# Patient Record
Sex: FEMALE | Race: WHITE | NOT HISPANIC OR LATINO | Employment: FULL TIME | URBAN - METROPOLITAN AREA
[De-identification: names, ages, dates, MRNs, and addresses within clinical notes are randomized per-mention and may not be internally consistent; named-entity substitution may affect disease eponyms.]

---

## 2017-01-03 ENCOUNTER — TELEPHONE (OUTPATIENT)
Dept: BARIATRICS | Facility: CLINIC | Age: 47
End: 2017-01-03

## 2017-01-03 NOTE — TELEPHONE ENCOUNTER
Received a call from Gina with [employer]. She states she tried calling last Friday and was unable to reach anyone and sent a fax stating this patient is denied as she has no benefits for bariatric surgery.  They have received the letters from multiple doctors and she states that the plan she chose through her employer doesn't have bariatric benefits. The medical director and she at [employer] have reviewed her plan and regardless of medical necessity which her doctors are proposing her plan has an exclusion for bariatric surgery.  The fax she sent includes instructions for an appeal which would have to be done in writing because this case has been brought to the medical director already.  She states [employer] doesn't have bariatric benefits depending of the plan chosen. She states that if she goes to her employer and they're stating that it does then she will have to find out from them how to turn the decision around.  I've had Gina send me the fax to my office which was done and I've given this info to [facility] so she can notify patient. Gina said the telephone number given, [phone] is her direct number and she can be called if we have any further questions.

## 2017-01-04 DIAGNOSIS — M54.31 BILATERAL SCIATICA: ICD-10-CM

## 2017-01-04 DIAGNOSIS — M54.32 BILATERAL SCIATICA: ICD-10-CM

## 2017-01-04 DIAGNOSIS — M54.17 LUMBOSACRAL RADICULOPATHY: ICD-10-CM

## 2017-01-04 RX ORDER — GABAPENTIN 300 MG/1
900 CAPSULE ORAL 3 TIMES DAILY
Qty: 270 CAPSULE | Refills: 2 | Status: SHIPPED | OUTPATIENT
Start: 2017-01-04 | End: 2017-02-03

## 2017-01-26 ENCOUNTER — OFFICE VISIT (OUTPATIENT)
Dept: BARIATRICS | Facility: CLINIC | Age: 47
End: 2017-01-26
Payer: COMMERCIAL

## 2017-01-26 VITALS
DIASTOLIC BLOOD PRESSURE: 78 MMHG | WEIGHT: 293 LBS | HEIGHT: 69 IN | BODY MASS INDEX: 43.4 KG/M2 | SYSTOLIC BLOOD PRESSURE: 140 MMHG | HEART RATE: 78 BPM

## 2017-01-26 DIAGNOSIS — M54.10 RADICULAR PAIN OF LEFT LOWER EXTREMITY: ICD-10-CM

## 2017-01-26 DIAGNOSIS — R73.03 PREDIABETES: Primary | ICD-10-CM

## 2017-01-26 DIAGNOSIS — M48.061 LUMBAR SPINAL STENOSIS: ICD-10-CM

## 2017-01-26 PROCEDURE — 3078F DIAST BP <80 MM HG: CPT | Mod: S$GLB,,, | Performed by: INTERNAL MEDICINE

## 2017-01-26 PROCEDURE — 99999 PR PBB SHADOW E&M-EST. PATIENT-LVL III: CPT | Mod: PBBFAC,,, | Performed by: INTERNAL MEDICINE

## 2017-01-26 PROCEDURE — 1159F MED LIST DOCD IN RCRD: CPT | Mod: S$GLB,,, | Performed by: INTERNAL MEDICINE

## 2017-01-26 PROCEDURE — 99213 OFFICE O/P EST LOW 20 MIN: CPT | Mod: S$GLB,,, | Performed by: INTERNAL MEDICINE

## 2017-01-26 PROCEDURE — 3077F SYST BP >= 140 MM HG: CPT | Mod: S$GLB,,, | Performed by: INTERNAL MEDICINE

## 2017-01-26 RX ORDER — METFORMIN HYDROCHLORIDE 1000 MG/1
1000 TABLET, FILM COATED, EXTENDED RELEASE ORAL
Qty: 90 TABLET | Refills: 1 | Status: SHIPPED | OUTPATIENT
Start: 2017-01-26 | End: 2017-02-07

## 2017-01-26 NOTE — PATIENT INSTRUCTIONS
3 meals a day made up of :  Unlimited green vegetables, tomatoes, mushrooms, spaghetti squash, cauliflower, meat, poultry, seafood, eggs and hard cheeses.   Avoid fried foods  Dressings, seasonings, condiments, etc should have less than 2 g sugars.    beans or nuts can have 1 x a day.   1-2 servings of citrus fruits, berries, pineapple or melon a day (1/2 cup)  Milk and plain yogurt    [email or web address]- moderate carb intake    Return if symptoms worsen or fail to improve.

## 2017-01-26 NOTE — MR AVS SNAPSHOT
[facility] - Bariatric Surgery  [address]  [facility] [address]  Phone: [phone]  Fax: [phone]                  Kalee Dupree   2017 2:00 PM   Office Visit    Description:  Female : 1970   Provider:  Debbie Trevino MD   Department:  [facility] - Bariatric Surgery           Reason for Visit     Follow-up           Diagnoses this Visit        Comments    Prediabetes    -  Primary            To Do List           Future Appointments        Provider Department Dept Phone    2/15/2017 4:00 PM Collin Tuttle MD [facility] [place] [facility] [phone]      Goals (5 Years of Data)     None      Follow-Up and Disposition     Return if symptoms worsen or fail to improve.       These Medications        Disp Refills Start End    metformin (GLUMETZA) 1000 MG (MOD) 24 hr tablet 90 tablet 1 2017    Take 1 tablet (1,000 mg total) by mouth daily with breakfast. - Oral    Pharmacy: [facility]/pharmacy #[id number] - [place] - [address]  #: [phone]         [facility]     [facility] -  Assistance  Registered nurses in the [facility] provide clinical advisement, health education, appointment booking, and other advisory services.  Call for this free service at [phone].             Medications           Message regarding Medications     Verify the changes and/or additions to your medication regime listed below are the same as discussed with your clinician today.  If any of these changes or additions are incorrect, please notify your healthcare provider.        START taking these NEW medications        Refills    metformin (GLUMETZA) 1000 MG (MOD) 24 hr tablet 1    Sig: Take 1 tablet (1,000 mg total) by mouth daily with breakfast.    Class: Normal    Route: Oral           Verify that the below list of medications is an accurate representation of the medications you are currently taking.  If none reported, the list may be blank. If  "incorrect, please contact your healthcare provider. Carry this list with you in case of emergency.           Current Medications     amitriptyline (ELAVIL) 10 MG tablet Take 1 tablet (10 mg total) by mouth every evening.    citalopram (CELEXA) 20 MG tablet Take 1 tablet (20 mg total) by mouth once daily.    fluticasone (FLONASE) 50 mcg/actuation nasal spray 1 spray by Each Nare route once daily.    gabapentin (NEURONTIN) 300 MG capsule Take 3 capsules (900 mg total) by mouth 3 (three) times daily.    hydrochlorothiazide (HYDRODIURIL) 25 MG tablet Take 1 tablet (25 mg total) by mouth once daily.    lisinopril (PRINIVIL,ZESTRIL) 40 MG tablet TAKE 1 TABLET BY MOUTH EVERY DAY    lisinopril (PRINIVIL,ZESTRIL) 40 MG tablet Take 1 tablet (40 mg total) by mouth once daily.    metformin (GLUMETZA) 1000 MG (MOD) 24 hr tablet Take 1 tablet (1,000 mg total) by mouth daily with breakfast.    metoprolol succinate (TOPROL-XL) 50 MG 24 hr tablet Take 1 tablet (50 mg total) by mouth once daily.    phentermine (ADIPEX-P) 37.5 mg tablet Take 1 tablet (37.5 mg total) by mouth before breakfast.    PROAIR HFA 90 mcg/actuation inhaler INHALE 1-2 PUFFS INTO THE LUNGS EVERY 6 (SIX) HOURS AS NEEDED FOR WHEEZING OR SHORTNESS OF BREATH.    tolterodine (DETROL LA) 4 MG 24 hr capsule TAKE 1 CAPSULE (4 MG TOTAL) BY MOUTH ONCE DAILY.    topiramate (TOPAMAX) 100 MG tablet Take 1 tablet (100 mg total) by mouth 2 (two) times daily.           Clinical Reference Information           Vital Signs - Last Recorded  Most recent update: 1/26/2017  2:17 PM by Taylor Elena MA    BP Pulse Ht Wt BMI    (!) 140/78 78 5' 9" (1.753 m) (!) 200.4 kg (441 lb 12.8 oz) 65.24 kg/m2      Blood Pressure          Most Recent Value    BP  (!)  140/78      Allergies as of 1/26/2017     Amlodipine    Azithromycin    Benazepril    Hydrocodone    Lotrel  [Amlodipine-benazepril]    Vicodin  [Hydrocodone-acetaminophen]      Immunizations Administered on Date of Encounter - " 1/26/2017     None      Instructions    3 meals a day made up of :  Unlimited green vegetables, tomatoes, mushrooms, spaghetti squash, cauliflower, meat, poultry, seafood, eggs and hard cheeses.   Avoid fried foods  Dressings, seasonings, condiments, etc should have less than 2 g sugars.    beans or nuts can have 1 x a day.   1-2 servings of citrus fruits, berries, pineapple or melon a day (1/2 cup)  Milk and plain yogurt    [email or web address]- moderate carb intake    Return if symptoms worsen or fail to improve.

## 2017-01-27 PROBLEM — M79.605 LUMBAR PAIN WITH RADIATION DOWN LEFT LEG: Status: ACTIVE | Noted: 2017-01-27

## 2017-01-27 PROBLEM — M54.50 LUMBAR PAIN WITH RADIATION DOWN LEFT LEG: Status: ACTIVE | Noted: 2017-01-27

## 2017-01-27 PROBLEM — M54.16 LUMBAR BACK PAIN WITH RADICULOPATHY AFFECTING RIGHT LOWER EXTREMITY: Status: ACTIVE | Noted: 2017-01-27

## 2017-01-27 NOTE — PROGRESS NOTES
Subjective:       Patient ID: Kalee Dupree is a 46 y.o. female.    Chief Complaint: Follow-up    HPI Comments: Pt is here today for follow-up of prediabetes, radicular pain associated with Lumbar spinal stenosis.  SHe has been seen by myself and neurology, and we agree that her problems were related to her weight. She did lose about 100 lbs in the past, but gained it back when dealing with some family stressors. She has lost about 10 lbs since I Last saw her. Does crave Carbs. Last A1c 6.4. Family h/o diabetes, including her mom who is in stage 4 CKD.  Has with her today documentation from [employer] that they will not cover gastric bypass.     Review of Systems   Constitutional: Negative for activity change, appetite change and unexpected weight change.   HENT: Negative for sore throat and voice change.    Eyes: Negative for pain and visual disturbance.   Respiratory: Negative for shortness of breath and wheezing.    Cardiovascular: Negative for palpitations and leg swelling.   Genitourinary: Positive for urgency. Negative for difficulty urinating and dysuria.   Musculoskeletal: Negative for arthralgias, back pain and myalgias.   Skin: Negative for pallor and rash.   Neurological: Positive for weakness and numbness. Negative for syncope, light-headedness and headaches.   Psychiatric/Behavioral: Positive for dysphoric mood. Negative for sleep disturbance. The patient is not nervous/anxious.        Objective:      Physical Exam    Assessment:       1. Prediabetes    2. Radicular pain of left lower extremity    3. Lumbar spinal stenosis        Plan:         1. Prediabetes  Start metforminn . Continue phentermine rxed by PCP.   - metformin (GLUMETZA) 1000 MG (MOD) 24 hr tablet; Take 1 tablet (1,000 mg total) by mouth daily with breakfast.  Dispense: 90 tablet; Refill: 1    2. Radicular pain of left lower extremity  Limiting her activity. Pain is better controled with gabapentin, topiramate and use of a cane.     3.  Lumbar spinal stenosis  See above. She will attempt for external review of insurance denial. If they still will not cover it, she will apply for a radha. Unfortunely, she has been facing a great deal of morbidity with this, and there is little any interventions will do at her current weight.     3 meals a day made up of :  Unlimited green vegetables, tomatoes, mushrooms, spaghetti squash, cauliflower, meat, poultry, seafood, eggs and hard cheeses.   Avoid fried foods  Dressings, seasonings, condiments, etc should have less than 2 g sugars.    beans or nuts can have 1 x a day.   1-2 servings of citrus fruits, berries, pineapple or melon a day (1/2 cup)  Milk and plain yogurt    [email or web address]- moderate carb intake    Return if symptoms worsen or fail to improve

## 2017-02-06 ENCOUNTER — TELEPHONE (OUTPATIENT)
Dept: BARIATRICS | Facility: CLINIC | Age: 47
End: 2017-02-06

## 2017-02-06 DIAGNOSIS — R73.03 PREDIABETES: Primary | ICD-10-CM

## 2017-02-06 NOTE — TELEPHONE ENCOUNTER
[facility] called stating the patient's insurance is not covering her [id number] 24HR. Pharmacy is requesting we changed it to Metformin 500 Extended release. The insurance will cover that one.

## 2017-02-07 ENCOUNTER — TELEPHONE (OUTPATIENT)
Dept: BARIATRICS | Facility: CLINIC | Age: 47
End: 2017-02-07

## 2017-02-07 RX ORDER — METFORMIN HYDROCHLORIDE EXTENDED-RELEASE TABLETS 500 MG/1
1000 TABLET, FILM COATED, EXTENDED RELEASE ORAL
Qty: 180 TABLET | Refills: 1 | Status: SHIPPED | OUTPATIENT
Start: 2017-02-07 | End: 2017-08-29 | Stop reason: SDUPTHER

## 2017-02-07 NOTE — TELEPHONE ENCOUNTER
----- Message from Adonay Lindsay sent at 2/7/2017 10:41 AM CST -----  Contact: [facility]  Pharmacy said the prescriptions needs to be changed to regular Metformin ER. Please call regarding this at [phone]

## 2017-02-15 ENCOUNTER — OFFICE VISIT (OUTPATIENT)
Dept: NEUROLOGY | Facility: CLINIC | Age: 47
End: 2017-02-15
Payer: COMMERCIAL

## 2017-02-15 VITALS
BODY MASS INDEX: 43.4 KG/M2 | SYSTOLIC BLOOD PRESSURE: 138 MMHG | WEIGHT: 293 LBS | HEIGHT: 69 IN | HEART RATE: 76 BPM | DIASTOLIC BLOOD PRESSURE: 82 MMHG

## 2017-02-15 DIAGNOSIS — R32 URINARY INCONTINENCE IN FEMALE: Primary | ICD-10-CM

## 2017-02-15 PROCEDURE — 3075F SYST BP GE 130 - 139MM HG: CPT | Mod: S$GLB,,, | Performed by: NEUROLOGICAL SURGERY

## 2017-02-15 PROCEDURE — 99214 OFFICE O/P EST MOD 30 MIN: CPT | Mod: S$GLB,,, | Performed by: NEUROLOGICAL SURGERY

## 2017-02-15 PROCEDURE — 3079F DIAST BP 80-89 MM HG: CPT | Mod: S$GLB,,, | Performed by: NEUROLOGICAL SURGERY

## 2017-02-15 PROCEDURE — 99999 PR PBB SHADOW E&M-EST. PATIENT-LVL III: CPT | Mod: PBBFAC,,, | Performed by: NEUROLOGICAL SURGERY

## 2017-02-15 RX ORDER — METFORMIN HYDROCHLORIDE 500 MG/1
TABLET, EXTENDED RELEASE ORAL
Refills: 1 | COMMUNITY
Start: 2017-02-06 | End: 2017-02-15 | Stop reason: SDUPTHER

## 2017-02-15 NOTE — PROGRESS NOTES
Chief Complaint   Patient presents with    3 Month Follow Up Lumbosacral Radiculopathy        Kalee Dupree is a 46 y.o. female with a history of multiple medical diagnoses as listed below that presents for evaluation of back pain. She has been having pain in the lower back with radiation into the buttocks, posterior thigh, and lateral leg for years but has only felt that it has been getting worse. She says that the pain is worse on the left side but has also been going on the right side as well. She has has MRI that showed that she has had degenerative changes in the lumbar spine and compression of the exiting nerve roots as well. She says that the pain has been make working difficult and she has been finding it harder to find ways to make it through her day. She has been evaluated in the past by spine surgery who recommended that she have decompression and fusion to treat her symtpoms however they required that she lose weight prior to being able to undertake the procedure. She has not been able to get a gastric sleeve due to coverage from her insurance. She has been having urinary incontinence when she changes position especially when she awakes in the morning. She has had weakness in the legs as well that has been making walking and standing increasingly difficult as well. She has not had any numbness in the medial thigh nor in the genitals.    Interval History  02/15/2017  She has been having persistent urinary incontinence since she was last seen. She says that despite her best efforts to hold her urin until she makes it to the bathroom she has been able to do so. She says that besides the embarrassment she has had several occasion where she was unable to hold her bladder on their carpet at home so that she has needed to replace it. She has been unable to walk very well and continues to have weakness in the legs causing her to fall on several occasions especially when she tries to get up quickly at night.  She has had persistent pain in the lower back. Opiate pain medications and NSAIDs have been unable to relieve her pains. She is now using a acne almost all the time due to her frequent falls. She has not had any incontinence of the bowels since she was last seen in clinic.    PAST MEDICAL HISTORY:  Past Medical History   Diagnosis Date    Focal segmental glomerulosclerosis     Hyperlipidemia     Hypertension     Morbid obesity     Proteinuria     Retinal artery occlusion        PAST SURGICAL HISTORY:  Past Surgical History   Procedure Laterality Date     section      Cholecystectomy       section         SOCIAL HISTORY:  Social History     Social History    Marital status:      Spouse name: N/A    Number of children: N/A    Years of education: N/A     Occupational History     [employer]     Social History Main Topics    Smoking status: Never Smoker    Smokeless tobacco: Not on file    Alcohol use No    Drug use: No    Sexual activity: Yes     Partners: Male     Other Topics Concern    Not on file     Social History Narrative    Several family members on mother side with aneurysms heart and aorta       FAMILY HISTORY:  Family History   Problem Relation Age of Onset    Cancer Mother 56     colon    Diabetes Mother     Hyperlipidemia Mother     Hypertension Mother     Obesity Mother     Kidney disease Mother     Cancer Father      lung       ALLERGIES AND MEDICATIONS: updated and reviewed.  Review of patient's allergies indicates:   Allergen Reactions    Amlodipine      Other reaction(s): Unknown    Azithromycin      Stomach pain, and cramping    Benazepril      Other reaction(s): Unknown    Hydrocodone      Other reaction(s): Unknown    Lotrel  [amlodipine-benazepril] Itching    Vicodin  [hydrocodone-acetaminophen] Hives and Itching     Current Outpatient Prescriptions   Medication Sig Dispense Refill    amitriptyline (ELAVIL) 10 MG tablet Take 1  tablet (10 mg total) by mouth every evening. 30 tablet 11    citalopram (CELEXA) 20 MG tablet Take 1 tablet (20 mg total) by mouth once daily. 90 tablet 1    hydrochlorothiazide (HYDRODIURIL) 25 MG tablet Take 1 tablet (25 mg total) by mouth once daily. 90 tablet 1    lisinopril (PRINIVIL,ZESTRIL) 40 MG tablet TAKE 1 TABLET BY MOUTH EVERY DAY 30 tablet 0    lisinopril (PRINIVIL,ZESTRIL) 40 MG tablet Take 1 tablet (40 mg total) by mouth once daily. 90 tablet 1    metformin (FORTAMET) 500 mg 24 hr tablet Take 2 tablets (1,000 mg total) by mouth daily with breakfast. 180 tablet 1    metoprolol succinate (TOPROL-XL) 50 MG 24 hr tablet Take 1 tablet (50 mg total) by mouth once daily. 90 tablet 3    phentermine (ADIPEX-P) 37.5 mg tablet Take 1 tablet (37.5 mg total) by mouth before breakfast. 30 tablet 2    PROAIR HFA 90 mcg/actuation inhaler INHALE 1-2 PUFFS INTO THE LUNGS EVERY 6 (SIX) HOURS AS NEEDED FOR WHEEZING OR SHORTNESS OF BREATH. 8.5 g 0    tolterodine (DETROL LA) 4 MG 24 hr capsule TAKE 1 CAPSULE (4 MG TOTAL) BY MOUTH ONCE DAILY. 30 capsule 2    topiramate (TOPAMAX) 100 MG tablet Take 1 tablet (100 mg total) by mouth 2 (two) times daily. 180 tablet 1    fluticasone (FLONASE) 50 mcg/actuation nasal spray 1 spray by Each Nare route once daily. 16 g 1    gabapentin (NEURONTIN) 300 MG capsule Take 3 capsules (900 mg total) by mouth 3 (three) times daily. 270 capsule 2     No current facility-administered medications for this visit.        Review of Systems   Constitutional: Negative for activity change, appetite change, fever and unexpected weight change.   HENT: Negative for trouble swallowing and voice change.    Eyes: Negative for photophobia and visual disturbance.   Respiratory: Negative for apnea and shortness of breath.    Cardiovascular: Negative for chest pain and leg swelling.   Gastrointestinal: Negative for constipation and nausea.   Genitourinary: Negative for difficulty urinating.    Musculoskeletal: Positive for gait problem. Negative for back pain and neck pain.   Skin: Negative for color change and pallor.   Neurological: Positive for weakness and numbness. Negative for dizziness, seizures and syncope.   Hematological: Negative for adenopathy.   Psychiatric/Behavioral: Positive for dysphoric mood. Negative for agitation, confusion and decreased concentration. The patient is nervous/anxious.        Neurologic Exam     Mental Status   Oriented to person, place, and time.   Registration: recalls 3 of 3 objects.   Attention: normal. Concentration: normal.   Speech: speech is normal   Level of consciousness: alert  Knowledge: good.     Cranial Nerves     CN II   Visual fields full to confrontation.   Right visual field deficit: none  Left visual field deficit: none     CN III, IV, VI   Pupils are equal, round, and reactive to light.  Extraocular motions are normal.   Right pupil: Size: 3 mm. Shape: regular. Accommodation: intact.   Left pupil: Size: 3 mm. Shape: regular. Accommodation: intact.   CN III: no CN III palsy  CN VI: no CN VI palsy  Nystagmus: none   Diplopia: none  Ophthalmoparesis: none  Upgaze: normal  Downgaze: normal  Conjugate gaze: present    CN V   Facial sensation intact.   Right facial sensation deficit: none  Left facial sensation deficit: none    CN VII   Facial expression full, symmetric.   Right facial weakness: none  Left facial weakness: none    CN VIII   CN VIII normal.     CN IX, X   CN IX normal.   CN X normal.   Palate: symmetric    CN XI   CN XI normal.   Right sternocleidomastoid strength: normal  Left sternocleidomastoid strength: normal  Right trapezius strength: normal  Left trapezius strength: normal    CN XII   CN XII normal.   Tongue deviation: none    Motor Exam   Muscle bulk: normal  Overall muscle tone: normal  Right arm tone: normal  Left arm tone: normal  Right leg tone: normal  Left leg tone: normal    Strength   Strength 5/5 throughout.     Sensory  Exam   Right arm light touch: normal  Left arm light touch: normal  Right leg light touch: decreased from knee  Left leg light touch: decreased from knee  Right arm vibration: normal  Left arm vibration: normal  Right leg vibration: decreased from knee  Left leg vibration: decreased from knee  Right arm proprioception: normal  Left arm proprioception: normal  Right leg proprioception: decreased from knee  Left leg proprioception: decreased from knee  Right arm pinprick: normal  Left arm pinprick: normal  Right leg pinprick: decreased from knee  Left leg pinprick: decreased from knee  Sensory deficit distribution on right: sciatic  Sensory deficit distribution on left: sciatic    Gait, Coordination, and Reflexes     Gait  Gait: normal    Coordination   Romberg: negative  Finger to nose coordination: normal  Heel to shin coordination: normal  Tandem walking coordination: normal    Tremor   Resting tremor: absent    Reflexes   Right brachioradialis: 2+  Left brachioradialis: 2+  Right biceps: 2+  Left biceps: 2+  Right triceps: 2+  Left triceps: 2+  Right patellar: 1+  Left patellar: 1+  Right achilles: 1+  Left achilles: 1+  Right plantar: normal  Left plantar: normal      Physical Exam   Constitutional: She is oriented to person, place, and time.   Eyes: EOM are normal. Pupils are equal, round, and reactive to light.   Neurological: She is oriented to person, place, and time. She has normal strength. She has a normal Finger-Nose-Finger Test, a normal Heel to Shin Test, a normal Romberg Test and a normal Tandem Gait Test. Gait normal.   Reflex Scores:       Tricep reflexes are 2+ on the right side and 2+ on the left side.       Bicep reflexes are 2+ on the right side and 2+ on the left side.       Brachioradialis reflexes are 2+ on the right side and 2+ on the left side.       Patellar reflexes are 1+ on the right side and 1+ on the left side.       Achilles reflexes are 1+ on the right side and 1+ on the left  "side.  Psychiatric: Her speech is normal.       Vitals:    02/15/17 1611   BP: 138/82   BP Location: Left arm   Patient Position: Sitting   BP Method: Manual   Pulse: 76   Weight: (!) 200 kg (440 lb 14.7 oz)   Height: 5' 9" (1.753 m)     MRI lumbar spine personally reviewed: degenerative changes in L4-S1 discs with compression of the neuroforaminal spaces and compression of epidural space    Assessment & Plan:  Urinary incontinence in female  -     Ambulatory Referral to Urology         Patient's symptoms will likely continue get worse with time unless she has surgery to correct the lumbar spine problems  Gabapentin to help with neuropathic pains  Elavil to help with bladder, sleep, and pain issues  Referral to urology due to persistent incontinence    Follow-up: Return in about 3 months (around 5/15/2017).  More than 50% of this 25 minute encounter was spent in counseling and coordinating care.      "

## 2017-03-20 ENCOUNTER — PATIENT MESSAGE (OUTPATIENT)
Dept: NEUROLOGY | Facility: CLINIC | Age: 47
End: 2017-03-20

## 2017-04-18 ENCOUNTER — OFFICE VISIT (OUTPATIENT)
Dept: UROLOGY | Facility: CLINIC | Age: 47
End: 2017-04-18
Payer: COMMERCIAL

## 2017-04-18 VITALS
DIASTOLIC BLOOD PRESSURE: 92 MMHG | HEIGHT: 69 IN | BODY MASS INDEX: 43.4 KG/M2 | SYSTOLIC BLOOD PRESSURE: 143 MMHG | WEIGHT: 293 LBS | HEART RATE: 92 BPM

## 2017-04-18 DIAGNOSIS — N39.46 MIXED STRESS AND URGE URINARY INCONTINENCE: Primary | ICD-10-CM

## 2017-04-18 DIAGNOSIS — I10 ESSENTIAL HYPERTENSION: ICD-10-CM

## 2017-04-18 DIAGNOSIS — R73.03 PREDIABETES: ICD-10-CM

## 2017-04-18 DIAGNOSIS — E66.01 MORBID OBESITY, UNSPECIFIED OBESITY TYPE: ICD-10-CM

## 2017-04-18 LAB
BILIRUB SERPL-MCNC: NORMAL MG/DL
BLOOD URINE, POC: 250
COLOR, POC UA: NORMAL
GLUCOSE UR QL STRIP: NORMAL
KETONES UR QL STRIP: NORMAL
LEUKOCYTE ESTERASE URINE, POC: NORMAL
NITRITE, POC UA: NORMAL
PH, POC UA: 5
PROTEIN, POC: 2
SPECIFIC GRAVITY, POC UA: 1.01
UROBILINOGEN, POC UA: NORMAL

## 2017-04-18 PROCEDURE — 51701 INSERT BLADDER CATHETER: CPT | Mod: S$GLB,,, | Performed by: UROLOGY

## 2017-04-18 PROCEDURE — 87086 URINE CULTURE/COLONY COUNT: CPT

## 2017-04-18 PROCEDURE — 99244 OFF/OP CNSLTJ NEW/EST MOD 40: CPT | Mod: 25,S$GLB,, | Performed by: UROLOGY

## 2017-04-18 PROCEDURE — 99999 PR PBB SHADOW E&M-EST. PATIENT-LVL III: CPT | Mod: PBBFAC,,, | Performed by: UROLOGY

## 2017-04-18 PROCEDURE — 81001 URINALYSIS AUTO W/SCOPE: CPT | Mod: S$GLB,,, | Performed by: UROLOGY

## 2017-04-18 RX ORDER — GABAPENTIN 300 MG/1
CAPSULE ORAL
Refills: 5 | COMMUNITY
Start: 2017-03-18 | End: 2018-03-16 | Stop reason: SDUPTHER

## 2017-04-18 NOTE — PROGRESS NOTES
"Subjective:       Patient ID: Kalee Dupree is a 46 y.o. female.    Chief Complaint: Urinary Incontinence (recommended by  (Carlos Dupree) who saw Dr. Greene and Dr. Tuttle, neurologist . Mixed incontinence since summer 2016.  Wears 3 to 4 pads per day. )    HPI Comments: Kalee Dupree is a 46 y.o. Female referred from Collin Tuttle MD for mixed incontinence.   She has been on Detrol LA since 10/16. It isn't doing anything.   She has arthritis in the back and 2 herniated disk.    Feels like it is like a "waterfall". She is wearing a pad and impress. That isn't working well.  Has urinary urgency and urge incontinence.   Most incontinence is positional and worse with coughing, sneezing and laughing.   Has tried to get gastric bypass and it has been denied. Also denied disc surgery for "exclusion".   No gross hematuria.   No recurrent UTI.   Having enuresis.     Some constipation. Not doing anything for it.     Complaining of some lower back pain on right, positional.      Has sleep apnea. Nocturia 1-2 times. Wears cpap.   Lower extremity swelling at times.           Past Surgical History:   Procedure Laterality Date     SECTION       SECTION      CHOLECYSTECTOMY         Past Medical History:   Diagnosis Date    Focal segmental glomerulosclerosis     Hyperlipidemia     Hypertension     Morbid obesity     Proteinuria     Retinal artery occlusion        Social History     Social History    Marital status:      Spouse name: N/A    Number of children: N/A    Years of education: N/A     Occupational History     [employer]     Social History Main Topics    Smoking status: Never Smoker    Smokeless tobacco: Not on file    Alcohol use No    Drug use: No    Sexual activity: Yes     Partners: Male     Other Topics Concern    Not on file     Social History Narrative    Several family members on mother side with aneurysms heart and aorta       Family " History   Problem Relation Age of Onset    Cancer Mother 56     colon    Diabetes Mother     Hyperlipidemia Mother     Hypertension Mother     Obesity Mother     Kidney disease Mother     Cancer Father      lung       Current Outpatient Prescriptions   Medication Sig Dispense Refill    amitriptyline (ELAVIL) 10 MG tablet Take 1 tablet (10 mg total) by mouth every evening. 30 tablet 11    citalopram (CELEXA) 20 MG tablet Take 1 tablet (20 mg total) by mouth once daily. 90 tablet 1    fluticasone (FLONASE) 50 mcg/actuation nasal spray 1 spray by Each Nare route once daily. 16 g 1    gabapentin (NEURONTIN) 300 MG capsule TAKE 3 CAPSULES (900 MG TOTAL) BY MOUTH 3 (THREE) TIMES DAILY.  5    hydrochlorothiazide (HYDRODIURIL) 25 MG tablet Take 1 tablet (25 mg total) by mouth once daily. 90 tablet 1    lisinopril (PRINIVIL,ZESTRIL) 40 MG tablet TAKE 1 TABLET BY MOUTH EVERY DAY 30 tablet 0    lisinopril (PRINIVIL,ZESTRIL) 40 MG tablet Take 1 tablet (40 mg total) by mouth once daily. 90 tablet 1    metformin (FORTAMET) 500 mg 24 hr tablet Take 2 tablets (1,000 mg total) by mouth daily with breakfast. 180 tablet 1    metoprolol succinate (TOPROL-XL) 50 MG 24 hr tablet Take 1 tablet (50 mg total) by mouth once daily. 90 tablet 3    phentermine (ADIPEX-P) 37.5 mg tablet Take 1 tablet (37.5 mg total) by mouth before breakfast. 30 tablet 2    PROAIR HFA 90 mcg/actuation inhaler INHALE 1-2 PUFFS INTO THE LUNGS EVERY 6 (SIX) HOURS AS NEEDED FOR WHEEZING OR SHORTNESS OF BREATH. 8.5 g 0    tolterodine (DETROL LA) 4 MG 24 hr capsule TAKE 1 CAPSULE (4 MG TOTAL) BY MOUTH ONCE DAILY. 30 capsule 2     No current facility-administered medications for this visit.        Review of patient's allergies indicates:   Allergen Reactions    Amlodipine      Other reaction(s): Unknown    Azithromycin      Stomach pain, and cramping    Benazepril      Other reaction(s): Unknown    Hydrocodone      Other reaction(s): Unknown     Lotrel  [amlodipine-benazepril] Itching    Vicodin  [hydrocodone-acetaminophen] Hives and Itching       Review of Systems   Constitutional: Negative for chills, fever and unexpected weight change.   HENT: Negative for nosebleeds.    Eyes: Negative for visual disturbance.   Respiratory: Negative for chest tightness.    Cardiovascular: Negative for chest pain.   Gastrointestinal: Positive for constipation and nausea. Negative for diarrhea.        Nausea at times.   Genitourinary: Positive for enuresis and nocturia. Negative for dysuria.   Musculoskeletal: Positive for back pain and gait problem. Negative for myalgias.        Using a cane.   Skin: Negative for rash.   Neurological: Negative for seizures.   Hematological: Does not bruise/bleed easily.   Psychiatric/Behavioral: Negative for behavioral problems.       Objective:      Physical Exam   Vitals reviewed.  Constitutional: She is oriented to person, place, and time. She appears well-developed and well-nourished. No distress.   HENT:   Head: Normocephalic and atraumatic.   Eyes: No scleral icterus.   Cardiovascular: Normal rate and regular rhythm.    Pulmonary/Chest: Effort normal. No respiratory distress.   Abdominal: She exhibits no mass.   Morbidly obese. Redundant panus. Lap scars.    Genitourinary: There is no rash on the right labia. There is no rash on the left labia.   Genitourinary Comments: The external genitalia were normal. No rash. Atrophic vaginitis was not present. The urethral showed no evidence of a urethral diverticulum.  And in and out cath was performed under sterile conditions immediately after voiding. The PVR was 30 ml.    Perineum normal. External hemorrhoids were not present. Levator was not tender to palpation.   The uterus was present. A cystocele was not present. A rectocele was was not present.    Musculoskeletal: She exhibits no tenderness.   Lymphadenopathy:     She has no cervical adenopathy.   Neurological: She is alert and  oriented to person, place, and time.   Skin: Skin is warm and dry. No rash noted.     Psychiatric: She has a normal mood and affect.     urine dip nitrite positive, 2 +Protein  Assessment:       1. Mixed stress and urge urinary incontinence    2. Morbid obesity, unspecified obesity type    3. Prediabetes    4. Essential hypertension      5. UTI  Plan:     Schedule for SUDS to evaluate mixed incontinence which is likely multi-factorial. Morbid obesity likely contributing to BIRD. Will refer to PT.   Obesity discussed. Insurance will not pay for gastric bypass.   She needs back surgery, insurance will not pay for this either to date.   Urine culture to r/o infection.     I would like to thank Collin Tuttle MD for referral of this patient.

## 2017-04-18 NOTE — LETTER
April 18, 2017      Collin Tuttle MD  [address]  [address]  [facility] [address]           [facility] - [facility]rology 4th Floor  [address]  [place] [address]  Phone: [phone]          Patient: Kalee Dupree   MR Number: [id number]   YOB: 1970   Date of Visit: 4/18/2017       Dear Dr. Collin Tuttle:    Thank you for referring Kalee Dupree to me for evaluation. Attached you will find relevant portions of my assessment and plan of care.    If you have questions, please do not hesitate to call me. I look forward to following Kalee Dupree along with you.    Sincerely,    Ping Greene MD    Enclosure  CC:  No Recipients    If you would like to receive this communication electronically, please contact [email or web address] or [phone] to request more information on [employer] Link access.    For providers and/or their staff who would like to refer a patient to [facility], please contact us through our one-stop-shop provider referral line, [facility], at [phone].    If you feel you have received this communication in error or would no longer like to receive these types of communications, please e-mail [email or web address]

## 2017-04-20 ENCOUNTER — PATIENT MESSAGE (OUTPATIENT)
Dept: UROLOGY | Facility: CLINIC | Age: 47
End: 2017-04-20

## 2017-04-20 LAB
BACTERIA UR CULT: NORMAL
BACTERIA UR CULT: NORMAL

## 2017-05-01 ENCOUNTER — OFFICE VISIT (OUTPATIENT)
Dept: FAMILY MEDICINE | Facility: CLINIC | Age: 47
End: 2017-05-01
Payer: COMMERCIAL

## 2017-05-01 ENCOUNTER — LAB VISIT (OUTPATIENT)
Dept: LAB | Facility: HOSPITAL | Age: 47
End: 2017-05-01
Attending: NURSE PRACTITIONER
Payer: COMMERCIAL

## 2017-05-01 VITALS
HEART RATE: 106 BPM | OXYGEN SATURATION: 96 % | TEMPERATURE: 99 F | SYSTOLIC BLOOD PRESSURE: 138 MMHG | RESPIRATION RATE: 18 BRPM | DIASTOLIC BLOOD PRESSURE: 84 MMHG | HEIGHT: 69 IN | BODY MASS INDEX: 43.4 KG/M2 | WEIGHT: 293 LBS

## 2017-05-01 DIAGNOSIS — R19.7 DIARRHEA, UNSPECIFIED TYPE: ICD-10-CM

## 2017-05-01 DIAGNOSIS — J03.90 TONSILLITIS WITH EXUDATE: Primary | ICD-10-CM

## 2017-05-01 DIAGNOSIS — E66.01 MORBID OBESITY WITH BMI OF 60.0-69.9, ADULT: ICD-10-CM

## 2017-05-01 DIAGNOSIS — J02.9 SORE THROAT: ICD-10-CM

## 2017-05-01 DIAGNOSIS — J02.9 PHARYNGOTONSILLITIS: ICD-10-CM

## 2017-05-01 DIAGNOSIS — R73.03 PREDIABETES: ICD-10-CM

## 2017-05-01 DIAGNOSIS — J03.90 PHARYNGOTONSILLITIS: ICD-10-CM

## 2017-05-01 LAB
ALBUMIN SERPL BCP-MCNC: 3.4 G/DL
ALP SERPL-CCNC: 77 U/L
ALT SERPL W/O P-5'-P-CCNC: 30 U/L
ANION GAP SERPL CALC-SCNC: 10 MMOL/L
AST SERPL-CCNC: 23 U/L
BASOPHILS # BLD AUTO: 0.03 K/UL
BASOPHILS NFR BLD: 0.3 %
BILIRUB SERPL-MCNC: 0.5 MG/DL
BUN SERPL-MCNC: 19 MG/DL
CALCIUM SERPL-MCNC: 10.2 MG/DL
CHLORIDE SERPL-SCNC: 103 MMOL/L
CO2 SERPL-SCNC: 28 MMOL/L
CREAT SERPL-MCNC: 1.3 MG/DL
CTP QC/QA: YES
DIFFERENTIAL METHOD: ABNORMAL
EOSINOPHIL # BLD AUTO: 0.2 K/UL
EOSINOPHIL NFR BLD: 1.7 %
ERYTHROCYTE [DISTWIDTH] IN BLOOD BY AUTOMATED COUNT: 14.8 %
EST. GFR  (AFRICAN AMERICAN): 56.9 ML/MIN/1.73 M^2
EST. GFR  (NON AFRICAN AMERICAN): 49.3 ML/MIN/1.73 M^2
GLUCOSE SERPL-MCNC: 255 MG/DL
HCT VFR BLD AUTO: 39 %
HGB BLD-MCNC: 12.6 G/DL
LYMPHOCYTES # BLD AUTO: 2.1 K/UL
LYMPHOCYTES NFR BLD: 20.2 %
MCH RBC QN AUTO: 29.7 PG
MCHC RBC AUTO-ENTMCNC: 32.3 %
MCV RBC AUTO: 92 FL
MONOCYTES # BLD AUTO: 1 K/UL
MONOCYTES NFR BLD: 9.7 %
NEUTROPHILS # BLD AUTO: 7 K/UL
NEUTROPHILS NFR BLD: 67.3 %
PLATELET # BLD AUTO: 202 K/UL
PMV BLD AUTO: 10.3 FL
POTASSIUM SERPL-SCNC: 4 MMOL/L
PROT SERPL-MCNC: 7.8 G/DL
RBC # BLD AUTO: 4.24 M/UL
S PYO RRNA THROAT QL PROBE: NEGATIVE
SODIUM SERPL-SCNC: 141 MMOL/L
WBC # BLD AUTO: 10.37 K/UL

## 2017-05-01 PROCEDURE — 83036 HEMOGLOBIN GLYCOSYLATED A1C: CPT

## 2017-05-01 PROCEDURE — 87880 STREP A ASSAY W/OPTIC: CPT | Mod: QW,S$GLB,, | Performed by: NURSE PRACTITIONER

## 2017-05-01 PROCEDURE — 36415 COLL VENOUS BLD VENIPUNCTURE: CPT | Mod: PO

## 2017-05-01 PROCEDURE — 99214 OFFICE O/P EST MOD 30 MIN: CPT | Mod: S$GLB,,, | Performed by: NURSE PRACTITIONER

## 2017-05-01 PROCEDURE — 3075F SYST BP GE 130 - 139MM HG: CPT | Mod: S$GLB,,, | Performed by: NURSE PRACTITIONER

## 2017-05-01 PROCEDURE — 99999 PR PBB SHADOW E&M-EST. PATIENT-LVL V: CPT | Mod: PBBFAC,,, | Performed by: NURSE PRACTITIONER

## 2017-05-01 PROCEDURE — 80053 COMPREHEN METABOLIC PANEL: CPT

## 2017-05-01 PROCEDURE — 87147 CULTURE TYPE IMMUNOLOGIC: CPT

## 2017-05-01 PROCEDURE — 85025 COMPLETE CBC W/AUTO DIFF WBC: CPT

## 2017-05-01 PROCEDURE — 3079F DIAST BP 80-89 MM HG: CPT | Mod: S$GLB,,, | Performed by: NURSE PRACTITIONER

## 2017-05-01 PROCEDURE — 87070 CULTURE OTHR SPECIMN AEROBIC: CPT

## 2017-05-01 PROCEDURE — 1160F RVW MEDS BY RX/DR IN RCRD: CPT | Mod: S$GLB,,, | Performed by: NURSE PRACTITIONER

## 2017-05-01 RX ORDER — METHYLPREDNISOLONE 4 MG/1
TABLET ORAL
Qty: 1 PACKAGE | Refills: 0 | Status: SHIPPED | OUTPATIENT
Start: 2017-05-01 | End: 2017-05-22

## 2017-05-01 RX ORDER — LOPERAMIDE HYDROCHLORIDE 2 MG/1
2 CAPSULE ORAL 4 TIMES DAILY PRN
Qty: 30 CAPSULE | Refills: 0 | Status: SHIPPED | OUTPATIENT
Start: 2017-05-01 | End: 2017-05-11

## 2017-05-01 RX ORDER — AMOXICILLIN AND CLAVULANATE POTASSIUM 875; 125 MG/1; MG/1
1 TABLET, FILM COATED ORAL EVERY 12 HOURS
Qty: 20 TABLET | Refills: 0 | Status: SHIPPED | OUTPATIENT
Start: 2017-05-01 | End: 2017-05-11

## 2017-05-01 RX ORDER — GUAIFENESIN 600 MG/1
1200 TABLET, EXTENDED RELEASE ORAL 2 TIMES DAILY
Qty: 120 TABLET | Refills: 0 | COMMUNITY
Start: 2017-05-01 | End: 2017-05-31

## 2017-05-01 NOTE — LETTER
May 1, 2017      Kalee Dupree   [address] [place] [address] [address] [id number]             [facility]  [address] [facility]  [place] [address]  Phone: [phone]  Fax: [phone] Kalee Dupree    Was treated here on 05/01/2017    May Return to work/school on 05/03/2017    No Restrictions            Singh Crocker NP

## 2017-05-01 NOTE — PROGRESS NOTES
Subjective:       Patient ID: Kalee Dupree is a 46 y.o. female.    Chief Complaint: Sinus Problem (x's 4 days); Sore Throat (x's 4 days); Diarrhea (x's 4 days); and Fever (x's 4 days)    Sinus Problem   This is a new problem. The current episode started in the past 7 days. The problem has been gradually worsening since onset. There has been no fever. Her pain is at a severity of 4/10. The pain is moderate. Associated symptoms include congestion, ear pain, headaches, a hoarse voice, sinus pressure, a sore throat and swollen glands. Pertinent negatives include no chills, coughing, diaphoresis, neck pain, shortness of breath or sneezing.   Sore Throat    This is a new problem. The current episode started in the past 7 days. The problem has been gradually worsening. The pain is worse on the left side. The maximum temperature recorded prior to her arrival was 100.4 - 100.9 F (at night). The fever has been present for 1 to 2 days. The pain is at a severity of 4/10. The pain is moderate. Associated symptoms include abdominal pain, congestion, diarrhea, ear pain, headaches, a hoarse voice, swollen glands and trouble swallowing (hurts to swallow). Pertinent negatives include no coughing, drooling, ear discharge, plugged ear sensation, neck pain, shortness of breath or vomiting. She has had no exposure to strep or mono. She has tried NSAIDs for the symptoms.   Diarrhea    This is a new problem. The current episode started in the past 7 days (friday morning ). The problem occurs 2 to 4 times per day. The problem has been unchanged. The stool consistency is described as watery. The patient states that diarrhea does not awaken her from sleep. Associated symptoms include abdominal pain, a fever, headaches and a URI. Pertinent negatives include no arthralgias, bloating, chills, coughing, increased  flatus, myalgias, sweats or vomiting. Nothing aggravates the symptoms. There are no known risk factors. She has tried nothing for  the symptoms.     Review of Systems   Constitutional: Positive for fever. Negative for activity change, appetite change, chills, diaphoresis and fatigue.   HENT: Positive for congestion, ear pain, hoarse voice, postnasal drip, rhinorrhea, sinus pressure, sore throat and trouble swallowing (hurts to swallow). Negative for drooling, ear discharge, sneezing and voice change.    Respiratory: Negative for cough, shortness of breath and wheezing.    Cardiovascular: Negative for chest pain.   Gastrointestinal: Positive for abdominal pain and diarrhea. Negative for bloating, flatus, nausea and vomiting.   Musculoskeletal: Negative for arthralgias, myalgias and neck pain.   Neurological: Positive for headaches.       Objective:      Physical Exam   Constitutional: She is oriented to person, place, and time. She appears well-developed and well-nourished. No distress.   HENT:   Head: Normocephalic and atraumatic.   Right Ear: Tympanic membrane, external ear and ear canal normal.   Left Ear: Tympanic membrane, external ear and ear canal normal.   Nose: Mucosal edema and rhinorrhea present. Right sinus exhibits no maxillary sinus tenderness and no frontal sinus tenderness. Left sinus exhibits no maxillary sinus tenderness and no frontal sinus tenderness.   Mouth/Throat: Uvula is midline and mucous membranes are normal. Mucous membranes are not dry. Oropharyngeal exudate, posterior oropharyngeal edema and posterior oropharyngeal erythema present. Tonsils are 3+ on the right. Tonsils are 3+ on the left. Tonsillar exudate.   Cardiovascular: Normal rate and regular rhythm.    No murmur heard.  Pulmonary/Chest: Effort normal and breath sounds normal. She has no wheezes. She has no rales.   Lymphadenopathy:     She has no cervical adenopathy.   Neurological: She is alert and oriented to person, place, and time.   Skin: Skin is warm and dry.   Psychiatric: She has a normal mood and affect.   Nursing note and vitals reviewed.       Assessment:       1. Tonsillitis with exudate    2. Pharyngotonsillitis    3. Sore throat    4. Diarrhea, unspecified type    5. Morbid obesity with BMI of 60.0-69.9, adult    6. Prediabetes        Plan:         Kalee was seen today for sinus problem, sore throat, diarrhea and fever.    Diagnoses and all orders for this visit:    Tonsillitis with exudate  -     amoxicillin-clavulanate 875-125mg (AUGMENTIN) 875-125 mg per tablet; Take 1 tablet by mouth every 12 (twelve) hours.  -     guaifenesin (MUCINEX) 600 mg 12 hr tablet; Take 2 tablets (1,200 mg total) by mouth 2 (two) times daily.  -     methylPREDNISolone (MEDROL DOSEPACK) 4 mg tablet; use as directed    Pharyngotonsillitis  -     amoxicillin-clavulanate 875-125mg (AUGMENTIN) 875-125 mg per tablet; Take 1 tablet by mouth every 12 (twelve) hours.  -     guaifenesin (MUCINEX) 600 mg 12 hr tablet; Take 2 tablets (1,200 mg total) by mouth 2 (two) times daily.  -     methylPREDNISolone (MEDROL DOSEPACK) 4 mg tablet; use as directed  -     Throat culture    Sore throat  -     POCT Rapid Strep A  -     amoxicillin-clavulanate 875-125mg (AUGMENTIN) 875-125 mg per tablet; Take 1 tablet by mouth every 12 (twelve) hours.  -     guaifenesin (MUCINEX) 600 mg 12 hr tablet; Take 2 tablets (1,200 mg total) by mouth 2 (two) times daily.  -     methylPREDNISolone (MEDROL DOSEPACK) 4 mg tablet; use as directed  -     Throat culture    Diarrhea, unspecified type  -     loperamide (IMODIUM) 2 mg capsule; Take 1 capsule (2 mg total) by mouth 4 (four) times daily as needed for Diarrhea.    Morbid obesity with BMI of 60.0-69.9, adult  -     CBC auto differential; Future  -     Comprehensive metabolic panel; Future  -     Hemoglobin A1c; Future    Prediabetes  -     CBC auto differential; Future  -     Comprehensive metabolic panel; Future  -     Hemoglobin A1c; Future    Advised OTC meds for symptom relief: xyzal (home med), benadryl, sudafed, flonase (home med), tylenol.   Rest  and drink plenty of fluids   F/u in 1 wk if symptoms persist or worsen   - Cautioned on s/s's to RTC.   - Gave reference handout on acute sinusitis

## 2017-05-01 NOTE — MR AVS SNAPSHOT
[facility]  [address]  [place] [address]  Phone: [phone]  Fax: [phone]                  Kalee Dupree   2017 9:00 AM   Office Visit    Description:  Female : 1970   Provider:  Singh Crocker NP   Department:  [facility]           Reason for Visit     Sinus Problem     Sore Throat     Diarrhea     Fever           Diagnoses this Visit        Comments    Sore throat    -  Primary     Pharyngotonsillitis         Tonsillitis with exudate         Diarrhea, unspecified type         Morbid obesity with BMI of 60.0-69.9, adult         Prediabetes                To Do List           Future Appointments        Provider Department Dept Phone    2017 5:00 PM Fay Montoya, PT [facility] [phone]    2017 8:00 AM ANABELLE, UROLOGY [facility] 4th Floor [phone]    2017 2:00 PM Colette Pool MD [facility] [phone]      Goals (5 Years of Data)     None      Follow-Up and Disposition     Return if symptoms worsen or fail to improve.       These Medications        Disp Refills Start End    amoxicillin-clavulanate 875-125mg (AUGMENTIN) 875-125 mg per tablet 20 tablet 0 2017    Take 1 tablet by mouth every 12 (twelve) hours. - Oral    Pharmacy: [facility]/pharmacy #[id number] - [place] - [address] Ph #: [phone]       methylPREDNISolone (MEDROL DOSEPACK) 4 mg tablet 1 Package 0 2017    use as directed    Pharmacy: [facility]/pharmacy #[id number] - [place] - [address] Ph #: [phone]       loperamide (IMODIUM) 2 mg capsule 30 capsule 0 2017    Take 1 capsule (2 mg total) by mouth 4 (four) times daily as needed for Diarrhea. - Oral    Pharmacy: [facility]/pharmacy #[id number] - [place] - [address] Ph #: [phone]         PURCHASE these Medications (No prescription required)        Start End    guaifenesin (MUCINEX) 600 mg 12 hr tablet 2017  5/31/2017    Sig - Route: Take 2 tablets (1,200 mg total) by mouth 2 (two) times daily. - Oral    Class: OTC      [facility]     [facility] Care Line - 24/7 Assistance  Unless otherwise directed by your provider, please contact [facility], our nurse care line that is available for 24/7 assistance.     Registered nurses in the [facility] provide: appointment scheduling, clinical advisement, health education, and other advisory services.  Call: [phone] (toll free)               Medications           Message regarding Medications     Verify the changes and/or additions to your medication regime listed below are the same as discussed with your clinician today.  If any of these changes or additions are incorrect, please notify your healthcare provider.        START taking these NEW medications        Refills    amoxicillin-clavulanate 875-125mg (AUGMENTIN) 875-125 mg per tablet 0    Sig: Take 1 tablet by mouth every 12 (twelve) hours.    Class: Normal    Route: Oral    guaifenesin (MUCINEX) 600 mg 12 hr tablet 0    Sig: Take 2 tablets (1,200 mg total) by mouth 2 (two) times daily.    Class: OTC    Route: Oral    methylPREDNISolone (MEDROL DOSEPACK) 4 mg tablet 0    Sig: use as directed    Class: Normal    loperamide (IMODIUM) 2 mg capsule 0    Sig: Take 1 capsule (2 mg total) by mouth 4 (four) times daily as needed for Diarrhea.    Class: Normal    Route: Oral           Verify that the below list of medications is an accurate representation of the medications you are currently taking.  If none reported, the list may be blank. If incorrect, please contact your healthcare provider. Carry this list with you in case of emergency.           Current Medications     amitriptyline (ELAVIL) 10 MG tablet Take 1 tablet (10 mg total) by mouth every evening.    citalopram (CELEXA) 20 MG tablet Take 1 tablet (20 mg total) by mouth once daily.    gabapentin (NEURONTIN) 300 MG capsule TAKE 3  "CAPSULES (900 MG TOTAL) BY MOUTH 3 (THREE) TIMES DAILY.    hydrochlorothiazide (HYDRODIURIL) 25 MG tablet Take 1 tablet (25 mg total) by mouth once daily.    lisinopril (PRINIVIL,ZESTRIL) 40 MG tablet TAKE 1 TABLET BY MOUTH EVERY DAY    lisinopril (PRINIVIL,ZESTRIL) 40 MG tablet Take 1 tablet (40 mg total) by mouth once daily.    metformin (FORTAMET) 500 mg 24 hr tablet Take 2 tablets (1,000 mg total) by mouth daily with breakfast.    metoprolol succinate (TOPROL-XL) 50 MG 24 hr tablet Take 1 tablet (50 mg total) by mouth once daily.    PROAIR HFA 90 mcg/actuation inhaler INHALE 1-2 PUFFS INTO THE LUNGS EVERY 6 (SIX) HOURS AS NEEDED FOR WHEEZING OR SHORTNESS OF BREATH.    tolterodine (DETROL LA) 4 MG 24 hr capsule TAKE 1 CAPSULE (4 MG TOTAL) BY MOUTH ONCE DAILY.    amoxicillin-clavulanate 875-125mg (AUGMENTIN) 875-125 mg per tablet Take 1 tablet by mouth every 12 (twelve) hours.    fluticasone (FLONASE) 50 mcg/actuation nasal spray 1 spray by Each Nare route once daily.    guaifenesin (MUCINEX) 600 mg 12 hr tablet Take 2 tablets (1,200 mg total) by mouth 2 (two) times daily.    loperamide (IMODIUM) 2 mg capsule Take 1 capsule (2 mg total) by mouth 4 (four) times daily as needed for Diarrhea.    methylPREDNISolone (MEDROL DOSEPACK) 4 mg tablet use as directed    phentermine (ADIPEX-P) 37.5 mg tablet Take 1 tablet (37.5 mg total) by mouth before breakfast.           Clinical Reference Information           Your Vitals Were     BP Pulse Temp Resp Height Weight    138/84 (BP Location: Right arm, Patient Position: Sitting, BP Method: Manual) 106 98.5 °F (36.9 °C) (Oral) 18 5' 9" (1.753 m) 195 kg (430 lb)    SpO2 BMI             96% 63.5 kg/m2         Blood Pressure          Most Recent Value    BP  138/84      Allergies as of 5/1/2017     Amlodipine    Azithromycin    Benazepril    Hydrocodone    Lotrel  [Amlodipine-benazepril]    Vicodin  [Hydrocodone-acetaminophen]      Immunizations Administered on Date of Encounter - " 5/1/2017     None      Orders Placed During Today's Visit      Normal Orders This Visit    POCT Rapid Strep A     Future Labs/Procedures Expected by Expires    CBC auto differential  5/1/2017 6/30/2018    Comprehensive metabolic panel  5/1/2017 6/30/2018    Hemoglobin A1c  5/1/2017 6/30/2018 5/1/2017 10:00 AM - Sandra Taylor LPN      Component Results     Component Value Flag Ref Range Units Status    Rapid Strep A Screen Negative  Negative  Final     Acceptable Yes    Final            Instructions      Pharyngitis (Sore Throat), Report Pending    Pharyngitis (sore throat) is often due to a virus. It can also be caused by the streptococcus, or strep, bacterium, often called strep throat. Both viral and strep infections can cause throat pain that is worse when swallowing, aching all over with headache, and fever. Both types of infections are contagious. They may be spread by coughing, kissing, or touching others after touching your mouth or nose.  A test has been done to find out whether you (or your child, if your child is the patient) have strep throat. Call this facility or your healthcare provider if you were not given your test results. If the test is positive for strep infection, you will need to take antibiotic medicines. A prescription can be called into your pharmacy at that time. If the test is negative, you probably have a viral pharyngitis. This does not need to be treated with antibiotics. Until you receive the results of the strep test, you should stay home from work. If your child is being tested, he or she should stay home from school.  Home care  · Rest at home. Drink plenty of fluids so you won't get dehydrated.  · If the test is positive for strep, don't go to work or school for the first 2 days of taking the antibiotics. After this time, you will not be contagious. You can then return to work or school if you are feeling better.   · Take the antibiotic medicine for the full  10 days, even if you feel better. This is very important to make sure the infection is treated. It is also important to prevent drug-resistant germs from developing. If you were given an antibiotic shot, you won't need more antibiotics.  · For children: Use acetaminophen for fever, fussiness, or discomfort. In infants older than 6 months of age, you may use ibuprofen instead of acetaminophen. Talk with your child's healthcare provider before giving these medicines if your child has chronic liver or kidney disease or ever had a stomach ulcer or GI bleeding. Never give aspirin to a child under 18 years of age who is ill with a fever. It may cause severe liver damage.  · For adults: Use acetaminophen or ibuprofen to control pain or fever, unless another medicine was prescribed for this. Talk with your healthcare provider before taking these medicines if you have chronic liver or kidney disease or ever had a stomach ulcer or GI bleeding.  · Use throat lozenges or numbing throat sprays to help reduce pain. Gargling with warm salt water will also help reduce throat pain. For this, dissolve 1/2 teaspoon of salt in 1 glass of warm water. To help soothe a sore throat, children can sip on juice or a popsicle. Children 5 years and older can also suck on a lollipop or hard candy.  · Don't eat salty or spicy foods. These can irritate the throat.  Follow-up care  Follow up with your healthcare provider or our staff if you don't get better over the next week.  When to seek medical advice  Call your healthcare provider right away if any of these occur:  · Fever as directed by your healthcare provider. For children, seek care if:  ¨ Your child is of any age and has repeated fevers above 104°F (40°C).  ¨ Your child is younger than 2 years of age and has a fever of 100.4°F (38°C) that continues for more than 1 day.  ¨ Your child is 2 years old or older and has a fever of 100.4°F (38°C) that continues for more than 3 days.  · New or  worsening ear pain, sinus pain, or headache  · Painful lumps in the back of neck  · Stiff neck  · Lymph nodes are getting larger  · Inability to swallow liquids, excessive drooling, or inability to open mouth wide due to throat pain  · Signs of dehydration (very dark urine or no urine, sunken eyes, dizziness)  · Trouble breathing or noisy breathing  · Muffled voice  · New rash  · Child appears to be getting sicker  Date Last Reviewed: 4/13/2015  © 9592-1347 [employer]. [address] [place] [address], [place] [address]. All rights reserved. This information is not intended as a substitute for professional medical care. Always follow your healthcare professional's instructions.             Language Assistance Services     ATTENTION: Language assistance services are available, free of charge. Please call [phone].      ATENCIÓN: Si walterla kalyanijay, tiene a martinez disposición servicios gratuitos de asistencia lingüística. Llame al [phone].     CHÚ Ý: N?u b?n nói Ti?ng Vi?t, có các d?ch v? h? tr? ngôn ng? mi?n phí dành cho b?n. G?i s? [phone].         [facility] complies with applicable Federal civil rights laws and does not discriminate on the basis of race, color, national origin, age, disability, or sex.

## 2017-05-01 NOTE — PATIENT INSTRUCTIONS

## 2017-05-02 LAB
ESTIMATED AVG GLUCOSE: 151 MG/DL
HBA1C MFR BLD HPLC: 6.9 %

## 2017-05-03 LAB — BACTERIA THROAT CULT: NORMAL

## 2017-06-16 ENCOUNTER — OFFICE VISIT (OUTPATIENT)
Dept: INTERNAL MEDICINE | Facility: CLINIC | Age: 47
End: 2017-06-16
Payer: COMMERCIAL

## 2017-06-16 VITALS
HEART RATE: 108 BPM | HEIGHT: 69 IN | BODY MASS INDEX: 43.4 KG/M2 | SYSTOLIC BLOOD PRESSURE: 126 MMHG | DIASTOLIC BLOOD PRESSURE: 80 MMHG | WEIGHT: 293 LBS

## 2017-06-16 DIAGNOSIS — Z12.31 ENCOUNTER FOR SCREENING MAMMOGRAM FOR BREAST CANCER: ICD-10-CM

## 2017-06-16 DIAGNOSIS — F41.8 DEPRESSION WITH ANXIETY: ICD-10-CM

## 2017-06-16 DIAGNOSIS — M25.541 METACARPOPHALANGEAL JOINT PAIN OF RIGHT HAND: ICD-10-CM

## 2017-06-16 DIAGNOSIS — N39.46 MIXED STRESS AND URGE URINARY INCONTINENCE: ICD-10-CM

## 2017-06-16 DIAGNOSIS — J30.2 SEASONAL ALLERGIC RHINITIS, UNSPECIFIED ALLERGIC RHINITIS TRIGGER: ICD-10-CM

## 2017-06-16 DIAGNOSIS — N28.9 RENAL INSUFFICIENCY: ICD-10-CM

## 2017-06-16 DIAGNOSIS — G47.33 OSA ON CPAP: ICD-10-CM

## 2017-06-16 DIAGNOSIS — E78.1 HYPERTRIGLYCERIDEMIA: ICD-10-CM

## 2017-06-16 DIAGNOSIS — I11.0 HYPERTENSIVE HEART DISEASE WITH DIASTOLIC HEART FAILURE: ICD-10-CM

## 2017-06-16 DIAGNOSIS — E66.01 MORBID OBESITY WITH BMI OF 60.0-69.9, ADULT: ICD-10-CM

## 2017-06-16 DIAGNOSIS — M25.542 METACARPOPHALANGEAL JOINT PAIN OF LEFT HAND: ICD-10-CM

## 2017-06-16 DIAGNOSIS — N91.2 AMENORRHEA: ICD-10-CM

## 2017-06-16 DIAGNOSIS — E11.9 TYPE 2 DIABETES MELLITUS WITHOUT COMPLICATION, WITHOUT LONG-TERM CURRENT USE OF INSULIN: Primary | ICD-10-CM

## 2017-06-16 DIAGNOSIS — I50.30 HYPERTENSIVE HEART DISEASE WITH DIASTOLIC HEART FAILURE: ICD-10-CM

## 2017-06-16 DIAGNOSIS — M47.26 OSTEOARTHRITIS OF SPINE WITH RADICULOPATHY, LUMBAR REGION: ICD-10-CM

## 2017-06-16 PROBLEM — M79.605 LUMBAR PAIN WITH RADIATION DOWN LEFT LEG: Status: RESOLVED | Noted: 2017-01-27 | Resolved: 2017-06-16

## 2017-06-16 PROBLEM — M54.50 LUMBAR PAIN WITH RADIATION DOWN LEFT LEG: Status: RESOLVED | Noted: 2017-01-27 | Resolved: 2017-06-16

## 2017-06-16 LAB
CREAT UR-MCNC: 235 MG/DL
MICROALBUMIN UR DL<=1MG/L-MCNC: 1372 UG/ML
MICROALBUMIN/CREATININE RATIO: 583.8 UG/MG

## 2017-06-16 PROCEDURE — 4010F ACE/ARB THERAPY RXD/TAKEN: CPT | Mod: S$GLB,,, | Performed by: INTERNAL MEDICINE

## 2017-06-16 PROCEDURE — 82570 ASSAY OF URINE CREATININE: CPT

## 2017-06-16 PROCEDURE — 99215 OFFICE O/P EST HI 40 MIN: CPT | Mod: S$GLB,,, | Performed by: INTERNAL MEDICINE

## 2017-06-16 PROCEDURE — 3044F HG A1C LEVEL LT 7.0%: CPT | Mod: S$GLB,,, | Performed by: INTERNAL MEDICINE

## 2017-06-16 PROCEDURE — 99999 PR PBB SHADOW E&M-EST. PATIENT-LVL IV: CPT | Mod: PBBFAC,,, | Performed by: INTERNAL MEDICINE

## 2017-06-18 RX ORDER — LISINOPRIL 40 MG/1
40 TABLET ORAL DAILY
Qty: 90 TABLET | Refills: 1 | Status: SHIPPED | OUTPATIENT
Start: 2017-06-18 | End: 2018-03-05 | Stop reason: SDUPTHER

## 2017-06-18 NOTE — PROGRESS NOTES
Subjective:       Patient ID: Kalee Dupree is a 46 y.o. female.    Chief Complaint: Establish Care    She is new to me. Last seen by [facility] six months ago. Presents for transfer of care. No acute complaints.     PMH: , misc x1.  Morbid Obesity, BMI>60. Her insurance declined to cover bariatric surgery. TSH repeatedly normal, 1.11 .   Hypertension since age 33 with Diastolic Dysfunction, EF 55% and no ischemia on Stress Echo .  Diabetes Type 2, HbA1c 6.9% May '17 - started Metformin.   Hypertriglyceridemia. TChol 201, , HDL 32, LDL ? .  Atypical Chest Pain, negative Coronary Angiogram  at [place] per history.   Proteinuria, FSGS, last seen by Nephrology at [place] years ago. Creat 1.3, GFR 49 May '17.  FADIA on CPAP.   Iron Deficiency Anemia, EGD and Colonoscopy .   Seasonal Allergic Rhinitis.   Depression with Anxiety, panic attacks in the past.   Lumbar Spondylosis with moderate spinal stenosis on MRI 10/15.  Urge and Stress Urinary Incontinence.     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    Pelvic exam years ago. Mammogram normal . Colonoscopy normal , repeat 5 yrs due to family history. Eye exam . Flu shot . Td .    Social: , 19 yo son. Remote tobacco use, 1/2 PPD age 15-22. Rare alcohol. 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     FMH: HTN in both parents and brother. DM in mother and brother. PAD in brother, legs were amputated before he  with complications of diabetes at age 47. Mother had Colon cancer in her early 50's, hyperlipidemia, CKD and thyroid disease.Lung cancer in father. Heart dis in PGF.     Allergies: Amlodipine, Azithromycin, Benazepril, Hydrocodone.    Medications: Amitriptyline 10mg, Citalopram 20mg, Flonase, Gabapentin 300mg - 4 caps BID, HCTZ 25mg daily, LIsinopril 40mg daily, Metoprolol XL 50mg daily, Metformin ER 500mg two  "daily, Tolterodine ER 4mg daily, Aleve prn, no vitamins. Had diet pills in the past including Topamax/Phentermine - would like to resume this.         Review of Systems   Constitutional: Positive for fatigue. Negative for activity change, appetite change, chills, diaphoresis, fever and unexpected weight change.   HENT: Negative for congestion, ear pain, hearing loss, rhinorrhea, sneezing, sore throat, trouble swallowing and voice change.    Eyes: Negative for photophobia, pain and discharge.   Respiratory: Negative for cough, chest tightness, shortness of breath and wheezing.    Cardiovascular: Positive for palpitations. Negative for chest pain and leg swelling.   Gastrointestinal: Positive for diarrhea. Negative for abdominal pain, blood in stool, constipation, nausea and vomiting.   Endocrine: Negative for polydipsia and polyuria.   Genitourinary: Positive for menstrual problem and urgency. Negative for difficulty urinating, dysuria, flank pain, frequency, hematuria, vaginal bleeding and vaginal discharge.        LMP 3 years ago.   Musculoskeletal: Positive for arthralgias, back pain, joint swelling and neck pain.        Joint pain in MCP joints of both hands with redness and swelling.    Skin: Negative for rash and wound.   Neurological: Positive for weakness and headaches. Negative for dizziness, seizures, syncope, facial asymmetry, speech difficulty and numbness.   Hematological: Negative for adenopathy. Does not bruise/bleed easily.   Psychiatric/Behavioral: Positive for dysphoric mood. Negative for confusion, decreased concentration, sleep disturbance and suicidal ideas. The patient is nervous/anxious.        Objective:    /80, Pulse 108, Ht 5' 9", Wt 451 lbs, BMI=66.6  Physical Exam   Constitutional: She is oriented to person, place, and time. No distress.   Morbidly obese woman, ambulatory with a normal gait in no distress.    HENT:   Head: Normocephalic and atraumatic.   Right Ear: External ear " normal.   Left Ear: External ear normal.   Nose: Nose normal.   Mouth/Throat: Oropharynx is clear and moist. No oropharyngeal exudate.   Eyes: Conjunctivae and EOM are normal. Pupils are equal, round, and reactive to light. Right conjunctiva is not injected. Left conjunctiva is not injected. No scleral icterus.   Neck: Normal range of motion. Neck supple. No JVD present. Carotid bruit is not present. No thyromegaly present.   Cardiovascular: Normal rate, regular rhythm and normal heart sounds.  Exam reveals no gallop and no friction rub.    No murmur heard.  Pulmonary/Chest: Effort normal and breath sounds normal. No respiratory distress. She has no wheezes. She has no rhonchi. She has no rales.   Abdominal: Soft. Bowel sounds are normal. She exhibits no distension. There is no hepatosplenomegaly. There is no tenderness. There is no CVA tenderness.   Musculoskeletal: Normal range of motion. She exhibits no edema, tenderness or deformity.   Protective Sensation (w/ 10 gram monofilament):  Right: Intact  Left: Intact    Visual Inspection:  Normal -  Bilateral except very dry skin on feet.     Pedal Pulses:   Right: Present  Left: Present    Posterior tibialis:   Right:Present  Left: Present     Lymphadenopathy:     She has no cervical adenopathy.   Neurological: She is alert and oriented to person, place, and time. She has normal strength. No cranial nerve deficit. She exhibits normal muscle tone. Coordination and gait normal.   Skin: Skin is warm and dry. No lesion and no rash noted. She is not diaphoretic. No cyanosis or erythema. No pallor. Nails show no clubbing.   Psychiatric: She has a normal mood and affect. Her behavior is normal. Thought content normal.   Vitals reviewed.      Assessment:       1. Type 2 diabetes mellitus without complication, without long-term current use of insulin    2. Hypertensive heart disease with diastolic heart failure    3. Hypertriglyceridemia    4. Renal insufficiency    5. Morbid  obesity with BMI of 60.0-69.9, adult    6. FADIA on CPAP    7. Osteoarthritis of spine with radiculopathy, lumbar region    8. Mixed stress and urge urinary incontinence    9. Depression with anxiety    10. Seasonal allergic rhinitis, unspecified allergic rhinitis trigger    11. Metacarpophalangeal joint pain of left hand    12. Metacarpophalangeal joint pain of right hand    13. Amenorrhea    14. Encounter for screening mammogram for breast cancer        Plan:       Type 2 diabetes mellitus without complication, without long-term current use of insulin  -     Microalbumin/creatinine urine ratio  -     Ambulatory Referral to Podiatry    Hypertensive heart disease with diastolic heart failure - stable on current regimen.    Hypertriglyceridemia  -     Lipid panel; Future; Expected date: 06/16/2017    Renal insufficiency  -     Basic metabolic panel; Future; Expected date: 06/16/2017    Morbid obesity with BMI of 60.0-69.9, adult        -     Counseled on diet, consider resuming Topamax/Phentermine upon review of labs.     FADIA on CPAP - encouraged compliance.     Osteoarthritis of spine with radiculopathy, lumbar region - stable.     Mixed stress and urge urinary incontinence - meds per Urology.    Depression with anxiety - stable on Citalopram.    Seasonal allergic rhinitis, unspecified allergic rhinitis trigger    Metacarpophalangeal joint pain of left hand  -     Rheumatoid factor; Future; Expected date: 06/16/2017  -     MARIBELL; Future; Expected date: 06/16/2017  -     Sedimentation rate, manual; Future; Expected date: 06/16/2017  -     C-reactive protein; Future; Expected date: 06/16/2017    Metacarpophalangeal joint pain of right hand  -     Rheumatoid factor; Future; Expected date: 06/16/2017  -     MARIBELL; Future; Expected date: 06/16/2017  -     Sedimentation rate, manual; Future; Expected date: 06/16/2017  -     C-reactive protein; Future; Expected date: 06/16/2017    Amenorrhea  -     Ambulatory Referral to  Gynecology    Encounter for screening mammogram for breast cancer  -     Mammo Digital Screening Bilat with CAD; Future; Expected date: 06/16/2017

## 2017-06-21 ENCOUNTER — HOSPITAL ENCOUNTER (OUTPATIENT)
Dept: RADIOLOGY | Facility: HOSPITAL | Age: 47
Discharge: HOME | End: 2017-06-21
Attending: INTERNAL MEDICINE
Payer: COMMERCIAL

## 2017-06-21 DIAGNOSIS — Z12.31 ENCOUNTER FOR SCREENING MAMMOGRAM FOR BREAST CANCER: ICD-10-CM

## 2017-06-21 PROCEDURE — 77067 SCR MAMMO BI INCL CAD: CPT | Mod: 26,,, | Performed by: RADIOLOGY

## 2017-06-21 PROCEDURE — 77067 SCR MAMMO BI INCL CAD: CPT | Mod: TC

## 2017-07-03 ENCOUNTER — OFFICE VISIT (OUTPATIENT)
Dept: PODIATRY | Facility: CLINIC | Age: 47
End: 2017-07-03
Payer: COMMERCIAL

## 2017-07-03 VITALS — BODY MASS INDEX: 43.4 KG/M2 | HEIGHT: 69 IN | WEIGHT: 293 LBS

## 2017-07-03 DIAGNOSIS — R20.2 PARESTHESIAS: ICD-10-CM

## 2017-07-03 DIAGNOSIS — M20.42 HAMMER TOES OF BOTH FEET: ICD-10-CM

## 2017-07-03 DIAGNOSIS — R60.0 BILATERAL LOWER EXTREMITY EDEMA: ICD-10-CM

## 2017-07-03 DIAGNOSIS — B35.1 ONYCHOMYCOSIS DUE TO DERMATOPHYTE: ICD-10-CM

## 2017-07-03 DIAGNOSIS — M20.41 HAMMER TOES OF BOTH FEET: ICD-10-CM

## 2017-07-03 DIAGNOSIS — L84 CORN OR CALLUS: ICD-10-CM

## 2017-07-03 DIAGNOSIS — E11.49 TYPE II DIABETES MELLITUS WITH NEUROLOGICAL MANIFESTATIONS: Primary | ICD-10-CM

## 2017-07-03 PROCEDURE — 3044F HG A1C LEVEL LT 7.0%: CPT | Mod: S$GLB,,, | Performed by: PODIATRIST

## 2017-07-03 PROCEDURE — 99999 PR PBB SHADOW E&M-EST. PATIENT-LVL III: CPT | Mod: PBBFAC,,, | Performed by: PODIATRIST

## 2017-07-03 PROCEDURE — 99203 OFFICE O/P NEW LOW 30 MIN: CPT | Mod: 25,S$GLB,, | Performed by: PODIATRIST

## 2017-07-03 PROCEDURE — 11721 DEBRIDE NAIL 6 OR MORE: CPT | Mod: S$GLB,,, | Performed by: PODIATRIST

## 2017-07-03 PROCEDURE — 4010F ACE/ARB THERAPY RXD/TAKEN: CPT | Mod: S$GLB,,, | Performed by: PODIATRIST

## 2017-07-03 NOTE — LETTER
July 3, 2017      Colette Pool MD  [address]  [facility] [address]           [facility]  [address] [facility] [address]  [place] [address]  Phone: [phone]          Patient: Kalee Dupree   MR Number: [id number]   YOB: 1970   Date of Visit: 7/3/2017       Dear Dr. Colette Pool:    Thank you for referring Kalee Dupree to me for evaluation. Attached you will find relevant portions of my assessment and plan of care.    If you have questions, please do not hesitate to call me. I look forward to following Kalee Dupree along with you.    Sincerely,    Abdon Fletcher DPM    Enclosure  CC:  No Recipients    If you would like to receive this communication electronically, please contact [email or web address] or [phone] to request more information on [employer] access.    For providers and/or their staff who would like to refer a patient to [facility], please contact us through our one-stop-shop provider referral line, Jorge Mei, at [phone].    If you feel you have received this communication in error or would no longer like to receive these types of communications, please e-mail [email or web address]

## 2017-07-03 NOTE — PATIENT INSTRUCTIONS
Shoe recommendations:   asics (GT 1000 or gel foundations), new balance, saucony, boone, vionix (tennis shoe)    sofft brand, clarks, crocs, aerosoles, naturalizers, SAS, ecco, jimbo, aracely houser (dress shoes)    Vionix, volitiles, burkenstocks, (sandals)    Nike comfort thong sandals (house shoes)  Recommend lotions: eucerin, aquaphor, A&D ointment, gold bond for diabetics    Apply Vicks Vapor rub to nail bed once daily   Diabetes: Inspecting Your Feet    Diabetes increases your chances of developing foot problems. So inspect your feet every day. This helps you find small skin irritations before they become serious ulcers or infections. If you have trouble seeing the bottoms of your feet, use a mirror or ask a family member or friend to help.  How to check your feet  Below are tips to help you look for foot problems. Try to check your feet at the same time each day, such as when you get out of bed in the morning:  · Check the top of each foot. The tops of toes, back of the heel, and outer edge of the foot can get a lot of rubbing from poor-fitting shoes.  · Check the bottom of each foot. Daily wear and tear often leads to problems at pressure spots.  · Check the toes and nails. Fungal infections often occur between toes. Toenail problems can also be a sign of fungal infections or lead to breaks in the skin.  · Check your shoes, too. Loose objects inside a shoe can injure the foot. Use your hand to feel inside your shoes for things like jensen, loose stitching, or rough areas that could irritate your skin.  Warning signs  Look for any color changes in the foot. Redness with streaks can signal a severe infection, which needs immediate medical attention. Tell your healthcare provider right away if you have any of these problems:  · Swelling, sometimes with color changes, may be a sign of poor blood flow or infection. Symptoms include tenderness and an increase in the size of your foot.  · Warm or hot areas on your  feet may be signs of infection. A foot that is cold may not be getting enough blood.  · Sensations such as burning, tingling, or pins and needles can be signs of a problem. Also check for areas that may be numb.  · Hot spots are caused by friction or pressure. Look for hot spots in areas that get a lot of rubbing. Hot spots can turn into blisters, calluses, or sores.  · Cracks and sores are caused by dry or irritated skin. They are a sign that the skin is breaking down, which can lead to infection.  · Toenail problems to watch for include nails growing into the skin (ingrown toenail) and causing redness or pain. Thick, yellow, or discolored nails can signal a fungal infection.  · Drainage and odor can develop from untreated sores and ulcers. Call your healthcare provider right away if you notice white or yellow drainage, bleeding, or unpleasant odor.   Date Last Reviewed: 6/1/2016  © 1456-5866 [employer]. [address] [place] [address], [place] [address]. All rights reserved. This information is not intended as a substitute for professional medical care. Always follow your healthcare professional's instructions.

## 2017-07-03 NOTE — PROGRESS NOTES
Subjective:      Patient ID: Kalee Dupree is a 46 y.o. female.    Chief Complaint: Diabetes Mellitus (dry skin Pcp Dr. Pool 06/16/2017); Diabetic Foot Exam; and Nail Care    Kalee is a 46 y.o. female who presents to the clinic for evaluation and treatment of high risk feet. Kalee has a past medical history of Allergy; Anemia; Anxiety; Depression; Diabetes mellitus, type 2; Focal segmental glomerulosclerosis; Hyperlipidemia; Hypertension; Lumbar disc disease; Morbid obesity; FADIA on CPAP; Proteinuria; and Retinal artery occlusion. The patient's chief complaint is long, thick toenails that are difficult for her to trim. Often causes bleeding when she trims her nails. Has pain in both feet but attributes this to her diabetic neuropathy and low back pain/disc problem and sciatica. Takes gabapentin which helps ease the pain most of the time. Patient is here to have comprehensive DM foot exam and to establish care per recommendations of PCP. Inquiring about DM shoes and has general questions regarding her foot health and DM. Also has generalized pain in hammertoes. This patient has documented high risk feet requiring routine maintenance secondary to diabetes mellitis and those secondary complications of diabetes, as mentioned..    PCP: Colette Pool MD    Date Last Seen by PCP:   Chief Complaint   Patient presents with    Diabetes Mellitus     dry skin Pcp Dr. Pool 06/16/2017    Diabetic Foot Exam    Nail Care       Current shoe gear:   Slip-on shoes         Hemoglobin A1C   Date Value Ref Range Status   05/01/2017 6.9 (H) 4.5 - 6.2 % Final     Comment:     According to ADA guidelines, hemoglobin A1C <7.0% represents  optimal control in non-pregnant diabetic patients.  Different  metrics may apply to specific populations.   Standards of Medical Care in Diabetes - 2016.  For the purpose of screening for the presence of diabetes:  <5.7%     Consistent with the absence of diabetes  5.7-6.4%  Consistent  with increasing risk for diabetes   (prediabetes)  >or=6.5%  Consistent with diabetes  Currently no consensus exists for use of hemoglobin A1C  for diagnosis of diabetes for children.     2016 6.4 (H) 4.5 - 6.2 % Final   2015 6.2 4.5 - 6.2 % Final     Past Medical History:   Diagnosis Date    Allergy     Anemia     Anxiety     Depression     Diabetes mellitus, type 2     Focal segmental glomerulosclerosis     Hyperlipidemia     Hypertension     Lumbar disc disease     Morbid obesity     FADIA on CPAP     Proteinuria     Retinal artery occlusion        Past Surgical History:   Procedure Laterality Date     SECTION       SECTION      CHOLECYSTECTOMY      DILATION AND CURETTAGE OF UTERUS      EYE SURGERY         Family History   Problem Relation Age of Onset    Cancer Mother 56     colon    Diabetes Mother     Hyperlipidemia Mother     Hypertension Mother     Obesity Mother     Kidney disease Mother     Thyroid disease Mother     Colon cancer Mother     Cancer Father      lung    Hypertension Father     Diabetes Brother     Hypertension Brother     Heart disease Paternal Grandfather        Social History     Social History    Marital status:      Spouse name: N/A    Number of children: 1    Years of education: N/A     Occupational History     [email or web address]     Social History Main Topics    Smoking status: Former Smoker     Packs/day: 0.50     Years: 7.00    Smokeless tobacco: None      Comment: Quit .    Alcohol use Yes      Comment: Rare.     Drug use: No    Sexual activity: Yes     Partners: Male     Other Topics Concern    None     Social History Narrative    Several family members on mother side with aneurysms heart and aorta       Current Outpatient Prescriptions   Medication Sig Dispense Refill    amitriptyline (ELAVIL) 10 MG tablet Take 1 tablet (10 mg total) by mouth every evening. 30 tablet 11    citalopram (CELEXA) 20  MG tablet TAKE 1 TABLET (20 MG TOTAL) BY MOUTH ONCE DAILY. 90 tablet 1    fluticasone (FLONASE) 50 mcg/actuation nasal spray 1 spray by Each Nare route once daily. 16 g 1    gabapentin (NEURONTIN) 300 MG capsule TAKE 3 CAPSULES (900 MG TOTAL) BY MOUTH 3 (THREE) TIMES DAILY.  5    hydrochlorothiazide (HYDRODIURIL) 25 MG tablet TAKE 1 TABLET (25 MG TOTAL) BY MOUTH ONCE DAILY. 90 tablet 1    lisinopril (PRINIVIL,ZESTRIL) 40 MG tablet Take 1 tablet (40 mg total) by mouth once daily. 90 tablet 1    metformin (FORTAMET) 500 mg 24 hr tablet Take 2 tablets (1,000 mg total) by mouth daily with breakfast. 180 tablet 1    metoprolol succinate (TOPROL-XL) 50 MG 24 hr tablet Take 1 tablet (50 mg total) by mouth once daily. 90 tablet 3    phentermine (ADIPEX-P) 37.5 mg tablet Take 1 tablet (37.5 mg total) by mouth before breakfast. 30 tablet 2    tolterodine (DETROL LA) 4 MG 24 hr capsule TAKE 1 CAPSULE (4 MG TOTAL) BY MOUTH ONCE DAILY. 30 capsule 2     No current facility-administered medications for this visit.        Review of patient's allergies indicates:   Allergen Reactions    Amlodipine      Other reaction(s): Unknown    Azithromycin      Stomach pain, and cramping    Benazepril      Other reaction(s): Unknown    Hydrocodone      Other reaction(s): Unknown    Lotrel  [amlodipine-benazepril] Itching    Vicodin  [hydrocodone-acetaminophen] Hives and Itching       Review of Systems   Constitution: Negative for chills and fever.   Cardiovascular: Positive for leg swelling. Negative for chest pain and claudication.   Respiratory: Negative for cough and shortness of breath.    Skin: Positive for dry skin and nail changes.   Musculoskeletal:        Toe pain   Gastrointestinal: Negative for nausea and vomiting.   Neurological: Positive for numbness, paresthesias and sensory change.   Psychiatric/Behavioral: Negative for altered mental status.           Objective:      Physical Exam   Constitutional: She is oriented  to person, place, and time. She appears well-developed and well-nourished.   HENT:   Head: Normocephalic.   Cardiovascular: Intact distal pulses.    Pulses:       Dorsalis pedis pulses are 1+ on the right side, and 1+ on the left side.        Posterior tibial pulses are 1+ on the right side, and 1+ on the left side.   CRT < 3 sec to tips of toes. ++ edema noted to b/l LE. Mild spider veins and vericosities noted to b/l LEs.      Pulmonary/Chest: No respiratory distress.   Musculoskeletal:   Gastrocnemius equinus noted to b/l ankles with decreased DF noted on exam. MMT 5/5 in DF/PF/Inv/Ev resistance with no reproduction of pain in any direction. Passive range of motion of ankle and pedal joints is painless. Adequate pedal joint ROM.     Semi-rigid hammertoe contractures noted to toes 2-4 b/l-asymptomatic. Plantarflexed 1st ray bilateral with pes cavus foot type b/l.     + mild pain with palpation and ROM of 2nd digit PIPJ bilateral.    Feet:   Right Foot:   Protective Sensation: 10 sites tested. 7 sites sensed.   Left Foot:   Protective Sensation: 10 sites tested. 7 sites sensed.   Neurological: She is alert and oriented to person, place, and time. She has normal strength. A sensory deficit is present.   Light touch, proprioception, and sharp/dull sensation are all intact bilaterally. Protective threshold with the Bulan-Wienstein monofilament is diminished at toes bilaterally. Vibratory sensation diminished b/l distal foot. Subjective paresthesias with no clearly identifiable source or trigger.      Skin: Skin is warm, dry and intact. No erythema.   No open lesions, lacerations or wounds noted. Nails are thickened, elongated, discolored yellow/brown with subungual debris and brittleness to R 1-5 and L 1-5. Interdigital spaces clean, dry and intact b/l. No erythema noted to b/l foot. Skin texture thin, atrophic, dry. Pedal hair diminished. Toes cool to touch. Hyperkeratotic tissue noted to distal tips of toes 2 and 3  bilateral.    Psychiatric: She has a normal mood and affect. Her behavior is normal. Judgment and thought content normal.   Vitals reviewed.            Assessment:       Encounter Diagnoses   Name Primary?    Type II diabetes mellitus with neurological manifestations Yes    Bilateral lower extremity edema     Paresthesias     Onychomycosis due to dermatophyte     Hammer toes of both feet     Corn or callus          Plan:       Kalee was seen today for diabetes mellitus, diabetic foot exam and nail care.    Diagnoses and all orders for this visit:    Type II diabetes mellitus with neurological manifestations  -     DIABETIC SHOES FOR HOME USE    Bilateral lower extremity edema  -     DIABETIC SHOES FOR HOME USE    Paresthesias  -     DIABETIC SHOES FOR HOME USE    Onychomycosis due to dermatophyte    Hammer toes of both feet  -     DIABETIC SHOES FOR HOME USE    Corn or callus  -     DIABETIC SHOES FOR HOME USE      I counseled the patient on her conditions, their implications and medical management.        - Shoe inspection. Diabetic Foot Education. Patient reminded of the importance of good nutrition and blood sugar control to help prevent podiatric complications of diabetes. Patient instructed on proper foot hygeine. We discussed wearing proper shoe gear, daily foot inspections, never walking without protective shoe gear, never putting sharp instruments to feet, routine podiatric nail visits every 2-3 months.      - With patient's permission, nails were aggressively reduced and debrided x 10 to their soft tissue attachment mechanically and with electric , removing all offending nail and debris. Patient relates relief following the procedure. She will continue to monitor the areas daily, inspect her feet, wear protective shoe gear when ambulatory, moisturizer to maintain skin integrity and follow in this office in approximately 2-3 months, sooner p.r.n.    Rx diabetic shoes with custom molded inserts  to be worn at all times while ambulating. Prescription provided with list of local retailers.     Discussed regular and routine moisturizer to skin of both feet to help improve dry skin. Advised to apply twice daily until resolution of symptoms. Avoid between toes.     Discussed application of Chris's vaporub on affected toenails for up to 1 year for improvement in appearance and fungal infection.     RTC 3 months, sooner PRN

## 2017-07-04 ENCOUNTER — PATIENT MESSAGE (OUTPATIENT)
Dept: INTERNAL MEDICINE | Facility: CLINIC | Age: 47
End: 2017-07-04

## 2017-07-04 DIAGNOSIS — E78.1 HYPERTRIGLYCERIDEMIA: Primary | ICD-10-CM

## 2017-07-04 RX ORDER — SIMVASTATIN 20 MG/1
20 TABLET, FILM COATED ORAL NIGHTLY
Qty: 30 TABLET | Refills: 3 | Status: SHIPPED | OUTPATIENT
Start: 2017-07-04 | End: 2018-05-17 | Stop reason: SDUPTHER

## 2017-07-06 ENCOUNTER — PATIENT MESSAGE (OUTPATIENT)
Dept: INTERNAL MEDICINE | Facility: CLINIC | Age: 47
End: 2017-07-06

## 2017-07-06 DIAGNOSIS — E11.9 TYPE 2 DIABETES MELLITUS WITHOUT COMPLICATION, WITHOUT LONG-TERM CURRENT USE OF INSULIN: Primary | ICD-10-CM

## 2017-07-06 RX ORDER — INSULIN PUMP SYRINGE, 3 ML
EACH MISCELLANEOUS
Qty: 1 EACH | Refills: 0 | Status: SHIPPED | OUTPATIENT
Start: 2017-07-06 | End: 2017-09-18

## 2017-07-06 RX ORDER — LANCETS
1 EACH MISCELLANEOUS DAILY
Qty: 100 EACH | Refills: 3 | Status: SHIPPED | OUTPATIENT
Start: 2017-07-06

## 2017-07-17 ENCOUNTER — PATIENT MESSAGE (OUTPATIENT)
Dept: INTERNAL MEDICINE | Facility: CLINIC | Age: 47
End: 2017-07-17

## 2017-08-01 ENCOUNTER — PATIENT MESSAGE (OUTPATIENT)
Dept: ADMINISTRATIVE | Facility: OTHER | Age: 47
End: 2017-08-01

## 2017-08-03 ENCOUNTER — TELEPHONE (OUTPATIENT)
Dept: INTERNAL MEDICINE | Facility: CLINIC | Age: 47
End: 2017-08-03

## 2017-08-03 NOTE — TELEPHONE ENCOUNTER
----- Message from Hong Daugherty MA sent at 8/3/2017  9:48 AM CDT -----  Contact: Jose Enrique franco/Diabetes management - [phone] Fax # [phone]   Please advise status of diabetic shoe orders faxed on 8/1/17. Only the Signed Orders are needed. Please fax Attn: Tiffany Please call. Thanks!

## 2017-08-07 ENCOUNTER — PATIENT MESSAGE (OUTPATIENT)
Dept: NEUROLOGY | Facility: CLINIC | Age: 47
End: 2017-08-07

## 2017-08-26 ENCOUNTER — PATIENT MESSAGE (OUTPATIENT)
Dept: ADMINISTRATIVE | Facility: OTHER | Age: 47
End: 2017-08-26

## 2017-08-26 ENCOUNTER — PATIENT MESSAGE (OUTPATIENT)
Dept: INTERNAL MEDICINE | Facility: CLINIC | Age: 47
End: 2017-08-26

## 2017-08-30 RX ORDER — METFORMIN HYDROCHLORIDE EXTENDED-RELEASE TABLETS 500 MG/1
1000 TABLET, FILM COATED, EXTENDED RELEASE ORAL
Qty: 180 TABLET | Refills: 1 | Status: SHIPPED | OUTPATIENT
Start: 2017-08-30 | End: 2017-09-19

## 2017-09-18 ENCOUNTER — IMMUNIZATION (OUTPATIENT)
Dept: INTERNAL MEDICINE | Facility: CLINIC | Age: 47
End: 2017-09-18
Payer: COMMERCIAL

## 2017-09-18 ENCOUNTER — OFFICE VISIT (OUTPATIENT)
Dept: INTERNAL MEDICINE | Facility: CLINIC | Age: 47
End: 2017-09-18
Payer: COMMERCIAL

## 2017-09-18 VITALS
SYSTOLIC BLOOD PRESSURE: 136 MMHG | BODY MASS INDEX: 43.4 KG/M2 | HEART RATE: 103 BPM | WEIGHT: 293 LBS | HEIGHT: 69 IN | DIASTOLIC BLOOD PRESSURE: 84 MMHG

## 2017-09-18 DIAGNOSIS — E11.9 TYPE 2 DIABETES MELLITUS WITHOUT COMPLICATION, WITHOUT LONG-TERM CURRENT USE OF INSULIN: ICD-10-CM

## 2017-09-18 DIAGNOSIS — I11.0 HYPERTENSIVE HEART DISEASE WITH DIASTOLIC HEART FAILURE: Primary | ICD-10-CM

## 2017-09-18 DIAGNOSIS — E66.01 MORBID OBESITY WITH BMI OF 60.0-69.9, ADULT: ICD-10-CM

## 2017-09-18 DIAGNOSIS — Z23 INFLUENZA VACCINE NEEDED: ICD-10-CM

## 2017-09-18 DIAGNOSIS — F41.8 DEPRESSION WITH ANXIETY: ICD-10-CM

## 2017-09-18 DIAGNOSIS — E78.1 HYPERTRIGLYCERIDEMIA: ICD-10-CM

## 2017-09-18 DIAGNOSIS — I50.30 HYPERTENSIVE HEART DISEASE WITH DIASTOLIC HEART FAILURE: Primary | ICD-10-CM

## 2017-09-18 PROCEDURE — 3075F SYST BP GE 130 - 139MM HG: CPT | Mod: S$GLB,,, | Performed by: INTERNAL MEDICINE

## 2017-09-18 PROCEDURE — 3008F BODY MASS INDEX DOCD: CPT | Mod: S$GLB,,, | Performed by: INTERNAL MEDICINE

## 2017-09-18 PROCEDURE — 3079F DIAST BP 80-89 MM HG: CPT | Mod: S$GLB,,, | Performed by: INTERNAL MEDICINE

## 2017-09-18 PROCEDURE — 90686 IIV4 VACC NO PRSV 0.5 ML IM: CPT | Mod: S$GLB,,, | Performed by: INTERNAL MEDICINE

## 2017-09-18 PROCEDURE — 99999 PR PBB SHADOW E&M-EST. PATIENT-LVL III: CPT | Mod: PBBFAC,,, | Performed by: INTERNAL MEDICINE

## 2017-09-18 PROCEDURE — 99214 OFFICE O/P EST MOD 30 MIN: CPT | Mod: S$GLB,,, | Performed by: INTERNAL MEDICINE

## 2017-09-18 PROCEDURE — 90471 IMMUNIZATION ADMIN: CPT | Mod: S$GLB,,, | Performed by: INTERNAL MEDICINE

## 2017-09-18 PROCEDURE — 3044F HG A1C LEVEL LT 7.0%: CPT | Mod: S$GLB,,, | Performed by: INTERNAL MEDICINE

## 2017-09-18 PROCEDURE — 4010F ACE/ARB THERAPY RXD/TAKEN: CPT | Mod: S$GLB,,, | Performed by: INTERNAL MEDICINE

## 2017-09-18 RX ORDER — LANCETS 28 GAUGE
EACH MISCELLANEOUS
Refills: 3 | COMMUNITY
Start: 2017-07-07 | End: 2021-04-23

## 2017-09-18 RX ORDER — METOPROLOL SUCCINATE 100 MG/1
100 TABLET, EXTENDED RELEASE ORAL DAILY
Qty: 30 TABLET | Refills: 3 | Status: SHIPPED | OUTPATIENT
Start: 2017-09-18 | End: 2018-02-05 | Stop reason: SDUPTHER

## 2017-09-18 RX ORDER — HYDROCHLOROTHIAZIDE 25 MG/1
25 TABLET ORAL DAILY
Qty: 90 TABLET | Refills: 1 | Status: SHIPPED | OUTPATIENT
Start: 2017-09-18 | End: 2018-06-19 | Stop reason: SDUPTHER

## 2017-09-18 RX ORDER — CITALOPRAM 40 MG/1
40 TABLET, FILM COATED ORAL DAILY
Qty: 30 TABLET | Refills: 3 | Status: SHIPPED | OUTPATIENT
Start: 2017-09-18 | End: 2018-02-05 | Stop reason: SDUPTHER

## 2017-09-19 ENCOUNTER — PATIENT MESSAGE (OUTPATIENT)
Dept: INTERNAL MEDICINE | Facility: CLINIC | Age: 47
End: 2017-09-19

## 2017-09-19 DIAGNOSIS — E11.9 TYPE 2 DIABETES MELLITUS WITHOUT COMPLICATION, WITHOUT LONG-TERM CURRENT USE OF INSULIN: Primary | ICD-10-CM

## 2017-09-19 RX ORDER — METFORMIN HYDROCHLORIDE 750 MG/1
1500 TABLET, EXTENDED RELEASE ORAL
Qty: 60 TABLET | Refills: 11 | Status: SHIPPED | OUTPATIENT
Start: 2017-09-19 | End: 2018-08-20

## 2017-12-04 DIAGNOSIS — M54.32 BILATERAL SCIATICA: ICD-10-CM

## 2017-12-04 DIAGNOSIS — M54.31 BILATERAL SCIATICA: ICD-10-CM

## 2017-12-04 DIAGNOSIS — M54.17 LUMBOSACRAL RADICULOPATHY: ICD-10-CM

## 2017-12-04 RX ORDER — AMITRIPTYLINE HYDROCHLORIDE 10 MG/1
10 TABLET, FILM COATED ORAL NIGHTLY
Qty: 30 TABLET | Refills: 8 | Status: SHIPPED | OUTPATIENT
Start: 2017-12-04 | End: 2020-03-25 | Stop reason: SDUPTHER

## 2017-12-09 ENCOUNTER — PATIENT MESSAGE (OUTPATIENT)
Dept: CARDIOLOGY | Facility: CLINIC | Age: 47
End: 2017-12-09

## 2017-12-11 ENCOUNTER — PATIENT MESSAGE (OUTPATIENT)
Dept: CARDIOLOGY | Facility: CLINIC | Age: 47
End: 2017-12-11

## 2017-12-13 ENCOUNTER — PATIENT MESSAGE (OUTPATIENT)
Dept: CARDIOLOGY | Facility: CLINIC | Age: 47
End: 2017-12-13

## 2018-01-15 ENCOUNTER — OFFICE VISIT (OUTPATIENT)
Dept: CARDIOLOGY | Facility: CLINIC | Age: 48
End: 2018-01-15
Payer: COMMERCIAL

## 2018-01-15 VITALS
OXYGEN SATURATION: 95 % | WEIGHT: 293 LBS | HEART RATE: 93 BPM | BODY MASS INDEX: 43.4 KG/M2 | SYSTOLIC BLOOD PRESSURE: 184 MMHG | DIASTOLIC BLOOD PRESSURE: 80 MMHG | HEIGHT: 69 IN

## 2018-01-15 DIAGNOSIS — I10 HTN (HYPERTENSION): ICD-10-CM

## 2018-01-15 DIAGNOSIS — G47.33 OSA ON CPAP: ICD-10-CM

## 2018-01-15 DIAGNOSIS — I11.0 HYPERTENSIVE HEART DISEASE WITH DIASTOLIC HEART FAILURE: Primary | ICD-10-CM

## 2018-01-15 DIAGNOSIS — I50.30 HYPERTENSIVE HEART DISEASE WITH DIASTOLIC HEART FAILURE: Primary | ICD-10-CM

## 2018-01-15 DIAGNOSIS — E66.01 MORBID OBESITY WITH BMI OF 60.0-69.9, ADULT: ICD-10-CM

## 2018-01-15 DIAGNOSIS — E11.9 TYPE 2 DIABETES MELLITUS WITHOUT COMPLICATION, WITHOUT LONG-TERM CURRENT USE OF INSULIN: ICD-10-CM

## 2018-01-15 DIAGNOSIS — R07.89 CHEST PAIN, ATYPICAL: ICD-10-CM

## 2018-01-15 PROCEDURE — 99214 OFFICE O/P EST MOD 30 MIN: CPT | Mod: S$GLB,,, | Performed by: INTERNAL MEDICINE

## 2018-01-15 PROCEDURE — 93000 ELECTROCARDIOGRAM COMPLETE: CPT | Mod: S$GLB,,, | Performed by: INTERNAL MEDICINE

## 2018-01-15 PROCEDURE — 99999 PR PBB SHADOW E&M-EST. PATIENT-LVL IV: CPT | Mod: PBBFAC,,, | Performed by: INTERNAL MEDICINE

## 2018-01-15 RX ORDER — FUROSEMIDE 40 MG/1
40 TABLET ORAL DAILY PRN
Qty: 30 TABLET | Refills: 11 | Status: SHIPPED | OUTPATIENT
Start: 2018-01-15 | End: 2018-08-20

## 2018-01-15 NOTE — PROGRESS NOTES
Subjective:    Patient ID:  Kalee Dupree is a 47 y.o. female who presents for follow-up of Chest Pain      HPI     Last saw me 1/4/16    CP, FADIA on CPAP, Obesity, HTN    DSE 2/12/16    1 - Normal left ventricular systolic function (EF 55-60%).  Normal resting wall motion.     2 - Concentric hypertrophy.     3 - Left ventricular diastolic dysfunction.     4 - Normal appearing valves and Doppler exam.     No evidence of stress induced myocardial ischemia.     Echo 3/11/14  1 - Low normal left ventricular systolic function (EF 50-55%).   2 - Eccentric hypertrophy.   3 - Mild left ventricular enlargement.   4 - Left atrial enlargement.   5 - Left ventricular diastolic dysfunction.   6 - Right ventricular enlargement with normal systolic function.   7 - Mild aortic regurgitation.   8 - Trivial mitral regurgitation.   9 - Trivial tricuspid regurgitation.    Records from  admission 11/14/14 show [facility]  EDP 18, EF 60%, normal coronaries  At that time peak troponin was 1.4    Now having worsening DAY with some chest tightness  EKG NSR - ok  Dependant LE edema      Review of Systems   Constitution: Negative for decreased appetite.   HENT: Negative for ear discharge.    Eyes: Negative for blurred vision.   Respiratory: Negative for hemoptysis.    Endocrine: Negative for polyphagia.   Hematologic/Lymphatic: Negative for adenopathy.   Skin: Negative for color change.   Musculoskeletal: Negative for joint swelling.   Neurological: Negative for brief paralysis.   Psychiatric/Behavioral: Negative for hallucinations.        Objective:    Physical Exam   Constitutional: She is oriented to person, place, and time. She appears well-developed and well-nourished.   HENT:   Head: Normocephalic and atraumatic.   Eyes: Conjunctivae are normal. Pupils are equal, round, and reactive to light.   Neck: Normal range of motion. Neck supple.   Cardiovascular: Normal rate, normal heart sounds and intact distal pulses.    Pulmonary/Chest: Effort  normal and breath sounds normal.   Abdominal: Soft. Bowel sounds are normal.   Musculoskeletal: Normal range of motion.   Neurological: She is alert and oriented to person, place, and time.   Skin: Skin is warm and dry.         Assessment:       1. Hypertensive heart disease with diastolic heart failure    2. Type 2 diabetes mellitus without complication, without long-term current use of insulin    3. FADIA on CPAP    4. Morbid obesity with BMI of 60.0-69.9, adult    5. Chest pain, atypical         Plan:       DSE for DAY and CP  Lasix prn for LE edema

## 2018-02-05 ENCOUNTER — PATIENT MESSAGE (OUTPATIENT)
Dept: CARDIOLOGY | Facility: CLINIC | Age: 48
End: 2018-02-05

## 2018-02-05 DIAGNOSIS — I11.0 HYPERTENSIVE HEART DISEASE WITH DIASTOLIC HEART FAILURE: ICD-10-CM

## 2018-02-05 DIAGNOSIS — I50.30 HYPERTENSIVE HEART DISEASE WITH DIASTOLIC HEART FAILURE: ICD-10-CM

## 2018-02-05 DIAGNOSIS — F41.8 DEPRESSION WITH ANXIETY: ICD-10-CM

## 2018-02-05 RX ORDER — METOPROLOL SUCCINATE 100 MG/1
100 TABLET, EXTENDED RELEASE ORAL DAILY
Qty: 30 TABLET | Refills: 3 | Status: SHIPPED | OUTPATIENT
Start: 2018-02-05 | End: 2018-06-12 | Stop reason: SDUPTHER

## 2018-02-05 RX ORDER — CITALOPRAM 40 MG/1
40 TABLET, FILM COATED ORAL DAILY
Qty: 30 TABLET | Refills: 3 | Status: SHIPPED | OUTPATIENT
Start: 2018-02-05 | End: 2018-06-12 | Stop reason: SDUPTHER

## 2018-02-26 ENCOUNTER — PATIENT MESSAGE (OUTPATIENT)
Dept: BARIATRICS | Facility: CLINIC | Age: 48
End: 2018-02-26

## 2018-02-26 ENCOUNTER — PATIENT MESSAGE (OUTPATIENT)
Dept: INTERNAL MEDICINE | Facility: CLINIC | Age: 48
End: 2018-02-26

## 2018-02-27 ENCOUNTER — HOSPITAL ENCOUNTER (OUTPATIENT)
Dept: CARDIOLOGY | Facility: HOSPITAL | Age: 48
Discharge: HOME | End: 2018-02-27
Attending: INTERNAL MEDICINE
Payer: COMMERCIAL

## 2018-02-27 DIAGNOSIS — E11.9 TYPE 2 DIABETES MELLITUS WITHOUT COMPLICATION, WITHOUT LONG-TERM CURRENT USE OF INSULIN: ICD-10-CM

## 2018-02-27 DIAGNOSIS — R07.89 CHEST PAIN, ATYPICAL: ICD-10-CM

## 2018-02-27 DIAGNOSIS — I11.0 HYPERTENSIVE HEART DISEASE WITH DIASTOLIC HEART FAILURE: ICD-10-CM

## 2018-02-27 DIAGNOSIS — G47.33 OSA ON CPAP: ICD-10-CM

## 2018-02-27 DIAGNOSIS — I50.30 HYPERTENSIVE HEART DISEASE WITH DIASTOLIC HEART FAILURE: ICD-10-CM

## 2018-02-27 DIAGNOSIS — E66.01 MORBID OBESITY WITH BMI OF 60.0-69.9, ADULT: ICD-10-CM

## 2018-02-27 LAB
DIASTOLIC DYSFUNCTION: NO
ESTIMATED PA SYSTOLIC PRESSURE: 23.79
GLOBAL PERICARDIAL EFFUSION: NORMAL
RETIRED EF AND QEF - SEE NOTES: 55 (ref 55–65)
TRICUSPID VALVE REGURGITATION: NORMAL

## 2018-02-27 PROCEDURE — 63600175 PHARM REV CODE 636 W HCPCS

## 2018-02-27 PROCEDURE — 93351 STRESS TTE COMPLETE: CPT

## 2018-02-27 PROCEDURE — 93320 DOPPLER ECHO COMPLETE: CPT | Mod: 26,,, | Performed by: INTERNAL MEDICINE

## 2018-02-27 PROCEDURE — 93325 DOPPLER ECHO COLOR FLOW MAPG: CPT | Mod: 26,,, | Performed by: INTERNAL MEDICINE

## 2018-02-27 PROCEDURE — 93351 STRESS TTE COMPLETE: CPT | Mod: 26,,, | Performed by: INTERNAL MEDICINE

## 2018-02-27 RX ORDER — DOBUTAMINE HYDROCHLORIDE 200 MG/100ML
INJECTION INTRAVENOUS
Status: DISPENSED
Start: 2018-02-27 | End: 2018-02-28

## 2018-02-27 RX ORDER — ATROPINE SULFATE 0.1 MG/ML
INJECTION INTRAVENOUS
Status: DISCONTINUED
Start: 2018-02-27 | End: 2018-02-27 | Stop reason: WASHOUT

## 2018-02-28 ENCOUNTER — OFFICE VISIT (OUTPATIENT)
Dept: CARDIOLOGY | Facility: CLINIC | Age: 48
End: 2018-02-28
Payer: COMMERCIAL

## 2018-02-28 VITALS
DIASTOLIC BLOOD PRESSURE: 91 MMHG | SYSTOLIC BLOOD PRESSURE: 173 MMHG | WEIGHT: 293 LBS | BODY MASS INDEX: 43.4 KG/M2 | HEIGHT: 69 IN | OXYGEN SATURATION: 98 % | HEART RATE: 89 BPM

## 2018-02-28 DIAGNOSIS — I11.0 HYPERTENSIVE HEART DISEASE WITH DIASTOLIC HEART FAILURE: ICD-10-CM

## 2018-02-28 DIAGNOSIS — E11.9 TYPE 2 DIABETES MELLITUS WITHOUT COMPLICATION, WITHOUT LONG-TERM CURRENT USE OF INSULIN: ICD-10-CM

## 2018-02-28 DIAGNOSIS — I50.30 HYPERTENSIVE HEART DISEASE WITH DIASTOLIC HEART FAILURE: ICD-10-CM

## 2018-02-28 DIAGNOSIS — E78.1 HYPERTRIGLYCERIDEMIA: ICD-10-CM

## 2018-02-28 DIAGNOSIS — R07.89 CHEST PAIN, ATYPICAL: Primary | ICD-10-CM

## 2018-02-28 PROCEDURE — 3008F BODY MASS INDEX DOCD: CPT | Mod: S$GLB,,, | Performed by: INTERNAL MEDICINE

## 2018-02-28 PROCEDURE — 99999 PR PBB SHADOW E&M-EST. PATIENT-LVL IV: CPT | Mod: PBBFAC,,, | Performed by: INTERNAL MEDICINE

## 2018-02-28 PROCEDURE — 99213 OFFICE O/P EST LOW 20 MIN: CPT | Mod: S$GLB,,, | Performed by: INTERNAL MEDICINE

## 2018-02-28 NOTE — PROGRESS NOTES
Subjective:    Patient ID:  Kalee Dupree is a 47 y.o. female who presents for follow-up of Results      HPI     CP, FADIA on CPAP, Obesity, HTN     DSE 2/27/18    1 - Normal left ventricular systolic function (EF 55-60%).     No evidence of stress induced myocardial ischemia.      Echo 3/11/14  1 - Low normal left ventricular systolic function (EF 50-55%).   2 - Eccentric hypertrophy.   3 - Mild left ventricular enlargement.   4 - Left atrial enlargement.   5 - Left ventricular diastolic dysfunction.   6 - Right ventricular enlargement with normal systolic function.   7 - Mild aortic regurgitation.   8 - Trivial mitral regurgitation.   9 - Trivial tricuspid regurgitation.      Records from  admission 11/14/14 show [facility]  EDP 18, EF 60%, normal coronaries  At that time peak troponin was 1.4    CP improved  LE edema improved with lasix  Now being evaluated for bariatric surgery    Review of Systems   Constitution: Negative for decreased appetite.   HENT: Negative for ear discharge.    Eyes: Negative for blurred vision.   Respiratory: Negative for hemoptysis.    Endocrine: Negative for polyphagia.   Hematologic/Lymphatic: Negative for adenopathy.   Skin: Negative for color change.   Musculoskeletal: Negative for joint swelling.   Neurological: Negative for brief paralysis.   Psychiatric/Behavioral: Negative for hallucinations.        Objective:    Physical Exam   Constitutional: She is oriented to person, place, and time. She appears well-developed and well-nourished.   HENT:   Head: Normocephalic and atraumatic.   Eyes: Conjunctivae are normal. Pupils are equal, round, and reactive to light.   Neck: Normal range of motion. Neck supple.   Cardiovascular: Normal rate, normal heart sounds and intact distal pulses.    Pulmonary/Chest: Effort normal and breath sounds normal.   Abdominal: Soft. Bowel sounds are normal.   Musculoskeletal: Normal range of motion.   Neurological: She is alert and oriented to person, place,  and time.   Skin: Skin is warm and dry.         Assessment:       1. Chest pain, atypical    2. Type 2 diabetes mellitus without complication, without long-term current use of insulin    3. Hypertensive heart disease with diastolic heart failure    4. Hypertriglyceridemia         Plan:       Cleared for bariatric surgery at low cardiac risk  OV 3 months

## 2018-03-05 DIAGNOSIS — I50.30 HYPERTENSIVE HEART DISEASE WITH DIASTOLIC HEART FAILURE: ICD-10-CM

## 2018-03-05 DIAGNOSIS — I11.0 HYPERTENSIVE HEART DISEASE WITH DIASTOLIC HEART FAILURE: ICD-10-CM

## 2018-03-05 RX ORDER — LISINOPRIL 40 MG/1
40 TABLET ORAL DAILY
Qty: 90 TABLET | Refills: 1 | Status: SHIPPED | OUTPATIENT
Start: 2018-03-05 | End: 2018-08-20

## 2018-03-15 ENCOUNTER — PATIENT MESSAGE (OUTPATIENT)
Dept: NEUROLOGY | Facility: CLINIC | Age: 48
End: 2018-03-15

## 2018-03-16 RX ORDER — GABAPENTIN 300 MG/1
CAPSULE ORAL
Qty: 90 CAPSULE | Refills: 5 | Status: SHIPPED | OUTPATIENT
Start: 2018-03-16 | End: 2018-03-17 | Stop reason: SDUPTHER

## 2018-03-18 RX ORDER — GABAPENTIN 300 MG/1
CAPSULE ORAL
Qty: 810 CAPSULE | Refills: 3 | Status: SHIPPED | OUTPATIENT
Start: 2018-03-18 | End: 2018-08-20

## 2018-04-04 DIAGNOSIS — E11.9 TYPE 2 DIABETES MELLITUS WITHOUT COMPLICATION: ICD-10-CM

## 2018-05-17 DIAGNOSIS — E78.2 MIXED HYPERLIPIDEMIA: Primary | ICD-10-CM

## 2018-05-17 RX ORDER — SIMVASTATIN 20 MG/1
20 TABLET, FILM COATED ORAL NIGHTLY
Qty: 90 TABLET | Refills: 0 | Status: SHIPPED | OUTPATIENT
Start: 2018-05-17 | End: 2018-08-20

## 2018-05-17 NOTE — TELEPHONE ENCOUNTER
----- Message from Naeem Galan sent at 5/17/2018 11:22 AM CDT -----  Contact: [place] [phone]  [facility] pharmacy is calling stating insurance company only cover 90 day supplies of simvastatin (ZOCOR) 20 MG tablet . Please advise, Thanks

## 2018-06-12 DIAGNOSIS — I50.30 HYPERTENSIVE HEART DISEASE WITH DIASTOLIC HEART FAILURE: ICD-10-CM

## 2018-06-12 DIAGNOSIS — I11.0 HYPERTENSIVE HEART DISEASE WITH DIASTOLIC HEART FAILURE: ICD-10-CM

## 2018-06-12 DIAGNOSIS — F41.8 DEPRESSION WITH ANXIETY: ICD-10-CM

## 2018-06-13 RX ORDER — CITALOPRAM 40 MG/1
40 TABLET, FILM COATED ORAL DAILY
Qty: 30 TABLET | Refills: 5 | Status: SHIPPED | OUTPATIENT
Start: 2018-06-13 | End: 2019-01-06 | Stop reason: SDUPTHER

## 2018-06-13 RX ORDER — METOPROLOL SUCCINATE 100 MG/1
100 TABLET, EXTENDED RELEASE ORAL DAILY
Qty: 30 TABLET | Refills: 5 | Status: SHIPPED | OUTPATIENT
Start: 2018-06-13 | End: 2018-08-31 | Stop reason: SDUPTHER

## 2018-06-19 DIAGNOSIS — I11.0 HYPERTENSIVE HEART DISEASE WITH DIASTOLIC HEART FAILURE: ICD-10-CM

## 2018-06-19 DIAGNOSIS — I50.30 HYPERTENSIVE HEART DISEASE WITH DIASTOLIC HEART FAILURE: ICD-10-CM

## 2018-06-20 RX ORDER — HYDROCHLOROTHIAZIDE 25 MG/1
25 TABLET ORAL DAILY
Qty: 90 TABLET | Refills: 1 | Status: SHIPPED | OUTPATIENT
Start: 2018-06-20 | End: 2019-01-24 | Stop reason: SDUPTHER

## 2018-06-21 DIAGNOSIS — E11.9 TYPE 2 DIABETES MELLITUS WITHOUT COMPLICATION: ICD-10-CM

## 2018-08-20 ENCOUNTER — LAB VISIT (OUTPATIENT)
Dept: LAB | Facility: HOSPITAL | Age: 48
End: 2018-08-20
Attending: FAMILY MEDICINE
Payer: COMMERCIAL

## 2018-08-20 ENCOUNTER — OFFICE VISIT (OUTPATIENT)
Dept: FAMILY MEDICINE | Facility: CLINIC | Age: 48
End: 2018-08-20
Payer: COMMERCIAL

## 2018-08-20 VITALS
DIASTOLIC BLOOD PRESSURE: 80 MMHG | SYSTOLIC BLOOD PRESSURE: 128 MMHG | HEIGHT: 68 IN | BODY MASS INDEX: 44.41 KG/M2 | TEMPERATURE: 98 F | RESPIRATION RATE: 16 BRPM | OXYGEN SATURATION: 97 % | HEART RATE: 64 BPM | WEIGHT: 293 LBS

## 2018-08-20 DIAGNOSIS — M79.18 ACUTE BUTTOCK PAIN: ICD-10-CM

## 2018-08-20 DIAGNOSIS — E11.9 TYPE 2 DIABETES MELLITUS WITHOUT COMPLICATION, WITHOUT LONG-TERM CURRENT USE OF INSULIN: ICD-10-CM

## 2018-08-20 DIAGNOSIS — E86.0 DEHYDRATION: Primary | ICD-10-CM

## 2018-08-20 DIAGNOSIS — E86.0 DEHYDRATION: ICD-10-CM

## 2018-08-20 DIAGNOSIS — I11.0 HYPERTENSIVE HEART DISEASE WITH DIASTOLIC HEART FAILURE: ICD-10-CM

## 2018-08-20 DIAGNOSIS — I50.30 HYPERTENSIVE HEART DISEASE WITH DIASTOLIC HEART FAILURE: ICD-10-CM

## 2018-08-20 DIAGNOSIS — R94.31 ABNORMAL EKG: ICD-10-CM

## 2018-08-20 DIAGNOSIS — R55 SYNCOPE, UNSPECIFIED SYNCOPE TYPE: ICD-10-CM

## 2018-08-20 LAB
ALBUMIN SERPL BCP-MCNC: 3.4 G/DL
ALP SERPL-CCNC: 74 U/L
ALT SERPL W/O P-5'-P-CCNC: 25 U/L
ANION GAP SERPL CALC-SCNC: 10 MMOL/L
AST SERPL-CCNC: 22 U/L
BASOPHILS # BLD AUTO: 0.09 K/UL
BASOPHILS NFR BLD: 0.7 %
BILIRUB SERPL-MCNC: 0.7 MG/DL
BUN SERPL-MCNC: 15 MG/DL
CALCIUM SERPL-MCNC: 9.6 MG/DL
CHLORIDE SERPL-SCNC: 106 MMOL/L
CHOLEST SERPL-MCNC: 183 MG/DL
CHOLEST/HDLC SERPL: 5.2 {RATIO}
CO2 SERPL-SCNC: 26 MMOL/L
CREAT SERPL-MCNC: 1.1 MG/DL
DIFFERENTIAL METHOD: ABNORMAL
EOSINOPHIL # BLD AUTO: 0.2 K/UL
EOSINOPHIL NFR BLD: 1.6 %
ERYTHROCYTE [DISTWIDTH] IN BLOOD BY AUTOMATED COUNT: 15.2 %
EST. GFR  (AFRICAN AMERICAN): >60 ML/MIN/1.73 M^2
EST. GFR  (NON AFRICAN AMERICAN): 59.9 ML/MIN/1.73 M^2
ESTIMATED AVG GLUCOSE: 108 MG/DL
GLUCOSE SERPL-MCNC: 111 MG/DL
HBA1C MFR BLD HPLC: 5.4 %
HCT VFR BLD AUTO: 38.4 %
HDLC SERPL-MCNC: 35 MG/DL
HDLC SERPL: 19.1 %
HGB BLD-MCNC: 12.1 G/DL
IMM GRANULOCYTES # BLD AUTO: 0.05 K/UL
IMM GRANULOCYTES NFR BLD AUTO: 0.4 %
LDLC SERPL CALC-MCNC: 84.4 MG/DL
LYMPHOCYTES # BLD AUTO: 2.7 K/UL
LYMPHOCYTES NFR BLD: 21.5 %
MCH RBC QN AUTO: 28.1 PG
MCHC RBC AUTO-ENTMCNC: 31.5 G/DL
MCV RBC AUTO: 89 FL
MONOCYTES # BLD AUTO: 0.8 K/UL
MONOCYTES NFR BLD: 6.4 %
NEUTROPHILS # BLD AUTO: 8.7 K/UL
NEUTROPHILS NFR BLD: 69.4 %
NONHDLC SERPL-MCNC: 148 MG/DL
NRBC BLD-RTO: 0 /100 WBC
PLATELET # BLD AUTO: 300 K/UL
PMV BLD AUTO: 11 FL
POTASSIUM SERPL-SCNC: 3.4 MMOL/L
PROT SERPL-MCNC: 7.3 G/DL
RBC # BLD AUTO: 4.31 M/UL
SODIUM SERPL-SCNC: 142 MMOL/L
TRIGL SERPL-MCNC: 318 MG/DL
WBC # BLD AUTO: 12.6 K/UL

## 2018-08-20 PROCEDURE — 93005 ELECTROCARDIOGRAM TRACING: CPT | Mod: S$GLB,,, | Performed by: FAMILY MEDICINE

## 2018-08-20 PROCEDURE — 80053 COMPREHEN METABOLIC PANEL: CPT

## 2018-08-20 PROCEDURE — 36415 COLL VENOUS BLD VENIPUNCTURE: CPT | Mod: PO

## 2018-08-20 PROCEDURE — 83036 HEMOGLOBIN GLYCOSYLATED A1C: CPT

## 2018-08-20 PROCEDURE — 3045F PR MOST RECENT HEMOGLOBIN A1C LEVEL 7.0-9.0%: CPT | Mod: CPTII,S$GLB,, | Performed by: FAMILY MEDICINE

## 2018-08-20 PROCEDURE — 93010 ELECTROCARDIOGRAM REPORT: CPT | Mod: S$GLB,,, | Performed by: INTERNAL MEDICINE

## 2018-08-20 PROCEDURE — 99214 OFFICE O/P EST MOD 30 MIN: CPT | Mod: S$GLB,,, | Performed by: FAMILY MEDICINE

## 2018-08-20 PROCEDURE — 3079F DIAST BP 80-89 MM HG: CPT | Mod: CPTII,S$GLB,, | Performed by: FAMILY MEDICINE

## 2018-08-20 PROCEDURE — 80061 LIPID PANEL: CPT

## 2018-08-20 PROCEDURE — 3008F BODY MASS INDEX DOCD: CPT | Mod: CPTII,S$GLB,, | Performed by: FAMILY MEDICINE

## 2018-08-20 PROCEDURE — 3074F SYST BP LT 130 MM HG: CPT | Mod: CPTII,S$GLB,, | Performed by: FAMILY MEDICINE

## 2018-08-20 PROCEDURE — 99999 PR PBB SHADOW E&M-EST. PATIENT-LVL IV: CPT | Mod: PBBFAC,,, | Performed by: FAMILY MEDICINE

## 2018-08-20 PROCEDURE — 85025 COMPLETE CBC W/AUTO DIFF WBC: CPT

## 2018-08-20 NOTE — PROGRESS NOTES
IV started x 1 attempt to right AC using 20G needle.   Patient tolerated procedure well.  No infiltration or phlebitis noted.

## 2018-08-20 NOTE — PROGRESS NOTES
Chief Complaint   Patient presents with    Dizziness    Nausea    Headache       HPI  Kalee Dupree is a 47 y.o. female with multiple medical diagnoses as listed in the medical history and problem list that presents for evaluation for dizziness and passing out which occurred Friday night. S/p gastric bypass 2018 by Dr. Vargas. Has passed out before 7 years ago due to her iron levels. She takes in 600 calories daily. Has some intermittent vomiting. She was standing at the counter of a store when she started to feel light headed and nauseous, she was speaking to the woman at the counter and she heard her voice change and she felt like she was going to faint. She has a hx of tachycardia and does not recall palpitations. She was down for several seconds. She landed on her buttocks. She has not hit her head but has been having a headache. She is having dizziness. She has been drinking four 16 oz bottles, Saturday she had four bites of shrimp and grits and yesterday she had redfish and grits 7 or 8 bites.     PAST MEDICAL HISTORY:  Past Medical History:   Diagnosis Date    Allergy     Anemia     Anxiety     Depression     Diabetes mellitus, type 2     Focal segmental glomerulosclerosis     Hyperlipidemia     Hypertension     Lumbar disc disease     Morbid obesity     FADIA on CPAP     Proteinuria     Retinal artery occlusion        PAST SURGICAL HISTORY:  Past Surgical History:   Procedure Laterality Date     SECTION       SECTION      CHOLECYSTECTOMY      DILATION AND CURETTAGE OF UTERUS      EYE SURGERY         SOCIAL HISTORY:  Social History     Socioeconomic History    Marital status:      Spouse name: Not on file    Number of children: 1    Years of education: Not on file    Highest education level: Not on file   Social Needs    Financial resource strain: Not on file    Food insecurity - worry: Not on file    Food insecurity - inability: Not on file     Transportation needs - medical: Not on file    Transportation needs - non-medical: Not on file   Occupational History     Employer: [employer]   Tobacco Use    Smoking status: Former Smoker     Packs/day: 0.50     Years: 7.00     Pack years: 3.50    Tobacco comment: Quit 1993.   Substance and Sexual Activity    Alcohol use: Yes     Comment: Rare.     Drug use: No    Sexual activity: Yes     Partners: Male   Other Topics Concern    Not on file   Social History Narrative    Several family members on mother side with aneurysms heart and aorta       FAMILY HISTORY:  Family History   Problem Relation Age of Onset    Cancer Mother 56        colon    Diabetes Mother     Hyperlipidemia Mother     Hypertension Mother     Obesity Mother     Kidney disease Mother     Thyroid disease Mother     Colon cancer Mother     Cancer Father         lung    Hypertension Father     Diabetes Brother     Hypertension Brother     Heart disease Paternal Grandfather        ALLERGIES AND MEDICATIONS: updated and reviewed.  Review of patient's allergies indicates:   Allergen Reactions    Amlodipine      Other reaction(s): Unknown    Azithromycin      Stomach pain, and cramping    Benazepril      Other reaction(s): Unknown    Hydrocodone      Other reaction(s): Unknown    Lotrel  [amlodipine-benazepril] Itching    Vicodin  [hydrocodone-acetaminophen] Hives and Itching     Current Outpatient Medications   Medication Sig Dispense Refill    amitriptyline (ELAVIL) 10 MG tablet TAKE 1 TABLET (10 MG TOTAL) BY MOUTH EVERY EVENING. 30 tablet 8    citalopram (CELEXA) 40 MG tablet TAKE 1 TABLET (40 MG TOTAL) BY MOUTH ONCE DAILY. 30 tablet 5    hydroCHLOROthiazide (HYDRODIURIL) 25 MG tablet TAKE 1 TABLET (25 MG TOTAL) BY MOUTH ONCE DAILY. 90 tablet 1    metoprolol succinate (TOPROL-XL) 100 MG 24 hr tablet TAKE 1 TABLET (100 MG TOTAL) BY MOUTH ONCE DAILY. 30 tablet 5    blood sugar diagnostic Strp 1 strip by  "Misc.(Non-Drug; Combo Route) route once daily. 100 strip 3    fluticasone (FLONASE) 50 mcg/actuation nasal spray 1 spray by Each Nare route once daily. 16 g 1    FREESTYLE LANCETS 28 gauge lancets 1 LANCET BY MISC.(NON-DRUG COMBO ROUTE) ROUTE ONCE DAILY.  3    lancets Misc 1 lancet by Misc.(Non-Drug; Combo Route) route once daily. 100 each 3    tolterodine (DETROL LA) 4 MG 24 hr capsule Take 1 capsule (4 mg total) by mouth once daily. 90 capsule 0     Current Facility-Administered Medications   Medication Dose Route Frequency Provider Last Rate Last Dose    sodium chloride 0.9% bolus 1,000 mL  1,000 mL Intravenous Once Arlette Chavez MD           ROS  Review of Systems   Constitutional: Positive for fatigue. Negative for chills, diaphoresis, fever and unexpected weight change.   HENT: Negative for rhinorrhea, sinus pressure, sore throat and tinnitus.    Eyes: Negative for photophobia and visual disturbance.   Respiratory: Negative for cough, shortness of breath and wheezing.    Cardiovascular: Negative for chest pain and palpitations.   Gastrointestinal: Negative for abdominal pain, blood in stool, constipation, diarrhea, nausea and vomiting.   Genitourinary: Negative for dysuria, flank pain, frequency and vaginal discharge.   Musculoskeletal: Positive for arthralgias. Negative for joint swelling.   Skin: Negative for rash.   Neurological: Positive for weakness and light-headedness. Negative for speech difficulty and headaches.   Psychiatric/Behavioral: Negative for behavioral problems and dysphoric mood.       Physical Exam  Vitals:    08/20/18 0742   BP: 128/80   Pulse: 64   Resp: 16   Temp: 98.1 °F (36.7 °C)   TempSrc: Oral   SpO2: 97%   Weight: (!) 146.1 kg (322 lb)   Height: 5' 8" (1.727 m)    Body mass index is 48.96 kg/m².  Weight: (!) 146.1 kg (322 lb)   Height: 5' 8" (172.7 cm)     Physical Exam   Constitutional: She is oriented to person, place, and time. She appears well-developed. No distress. "   Some skin tenting   HENT:   Head: Normocephalic and atraumatic.   Eyes: EOM are normal.   Neck: Neck supple.   Cardiovascular: Normal rate and regular rhythm. Exam reveals no gallop and no friction rub.   No murmur heard.  Pulmonary/Chest: Effort normal and breath sounds normal. No stridor. No respiratory distress. She has no wheezes. She has no rales.   Musculoskeletal:   Right buttock pain under buttocks with deep palpation   Lymphadenopathy:     She has no cervical adenopathy.   Neurological: She is alert and oriented to person, place, and time.   Skin: Skin is warm and dry. No rash noted.   Psychiatric: She has a normal mood and affect. Her behavior is normal.   Nursing note and vitals reviewed.      Health Maintenance       Date Due Completion Date    Pneumococcal PPSV23 (Medium Risk) (1) 11/24/1988 ---    Pap Smear with HPV Cotest 11/24/1991 ---    Eye Exam 08/02/2017 8/2/2016    Hemoglobin A1c 03/18/2018 9/18/2017    Foot Exam 06/16/2018 6/16/2017    Lipid Panel 06/21/2018 6/21/2017    Influenza Vaccine 08/01/2018 9/18/2017    Mammogram 06/21/2018 6/21/2017    Low Dose Statin 05/17/2019 5/17/2018    TETANUS VACCINE 06/17/2024 6/17/2014            ASSESSMENT     1. Dehydration    2. Syncope, unspecified syncope type    3. Hypertensive heart disease with diastolic heart failure    4. Type 2 diabetes mellitus without complication, without long-term current use of insulin    5. Acute buttock pain    6. Abnormal EKG        PLAN:     Problem List Items Addressed This Visit        Cardiac/Vascular    Hypertensive heart disease with diastolic heart failure    Relevant Orders    CBC auto differential       Endocrine    Type 2 diabetes mellitus without complication, without long-term current use of insulin    Relevant Medications    sodium chloride 0.9% bolus 1,000 mL (Start on 8/20/2018  9:45 AM)    Other Relevant Orders    Hemoglobin A1c    Lipid panel      Other Visit Diagnoses     Dehydration    -  Primary  -s/p  IV fluid 1L  -discussed that she needs to orally hydrate, include powerade zero  -needs to increase protein, avoid carbs as her oral intake is limited    Relevant Orders    Comprehensive metabolic panel    EKG 12-lead    Syncope, unspecified syncope type      -she needs to follow up with Dr. Mirza as her EKG is abnormal  On beta blocker    Acute buttock pain      -recommend ice, should heal over the next few weeks    Abnormal EKG      -new lst degree AV block            Arlette Chavez MD  08/20/2018 8:15 AM        Follow-up in about 2 weeks (around 9/3/2018) for Follow up.

## 2018-08-23 ENCOUNTER — PATIENT MESSAGE (OUTPATIENT)
Dept: CARDIOLOGY | Facility: CLINIC | Age: 48
End: 2018-08-23

## 2018-08-24 DIAGNOSIS — E11.9 TYPE 2 DIABETES MELLITUS WITHOUT COMPLICATION, UNSPECIFIED WHETHER LONG TERM INSULIN USE: ICD-10-CM

## 2018-08-31 ENCOUNTER — OFFICE VISIT (OUTPATIENT)
Dept: CARDIOLOGY | Facility: CLINIC | Age: 48
End: 2018-08-31
Payer: COMMERCIAL

## 2018-08-31 ENCOUNTER — PATIENT MESSAGE (OUTPATIENT)
Dept: FAMILY MEDICINE | Facility: CLINIC | Age: 48
End: 2018-08-31

## 2018-08-31 VITALS
SYSTOLIC BLOOD PRESSURE: 135 MMHG | BODY MASS INDEX: 43.4 KG/M2 | HEART RATE: 62 BPM | OXYGEN SATURATION: 95 % | HEIGHT: 69 IN | DIASTOLIC BLOOD PRESSURE: 72 MMHG | WEIGHT: 293 LBS

## 2018-08-31 DIAGNOSIS — I50.30 HYPERTENSIVE HEART DISEASE WITH DIASTOLIC HEART FAILURE: ICD-10-CM

## 2018-08-31 DIAGNOSIS — I11.0 HYPERTENSIVE HEART DISEASE WITH DIASTOLIC HEART FAILURE: ICD-10-CM

## 2018-08-31 DIAGNOSIS — R07.89 CHEST PAIN, ATYPICAL: Primary | ICD-10-CM

## 2018-08-31 DIAGNOSIS — E66.01 MORBID OBESITY WITH BMI OF 60.0-69.9, ADULT: ICD-10-CM

## 2018-08-31 PROCEDURE — 99214 OFFICE O/P EST MOD 30 MIN: CPT | Mod: S$GLB,,, | Performed by: INTERNAL MEDICINE

## 2018-08-31 PROCEDURE — 3008F BODY MASS INDEX DOCD: CPT | Mod: CPTII,S$GLB,, | Performed by: INTERNAL MEDICINE

## 2018-08-31 PROCEDURE — 99999 PR PBB SHADOW E&M-EST. PATIENT-LVL III: CPT | Mod: PBBFAC,,, | Performed by: INTERNAL MEDICINE

## 2018-08-31 PROCEDURE — 3075F SYST BP GE 130 - 139MM HG: CPT | Mod: CPTII,S$GLB,, | Performed by: INTERNAL MEDICINE

## 2018-08-31 PROCEDURE — 3078F DIAST BP <80 MM HG: CPT | Mod: CPTII,S$GLB,, | Performed by: INTERNAL MEDICINE

## 2018-08-31 RX ORDER — DOXYCYCLINE HYCLATE 100 MG
100 TABLET ORAL 2 TIMES DAILY
Qty: 14 TABLET | Refills: 0 | Status: SHIPPED | OUTPATIENT
Start: 2018-08-31 | End: 2019-01-24

## 2018-08-31 RX ORDER — METOPROLOL SUCCINATE 50 MG/1
50 TABLET, EXTENDED RELEASE ORAL DAILY
Qty: 90 TABLET | Refills: 3 | Status: SHIPPED | OUTPATIENT
Start: 2018-08-31 | End: 2019-11-05 | Stop reason: SDUPTHER

## 2018-08-31 NOTE — PROGRESS NOTES
Subjective:    Patient ID:  Kalee Dupree is a 47 y.o. female who presents for follow-up of Loss of Consciousness      HPI     CP, FADIA on CPAP, Obesity, HTN, s/p bariatric surgery 3/2018     DSE 2/27/18    1 - Normal left ventricular systolic function (EF 55-60%).     No evidence of stress induced myocardial ischemia.      Echo 3/11/14  1 - Low normal left ventricular systolic function (EF 50-55%).   2 - Eccentric hypertrophy.   3 - Mild left ventricular enlargement.   4 - Left atrial enlargement.   5 - Left ventricular diastolic dysfunction.   6 - Right ventricular enlargement with normal systolic function.   7 - Mild aortic regurgitation.   8 - Trivial mitral regurgitation.   9 - Trivial tricuspid regurgitation.       Records from  admission 11/14/14 show C  EDP 18, EF 60%, normal coronaries  At that time peak troponin was 1.4    Saw Dr miguel 8/20/18  Kalee Dupree is a 47 y.o. female with multiple medical diagnoses as listed in the medical history and problem list that presents for evaluation for dizziness and passing out which occurred Friday night. S/p gastric bypass March 2018 by Dr. Vargas. Has passed out before 7 years ago due to her iron levels. She takes in 600 calories daily. Has some intermittent vomiting. She was standing at the counter of a store when she started to feel light headed and nauseous, she was speaking to the woman at the counter and she heard her voice change and she felt like she was going to faint. She has a hx of tachycardia and does not recall palpitations. She was down for several seconds. She landed on her buttocks. She has not hit her head but has been having a headache. She is having dizziness. She has been drinking four 16 oz bottles, Saturday she had four bites of shrimp and grits and yesterday she had redfish and grits 7 or 8 bites.     EKG 8/20/18 NST 1st degree AVB otherwise ok. 1st degree AVB new from EKG 1/15/18  Suffering with an URI        Review of Systems    Constitution: Negative for decreased appetite.   HENT: Negative for ear discharge.    Eyes: Negative for blurred vision.   Respiratory: Negative for hemoptysis.    Endocrine: Negative for polyphagia.   Hematologic/Lymphatic: Negative for adenopathy.   Skin: Negative for color change.   Musculoskeletal: Negative for joint swelling.   Neurological: Negative for brief paralysis.   Psychiatric/Behavioral: Negative for hallucinations.        Objective:    Physical Exam   Constitutional: She is oriented to person, place, and time. She appears well-developed and well-nourished.   HENT:   Head: Normocephalic and atraumatic.   Eyes: Conjunctivae are normal. Pupils are equal, round, and reactive to light.   Neck: Normal range of motion. Neck supple.   Cardiovascular: Normal rate, normal heart sounds and intact distal pulses.   Pulmonary/Chest: Effort normal and breath sounds normal.   Abdominal: Soft. Bowel sounds are normal.   Musculoskeletal: Normal range of motion.   Neurological: She is alert and oriented to person, place, and time.   Skin: Skin is warm and dry.         Assessment:       1. Chest pain, atypical    2. Morbid obesity with BMI of 60.0-69.9, adult    3. Hypertensive heart disease with diastolic heart failure         Plan:       Decrease toprol XL 50 qd  OV 1 month to reassess dizziness/syncope  Doxycycline for URI

## 2018-09-05 ENCOUNTER — OFFICE VISIT (OUTPATIENT)
Dept: FAMILY MEDICINE | Facility: CLINIC | Age: 48
End: 2018-09-05
Payer: COMMERCIAL

## 2018-09-05 VITALS
BODY MASS INDEX: 43.4 KG/M2 | SYSTOLIC BLOOD PRESSURE: 136 MMHG | OXYGEN SATURATION: 97 % | TEMPERATURE: 98 F | HEART RATE: 77 BPM | HEIGHT: 69 IN | RESPIRATION RATE: 20 BRPM | DIASTOLIC BLOOD PRESSURE: 88 MMHG | WEIGHT: 293 LBS

## 2018-09-05 DIAGNOSIS — J40 BRONCHITIS: ICD-10-CM

## 2018-09-05 DIAGNOSIS — I11.0 HYPERTENSIVE HEART DISEASE WITH DIASTOLIC HEART FAILURE: ICD-10-CM

## 2018-09-05 DIAGNOSIS — E11.9 TYPE 2 DIABETES MELLITUS WITHOUT COMPLICATION, WITHOUT LONG-TERM CURRENT USE OF INSULIN: Primary | ICD-10-CM

## 2018-09-05 DIAGNOSIS — E66.01 MORBID OBESITY WITH BMI OF 45.0-49.9, ADULT: ICD-10-CM

## 2018-09-05 DIAGNOSIS — M47.26 OSTEOARTHRITIS OF SPINE WITH RADICULOPATHY, LUMBAR REGION: ICD-10-CM

## 2018-09-05 DIAGNOSIS — Z12.31 ENCOUNTER FOR SCREENING MAMMOGRAM FOR BREAST CANCER: ICD-10-CM

## 2018-09-05 DIAGNOSIS — I50.30 HYPERTENSIVE HEART DISEASE WITH DIASTOLIC HEART FAILURE: ICD-10-CM

## 2018-09-05 PROCEDURE — 3008F BODY MASS INDEX DOCD: CPT | Mod: CPTII,S$GLB,, | Performed by: FAMILY MEDICINE

## 2018-09-05 PROCEDURE — 3044F HG A1C LEVEL LT 7.0%: CPT | Mod: CPTII,S$GLB,, | Performed by: FAMILY MEDICINE

## 2018-09-05 PROCEDURE — 99999 PR PBB SHADOW E&M-EST. PATIENT-LVL V: CPT | Mod: PBBFAC,,, | Performed by: FAMILY MEDICINE

## 2018-09-05 PROCEDURE — 3075F SYST BP GE 130 - 139MM HG: CPT | Mod: CPTII,S$GLB,, | Performed by: FAMILY MEDICINE

## 2018-09-05 PROCEDURE — 3079F DIAST BP 80-89 MM HG: CPT | Mod: CPTII,S$GLB,, | Performed by: FAMILY MEDICINE

## 2018-09-05 PROCEDURE — 99214 OFFICE O/P EST MOD 30 MIN: CPT | Mod: S$GLB,,, | Performed by: FAMILY MEDICINE

## 2018-09-05 RX ORDER — DOXYCYCLINE 100 MG/1
100 CAPSULE ORAL EVERY 12 HOURS
Qty: 20 CAPSULE | Refills: 0 | Status: SHIPPED | OUTPATIENT
Start: 2018-09-05 | End: 2018-09-15

## 2018-09-05 RX ORDER — ALBUTEROL SULFATE 90 UG/1
2 AEROSOL, METERED RESPIRATORY (INHALATION) EVERY 6 HOURS PRN
Qty: 18 G | Refills: 0 | Status: SHIPPED | OUTPATIENT
Start: 2018-09-05 | End: 2019-09-05

## 2018-09-05 NOTE — LETTER
September 5, 2018      [facility]  [address] [facility] [facility]  [place] [address]  Phone: [phone]  Fax: [phone]       Patient: Kalee Dupree   YOB: 1970  Date of Visit: 09/05/2018    To Whom It May Concern:    Laurie Dupree  was at [facility] on 09/05/2018. She has a health condition that is exacerbated by climbing stairs and it is recommended this be avoided. If you have any questions or concerns, or if I can be of further assistance, please do not hesitate to contact me.    Sincerely,          Arlette Chavez MD

## 2018-09-05 NOTE — PROGRESS NOTES
Chief Complaint   Patient presents with    Dehydration    Cough    Low-back Pain    Follow-up       HPI  Kalee Dupree is a 47 y.o. female with multiple medical diagnoses as listed in the medical history and problem list that presents for follow-up for type 2 diabetes and concerns about a cough.    Cough- this happened several weeks ago. She also had a rattle on her chest. She has had wheezing at night and at work. She is coughing up thick yellow green. She had night sweats a few nights ago. She was given doxy by Dr. iMrza but her cough has not resolved.     Diabetes- she has not been checking her sugars but has been compliant with her diet.    Back pain- this is a chronic condition for which she was treated in the past with narcotic medication and neurontin. She states this is made worse by her being asked to climb stairs at work to sign in and out. She would like a note to avoid doing this. Has a handicapped plate from her cardiologist    PAST MEDICAL HISTORY:  Past Medical History:   Diagnosis Date    Allergy     Anemia     Anxiety     Depression     Diabetes mellitus, type 2     Focal segmental glomerulosclerosis     Hyperlipidemia     Hypertension     Lumbar disc disease     Morbid obesity     FADIA on CPAP     Proteinuria     Retinal artery occlusion        PAST SURGICAL HISTORY:  Past Surgical History:   Procedure Laterality Date     SECTION       SECTION      CHOLECYSTECTOMY      DILATION AND CURETTAGE OF UTERUS      EYE SURGERY         SOCIAL HISTORY:  Social History     Socioeconomic History    Marital status:      Spouse name: Not on file    Number of children: 1    Years of education: Not on file    Highest education level: Not on file   Social Needs    Financial resource strain: Not on file    Food insecurity - worry: Not on file    Food insecurity - inability: Not on file    Transportation needs - medical: Not on file    Transportation needs -  non-medical: Not on file   Occupational History     Employer: [employer]   Tobacco Use    Smoking status: Former Smoker     Packs/day: 0.50     Years: 7.00     Pack years: 3.50    Smokeless tobacco: Never Used    Tobacco comment: Quit 1993.   Substance and Sexual Activity    Alcohol use: Yes     Comment: Rare.     Drug use: No    Sexual activity: Yes     Partners: Male   Other Topics Concern    Not on file   Social History Narrative    Several family members on mother side with aneurysms heart and aorta       FAMILY HISTORY:  Family History   Problem Relation Age of Onset    Cancer Mother 56        colon    Diabetes Mother     Hyperlipidemia Mother     Hypertension Mother     Obesity Mother     Kidney disease Mother     Thyroid disease Mother     Colon cancer Mother     Cancer Father         lung    Hypertension Father     Diabetes Brother     Hypertension Brother     Heart disease Paternal Grandfather        ALLERGIES AND MEDICATIONS: updated and reviewed.  Review of patient's allergies indicates:   Allergen Reactions    Amlodipine      Other reaction(s): Unknown    Azithromycin      Stomach pain, and cramping    Benazepril      Other reaction(s): Unknown    Hydrocodone      Other reaction(s): Unknown    Lotrel  [amlodipine-benazepril] Itching    Vicodin  [hydrocodone-acetaminophen] Hives and Itching     Current Outpatient Medications   Medication Sig Dispense Refill    amitriptyline (ELAVIL) 10 MG tablet TAKE 1 TABLET (10 MG TOTAL) BY MOUTH EVERY EVENING. 30 tablet 8    blood sugar diagnostic Strp 1 strip by Misc.(Non-Drug; Combo Route) route once daily. 100 strip 3    citalopram (CELEXA) 40 MG tablet TAKE 1 TABLET (40 MG TOTAL) BY MOUTH ONCE DAILY. 30 tablet 5    doxycycline (VIBRA-TABS) 100 MG tablet Take 1 tablet (100 mg total) by mouth 2 (two) times daily. 14 tablet 0    FREESTYLE LANCETS 28 gauge lancets 1 LANCET BY MISC.(NON-DRUG COMBO ROUTE) ROUTE ONCE DAILY.  3     hydroCHLOROthiazide (HYDRODIURIL) 25 MG tablet TAKE 1 TABLET (25 MG TOTAL) BY MOUTH ONCE DAILY. 90 tablet 1    lancets Misc 1 lancet by Misc.(Non-Drug; Combo Route) route once daily. 100 each 3    metoprolol succinate (TOPROL-XL) 50 MG 24 hr tablet Take 1 tablet (50 mg total) by mouth once daily. 90 tablet 3    albuterol 90 mcg/actuation inhaler Inhale 2 puffs into the lungs every 6 (six) hours as needed for Wheezing. Rescue 18 g 0    doxycycline (VIBRAMYCIN) 100 MG Cap Take 1 capsule (100 mg total) by mouth every 12 (twelve) hours. for 10 days 20 capsule 0    fluticasone (FLONASE) 50 mcg/actuation nasal spray 1 spray by Each Nare route once daily. 16 g 1    tolterodine (DETROL LA) 4 MG 24 hr capsule Take 1 capsule (4 mg total) by mouth once daily. 90 capsule 0     Current Facility-Administered Medications   Medication Dose Route Frequency Provider Last Rate Last Dose    sodium chloride 0.9% bolus 1,000 mL  1,000 mL Intravenous Once Arlette Chavez MD           ROS  Review of Systems   Constitutional: Negative for chills, diaphoresis, fatigue, fever and unexpected weight change.   HENT: Negative for rhinorrhea, sinus pressure, sore throat and tinnitus.    Eyes: Negative for photophobia and visual disturbance.   Respiratory: Positive for cough and wheezing. Negative for shortness of breath.    Cardiovascular: Negative for chest pain and palpitations.   Gastrointestinal: Negative for abdominal pain, blood in stool, constipation, diarrhea, nausea and vomiting.   Genitourinary: Negative for dysuria, flank pain, frequency and vaginal discharge.   Musculoskeletal: Positive for back pain. Negative for arthralgias and joint swelling.   Skin: Negative for rash.   Neurological: Negative for speech difficulty, weakness, light-headedness and headaches.   Psychiatric/Behavioral: Negative for behavioral problems and dysphoric mood.       Physical Exam  Vitals:    09/05/18 0746   BP: 136/88   Pulse: 77   Resp: 20   Temp:  "98.2 °F (36.8 °C)   TempSrc: Oral   SpO2: 97%   Weight: (!) 142.4 kg (314 lb)   Height: 5' 9" (1.753 m)    Body mass index is 46.37 kg/m².  Weight: (!) 142.4 kg (314 lb)   Height: 5' 9" (175.3 cm)     Physical Exam   Constitutional: She is oriented to person, place, and time. She appears well-developed and well-nourished. No distress.   HENT:   Head: Normocephalic and atraumatic.   Eyes: EOM are normal.   Neck: Neck supple.   Cardiovascular: Normal rate and regular rhythm. Exam reveals no gallop and no friction rub.   No murmur heard.  Pulmonary/Chest: Effort normal. No respiratory distress. She has no wheezes. She has no rales.   Chest congestion present on inspiratory exam   Lymphadenopathy:     She has no cervical adenopathy.   Neurological: She is alert and oriented to person, place, and time.   Skin: Skin is warm and dry. No rash noted. There is erythema.   Right sided bruising present   Psychiatric: She has a normal mood and affect. Her behavior is normal.   Nursing note and vitals reviewed.      Health Maintenance       Date Due Completion Date    Pneumococcal PPSV23 (Medium Risk) (1) 11/24/1988 ---    Pap Smear with HPV Cotest 11/24/1991 ---    Eye Exam 08/02/2017 8/2/2016    Foot Exam 06/16/2018 6/16/2017    Urine Microalbumin 06/16/2018 6/16/2017    Mammogram 06/21/2018 6/21/2017    Influenza Vaccine 08/01/2018 9/18/2017    Hemoglobin A1c 02/20/2019 8/20/2018    Lipid Panel 08/20/2019 8/20/2018    TETANUS VACCINE 06/17/2024 6/17/2014            ASSESSMENT     1. Type 2 diabetes mellitus without complication, without long-term current use of insulin    2. Hypertensive heart disease with diastolic heart failure    3. Morbid obesity with BMI of 45.0-49.9, adult    4. Osteoarthritis of spine with radiculopathy, lumbar region    5. Bronchitis    6. Encounter for screening mammogram for breast cancer        PLAN:     Problem List Items Addressed This Visit        Neuro    Osteoarthritis of spine with " radiculopathy, lumbar region    Current Assessment & Plan     Will write letter as we do not want to worsen her back pain so she can continue losing weight and exercising            Cardiac/Vascular    Hypertensive heart disease with diastolic heart failure    Current Assessment & Plan     S/p BB decrease as she was having a syncopal episode, she having stable blood pressure at this time, continue follow up with cardiology            Endocrine    Type 2 diabetes mellitus without complication, without long-term current use of insulin - Primary    Current Assessment & Plan     Lab Results   Component Value Date    HGBA1C 5.4 08/20/2018     improving         Morbid obesity with BMI of 45.0-49.9, adult    Current Assessment & Plan     Continue weight loss, s/p gastric bypass           Other Visit Diagnoses     Bronchitis      -will refill medication, add inhaler    Relevant Medications    doxycycline (VIBRAMYCIN) 100 MG Cap    albuterol 90 mcg/actuation inhaler    Encounter for screening mammogram for breast cancer        Relevant Orders    Mammo Digital Screening Bilat with CAD            Arlette Chavez MD  09/05/2018 8:10 AM        Follow-up in about 3 months (around 12/5/2018) for Follow up.

## 2018-09-05 NOTE — ASSESSMENT & PLAN NOTE
S/p BB decrease as she was having a syncopal episode, she having stable blood pressure at this time, continue follow up with cardiology

## 2018-09-05 NOTE — ASSESSMENT & PLAN NOTE
Will write letter as we do not want to worsen her back pain so she can continue losing weight and exercising

## 2018-09-14 DIAGNOSIS — E11.9 TYPE 2 DIABETES MELLITUS WITHOUT COMPLICATION: ICD-10-CM

## 2018-11-12 ENCOUNTER — PATIENT OUTREACH (OUTPATIENT)
Dept: ADMINISTRATIVE | Facility: HOSPITAL | Age: 48
End: 2018-11-12

## 2019-01-06 DIAGNOSIS — F41.8 DEPRESSION WITH ANXIETY: ICD-10-CM

## 2019-01-06 RX ORDER — CITALOPRAM 40 MG/1
40 TABLET, FILM COATED ORAL DAILY
Qty: 30 TABLET | Refills: 0 | Status: SHIPPED | OUTPATIENT
Start: 2019-01-06 | End: 2019-01-24

## 2019-01-24 ENCOUNTER — LAB VISIT (OUTPATIENT)
Dept: LAB | Facility: HOSPITAL | Age: 49
End: 2019-01-24
Attending: FAMILY MEDICINE
Payer: COMMERCIAL

## 2019-01-24 ENCOUNTER — OFFICE VISIT (OUTPATIENT)
Dept: FAMILY MEDICINE | Facility: CLINIC | Age: 49
End: 2019-01-24
Payer: COMMERCIAL

## 2019-01-24 VITALS
HEART RATE: 70 BPM | HEIGHT: 69 IN | DIASTOLIC BLOOD PRESSURE: 78 MMHG | WEIGHT: 290.38 LBS | BODY MASS INDEX: 43.01 KG/M2 | TEMPERATURE: 98 F | OXYGEN SATURATION: 97 % | RESPIRATION RATE: 18 BRPM | SYSTOLIC BLOOD PRESSURE: 124 MMHG

## 2019-01-24 DIAGNOSIS — I11.0 HYPERTENSIVE HEART DISEASE WITH DIASTOLIC HEART FAILURE: ICD-10-CM

## 2019-01-24 DIAGNOSIS — E11.9 TYPE 2 DIABETES MELLITUS WITHOUT COMPLICATION, WITHOUT LONG-TERM CURRENT USE OF INSULIN: Primary | ICD-10-CM

## 2019-01-24 DIAGNOSIS — Z12.31 ENCOUNTER FOR SCREENING MAMMOGRAM FOR BREAST CANCER: ICD-10-CM

## 2019-01-24 DIAGNOSIS — E11.9 TYPE 2 DIABETES MELLITUS WITHOUT COMPLICATION, WITHOUT LONG-TERM CURRENT USE OF INSULIN: ICD-10-CM

## 2019-01-24 DIAGNOSIS — I50.30 HYPERTENSIVE HEART DISEASE WITH DIASTOLIC HEART FAILURE: ICD-10-CM

## 2019-01-24 DIAGNOSIS — M21.612 BUNION OF LEFT FOOT: ICD-10-CM

## 2019-01-24 DIAGNOSIS — E66.01 MORBID OBESITY WITH BMI OF 45.0-49.9, ADULT: ICD-10-CM

## 2019-01-24 DIAGNOSIS — F41.8 DEPRESSION WITH ANXIETY: ICD-10-CM

## 2019-01-24 LAB
ESTIMATED AVG GLUCOSE: 103 MG/DL
HBA1C MFR BLD HPLC: 5.2 %

## 2019-01-24 PROCEDURE — 99999 PR PBB SHADOW E&M-EST. PATIENT-LVL V: CPT | Mod: PBBFAC,,, | Performed by: FAMILY MEDICINE

## 2019-01-24 PROCEDURE — 3078F DIAST BP <80 MM HG: CPT | Mod: CPTII,S$GLB,, | Performed by: FAMILY MEDICINE

## 2019-01-24 PROCEDURE — 3074F SYST BP LT 130 MM HG: CPT | Mod: CPTII,S$GLB,, | Performed by: FAMILY MEDICINE

## 2019-01-24 PROCEDURE — 3008F PR BODY MASS INDEX (BMI) DOCUMENTED: ICD-10-PCS | Mod: CPTII,S$GLB,, | Performed by: FAMILY MEDICINE

## 2019-01-24 PROCEDURE — 3044F PR MOST RECENT HEMOGLOBIN A1C LEVEL <7.0%: ICD-10-PCS | Mod: CPTII,S$GLB,, | Performed by: FAMILY MEDICINE

## 2019-01-24 PROCEDURE — 99214 PR OFFICE/OUTPT VISIT, EST, LEVL IV, 30-39 MIN: ICD-10-PCS | Mod: S$GLB,,, | Performed by: FAMILY MEDICINE

## 2019-01-24 PROCEDURE — 99214 OFFICE O/P EST MOD 30 MIN: CPT | Mod: S$GLB,,, | Performed by: FAMILY MEDICINE

## 2019-01-24 PROCEDURE — 3078F PR MOST RECENT DIASTOLIC BLOOD PRESSURE < 80 MM HG: ICD-10-PCS | Mod: CPTII,S$GLB,, | Performed by: FAMILY MEDICINE

## 2019-01-24 PROCEDURE — 36415 COLL VENOUS BLD VENIPUNCTURE: CPT | Mod: PO

## 2019-01-24 PROCEDURE — 3044F HG A1C LEVEL LT 7.0%: CPT | Mod: CPTII,S$GLB,, | Performed by: FAMILY MEDICINE

## 2019-01-24 PROCEDURE — 83036 HEMOGLOBIN GLYCOSYLATED A1C: CPT

## 2019-01-24 PROCEDURE — 3008F BODY MASS INDEX DOCD: CPT | Mod: CPTII,S$GLB,, | Performed by: FAMILY MEDICINE

## 2019-01-24 PROCEDURE — 99999 PR PBB SHADOW E&M-EST. PATIENT-LVL V: ICD-10-PCS | Mod: PBBFAC,,, | Performed by: FAMILY MEDICINE

## 2019-01-24 PROCEDURE — 3074F PR MOST RECENT SYSTOLIC BLOOD PRESSURE < 130 MM HG: ICD-10-PCS | Mod: CPTII,S$GLB,, | Performed by: FAMILY MEDICINE

## 2019-01-24 RX ORDER — HYDROCHLOROTHIAZIDE 25 MG/1
25 TABLET ORAL DAILY
Qty: 90 TABLET | Refills: 1 | Status: SHIPPED | OUTPATIENT
Start: 2019-01-24 | End: 2019-10-11

## 2019-01-24 RX ORDER — BUPROPION HYDROCHLORIDE 150 MG/1
150 TABLET ORAL DAILY
Qty: 30 TABLET | Refills: 3 | Status: SHIPPED | OUTPATIENT
Start: 2019-01-24 | End: 2019-06-23 | Stop reason: SDUPTHER

## 2019-01-24 RX ORDER — CITALOPRAM 40 MG/1
40 TABLET, FILM COATED ORAL DAILY
Qty: 30 TABLET | Refills: 0 | Status: CANCELLED | OUTPATIENT
Start: 2019-01-24

## 2019-01-24 NOTE — PROGRESS NOTES
Chief Complaint   Patient presents with    Bunions     left       HPI  Kalee Dupree is a 48 y.o. female with multiple medical diagnoses as listed in the medical history and problem list that presents for follow-up for diabetes, anxiety and depression along with a bunion on her left foot    Left foot- bunion has been painful for her since December. She has had some swelling but this has resolved. Also with hx of hammertoes    Anxiety and depression- has had low sex drive since starting an SSRI, would like to change to a different medication if possible.    PAST MEDICAL HISTORY:  Past Medical History:   Diagnosis Date    Allergy     Anemia     Anxiety     Depression     Diabetes mellitus, type 2     Focal segmental glomerulosclerosis     Hyperlipidemia     Hypertension     Lumbar disc disease     Morbid obesity     FADIA on CPAP     Proteinuria     Retinal artery occlusion        PAST SURGICAL HISTORY:  Past Surgical History:   Procedure Laterality Date     SECTION       SECTION      CHOLECYSTECTOMY      DILATION AND CURETTAGE OF UTERUS      EYE SURGERY         SOCIAL HISTORY:  Social History     Socioeconomic History    Marital status:      Spouse name: Not on file    Number of children: 1    Years of education: Not on file    Highest education level: Not on file   Social Needs    Financial resource strain: Not on file    Food insecurity - worry: Not on file    Food insecurity - inability: Not on file    Transportation needs - medical: Not on file    Transportation needs - non-medical: Not on file   Occupational History     Employer: [employer]   Tobacco Use    Smoking status: Former Smoker     Packs/day: 0.50     Years: 7.00     Pack years: 3.50    Smokeless tobacco: Never Used    Tobacco comment: Quit .   Substance and Sexual Activity    Alcohol use: Yes     Comment: Rare.     Drug use: No    Sexual activity: Yes     Partners: Male    Other Topics Concern    Not on file   Social History Narrative    Several family members on mother side with aneurysms heart and aorta       FAMILY HISTORY:  Family History   Problem Relation Age of Onset    Cancer Mother 56        colon    Diabetes Mother     Hyperlipidemia Mother     Hypertension Mother     Obesity Mother     Kidney disease Mother     Thyroid disease Mother     Colon cancer Mother     Cancer Father         lung    Hypertension Father     Diabetes Brother     Hypertension Brother     Heart disease Paternal Grandfather        ALLERGIES AND MEDICATIONS: updated and reviewed.  Review of patient's allergies indicates:   Allergen Reactions    Amlodipine Nausea And Vomiting     Other reaction(s): Unknown    Amlodipine-benazepril Itching    Azithromycin      Stomach pain, and cramping    Benazepril Nausea And Vomiting     Other reaction(s): Unknown    Hydrocodone      Other reaction(s): Unknown    Hydrocodone-acetaminophen Hives and Itching     Current Outpatient Medications   Medication Sig Dispense Refill    albuterol 90 mcg/actuation inhaler Inhale 2 puffs into the lungs every 6 (six) hours as needed for Wheezing. Rescue 18 g 0    amitriptyline (ELAVIL) 10 MG tablet TAKE 1 TABLET (10 MG TOTAL) BY MOUTH EVERY EVENING. 30 tablet 8    blood sugar diagnostic Strp 1 strip by Misc.(Non-Drug; Combo Route) route once daily. 100 strip 3    FREESTYLE LANCETS 28 gauge lancets 1 LANCET BY MISC.(NON-DRUG COMBO ROUTE) ROUTE ONCE DAILY.  3    hydroCHLOROthiazide (HYDRODIURIL) 25 MG tablet Take 1 tablet (25 mg total) by mouth once daily. 90 tablet 1    lancets Misc 1 lancet by Misc.(Non-Drug; Combo Route) route once daily. 100 each 3    metoprolol succinate (TOPROL-XL) 50 MG 24 hr tablet Take 1 tablet (50 mg total) by mouth once daily. 90 tablet 3    buPROPion (WELLBUTRIN XL) 150 MG TB24 tablet Take 1 tablet (150 mg total) by mouth once daily. 30 tablet 3    fluticasone (FLONASE) 50  "mcg/actuation nasal spray 1 spray by Each Nare route once daily. 16 g 1    tolterodine (DETROL LA) 4 MG 24 hr capsule Take 1 capsule (4 mg total) by mouth once daily. 90 capsule 0     Current Facility-Administered Medications   Medication Dose Route Frequency Provider Last Rate Last Dose    sodium chloride 0.9% bolus 1,000 mL  1,000 mL Intravenous Once Arlette Chavez MD           ROS  Review of Systems   Constitutional: Negative for chills, diaphoresis, fatigue, fever and unexpected weight change.   HENT: Negative for rhinorrhea, sinus pressure, sore throat and tinnitus.    Eyes: Negative for photophobia and visual disturbance.   Respiratory: Negative for cough, shortness of breath and wheezing.    Cardiovascular: Negative for chest pain and palpitations.   Gastrointestinal: Negative for abdominal pain, blood in stool, constipation, diarrhea, nausea and vomiting.   Genitourinary: Negative for dysuria, flank pain, frequency and vaginal discharge.   Musculoskeletal: Positive for arthralgias and gait problem. Negative for joint swelling.   Skin: Negative for rash.   Neurological: Negative for speech difficulty, weakness, light-headedness and headaches.   Psychiatric/Behavioral: Negative for behavioral problems and dysphoric mood.       Physical Exam  Vitals:    01/24/19 0710   BP: 124/78   Pulse: 70   Resp: 18   Temp: 98.1 °F (36.7 °C)   TempSrc: Oral   SpO2: 97%   Weight: 131.7 kg (290 lb 5.5 oz)   Height: 5' 9" (1.753 m)    Body mass index is 42.88 kg/m².  Weight: 131.7 kg (290 lb 5.5 oz)   Height: 5' 9" (175.3 cm)     Physical Exam   Constitutional: She is oriented to person, place, and time. She appears well-developed and well-nourished.   Eyes: EOM are normal.   Musculoskeletal:   Left foot with great toe bunion and second toe with hammertoe protrusion. Some masseration btw toes   Neurological: She is alert and oriented to person, place, and time.   Skin: Skin is warm and dry. No rash noted. No erythema. "   Psychiatric: She has a normal mood and affect. Her behavior is normal.   Nursing note and vitals reviewed.      Health Maintenance       Date Due Completion Date    Pneumococcal Vaccine (Medium Risk) (1 of 1 - PPSV23) 11/24/1989 ---    Pap Smear with HPV Cotest 11/24/1991 ---    Eye Exam 08/02/2017 8/2/2016    Foot Exam 06/16/2018 6/16/2017    Urine Microalbumin 06/16/2018 6/16/2017    Hemoglobin A1c 02/20/2019 8/20/2018    Mammogram 06/21/2019 6/21/2017    Lipid Panel 08/20/2019 8/20/2018    TETANUS VACCINE 06/17/2024 6/17/2014            ASSESSMENT     1. Type 2 diabetes mellitus without complication, without long-term current use of insulin    2. Hypertensive heart disease with diastolic heart failure    3. Depression with anxiety    4. Morbid obesity with BMI of 45.0-49.9, adult    5. Bunion of left foot    6. Encounter for screening mammogram for breast cancer        PLAN:     Problem List Items Addressed This Visit        Psychiatric    Depression with anxiety  -we will change to wellbutrin  -may decrease celexa by half for one to two weeks then stop    Relevant Medications    buPROPion (WELLBUTRIN XL) 150 MG TB24 tablet       Cardiac/Vascular    Hypertensive heart disease with diastolic heart failure  -continue current regimen    Relevant Medications    hydroCHLOROthiazide (HYDRODIURIL) 25 MG tablet       Endocrine    Type 2 diabetes mellitus without complication, without long-term current use of insulin - Primary  -A1C controlled  -currently diet controlled for management    Relevant Orders    Ambulatory referral to Podiatry    Microalbumin/creatinine urine ratio    Hemoglobin A1c    Morbid obesity with BMI of 45.0-49.9, adult  -s/p gastric surgery  -continue working towards weight loss  -she is having excess skin and would like this removed but she will confirm with her insurance if this is covered      Other Visit Diagnoses     Bunion of left foot        Relevant Orders    Ambulatory referral to Podiatry     Encounter for screening mammogram for breast cancer        Relevant Orders    Mammo Digital Screening Elmer Chavez MD  01/24/2019 7:45 AM        Follow-up in about 1 month (around 2/24/2019) for Follow up.

## 2019-01-25 DIAGNOSIS — E11.9 TYPE 2 DIABETES MELLITUS WITHOUT COMPLICATION: ICD-10-CM

## 2019-01-31 ENCOUNTER — PATIENT MESSAGE (OUTPATIENT)
Dept: FAMILY MEDICINE | Facility: CLINIC | Age: 49
End: 2019-01-31

## 2019-02-01 ENCOUNTER — TELEPHONE (OUTPATIENT)
Dept: FAMILY MEDICINE | Facility: CLINIC | Age: 49
End: 2019-02-01

## 2019-02-01 NOTE — LETTER
February 1, 2019    Kalee Dupree  [address] [place] [address] [address] [place] [address]             [facility]  [address]  [place] [address]  Phone: [phone]  Fax: [phone] Dear Mrs. Dupree:    Sorry we were unable to contact you to schedule your Podiatry appointment. Please give the referral department a call at [phone].        If you have any questions or concerns, please don't hesitate to call.    Sincerely,        Clarisa Mohan MA

## 2019-02-07 ENCOUNTER — OFFICE VISIT (OUTPATIENT)
Dept: PODIATRY | Facility: CLINIC | Age: 49
End: 2019-02-07
Payer: COMMERCIAL

## 2019-02-07 ENCOUNTER — LAB VISIT (OUTPATIENT)
Dept: LAB | Facility: HOSPITAL | Age: 49
End: 2019-02-07
Attending: PODIATRIST
Payer: COMMERCIAL

## 2019-02-07 VITALS
SYSTOLIC BLOOD PRESSURE: 129 MMHG | HEIGHT: 69 IN | DIASTOLIC BLOOD PRESSURE: 76 MMHG | WEIGHT: 290.38 LBS | BODY MASS INDEX: 43.01 KG/M2

## 2019-02-07 DIAGNOSIS — M21.619 BUNION: ICD-10-CM

## 2019-02-07 DIAGNOSIS — E11.49 TYPE II DIABETES MELLITUS WITH NEUROLOGICAL MANIFESTATIONS: ICD-10-CM

## 2019-02-07 DIAGNOSIS — M20.40 HAMMER TOE, UNSPECIFIED LATERALITY: ICD-10-CM

## 2019-02-07 DIAGNOSIS — E11.49 TYPE II DIABETES MELLITUS WITH NEUROLOGICAL MANIFESTATIONS: Primary | ICD-10-CM

## 2019-02-07 LAB — URATE SERPL-MCNC: 8.1 MG/DL

## 2019-02-07 PROCEDURE — 3078F DIAST BP <80 MM HG: CPT | Mod: CPTII,S$GLB,, | Performed by: PODIATRIST

## 2019-02-07 PROCEDURE — 3074F PR MOST RECENT SYSTOLIC BLOOD PRESSURE < 130 MM HG: ICD-10-PCS | Mod: CPTII,S$GLB,, | Performed by: PODIATRIST

## 2019-02-07 PROCEDURE — 3078F PR MOST RECENT DIASTOLIC BLOOD PRESSURE < 80 MM HG: ICD-10-PCS | Mod: CPTII,S$GLB,, | Performed by: PODIATRIST

## 2019-02-07 PROCEDURE — 84550 ASSAY OF BLOOD/URIC ACID: CPT

## 2019-02-07 PROCEDURE — 36415 COLL VENOUS BLD VENIPUNCTURE: CPT | Mod: PO

## 2019-02-07 PROCEDURE — 3044F PR MOST RECENT HEMOGLOBIN A1C LEVEL <7.0%: ICD-10-PCS | Mod: CPTII,S$GLB,, | Performed by: PODIATRIST

## 2019-02-07 PROCEDURE — 99214 PR OFFICE/OUTPT VISIT, EST, LEVL IV, 30-39 MIN: ICD-10-PCS | Mod: S$GLB,,, | Performed by: PODIATRIST

## 2019-02-07 PROCEDURE — 99999 PR PBB SHADOW E&M-EST. PATIENT-LVL III: CPT | Mod: PBBFAC,,, | Performed by: PODIATRIST

## 2019-02-07 PROCEDURE — 3008F BODY MASS INDEX DOCD: CPT | Mod: CPTII,S$GLB,, | Performed by: PODIATRIST

## 2019-02-07 PROCEDURE — 3044F HG A1C LEVEL LT 7.0%: CPT | Mod: CPTII,S$GLB,, | Performed by: PODIATRIST

## 2019-02-07 PROCEDURE — 3008F PR BODY MASS INDEX (BMI) DOCUMENTED: ICD-10-PCS | Mod: CPTII,S$GLB,, | Performed by: PODIATRIST

## 2019-02-07 PROCEDURE — 99999 PR PBB SHADOW E&M-EST. PATIENT-LVL III: ICD-10-PCS | Mod: PBBFAC,,, | Performed by: PODIATRIST

## 2019-02-07 PROCEDURE — 3074F SYST BP LT 130 MM HG: CPT | Mod: CPTII,S$GLB,, | Performed by: PODIATRIST

## 2019-02-07 PROCEDURE — 99214 OFFICE O/P EST MOD 30 MIN: CPT | Mod: S$GLB,,, | Performed by: PODIATRIST

## 2019-02-07 RX ORDER — MUPIROCIN CALCIUM 20 MG/G
CREAM TOPICAL 3 TIMES DAILY
Qty: 1 G | Refills: 0 | Status: SHIPPED | OUTPATIENT
Start: 2019-02-07 | End: 2019-10-11

## 2019-02-07 NOTE — PATIENT INSTRUCTIONS
Recommend lotions: eucerin, eucerin for diabetics, aquaphor, A&D ointment, gold bond for diabetics, sween, Earlton's Bees all purpose baby ointment,  urea 40 with aloe (found on amazon.com)    Shoe recommendations: (try 6pm.com, zappos.J C Lads , nordstromrack.J C Lads, or shoes.J C Lads for discounted prices) you can visit DSW shoes in [place]  or [employer]BUonline in the [facility] (there are also several shoe brand outlets in the [facility])    Asics (GT 2000 or gel foundations), new balance stability type shoes, saucony (stabil c3),  Wong (GTS or Beast or transcend), propet (tennis shoe)    Sofleonardo Zamora (women) Yissel&Jani (men), clarks, crocs, aerosoles, naturalizers, SAS, ecco, born, aracely houser, rockports (dress shoes)    Vionic, burkenstocks, fitflops, propet (sandals)  Nike comfort thong sandals, crocs, propet (house shoes)    Nail Home remedy:  Vicks Vapor rub to nails for easier manageability    Occasional soaks for 15-20 mins in luke warm water with 1/2 cup of listerine and 1 cup of apple cider vinegar are ok You may add several drops of oil of oregano or tea tree oil as well      Diabetes: Inspecting Your Feet  Diabetes increases your chances of developing foot problems. So inspect your feet every day. This helps you find small skin irritations before they become serious infections. If you have trouble seeing the bottoms of your feet, use a mirror or ask a family member or friend to help.     Pressure spots on the bottom of the foot are common areas where problems develop.   How to check your feet  Below are tips to help you look for foot problems. Try to check your feet at the same time each day, such as when you get out of bed in the morning:  · Check the top of each foot. The tops of toes, back of the heel, and outer edge of the foot can get a lot of rubbing from poor-fitting shoes.  · Check the bottom of each foot. Daily wear and tear often leads to problems at pressure spots.  · Check the toes and nails. Fungal  infections often occur between toes. Toenail problems can also be a sign of fungal infections or lead to breaks in the skin.  · Check your shoes, too. Loose objects inside a shoe can injure the foot. Use your hand to feel inside your shoes for things like jensen, loose stitching, or rough areas that could irritate your skin.  Warning signs  Look for any color changes in the foot. Redness with streaks can signal a severe infection, which needs immediate medical attention. Tell your doctor right away if you have any of these problems:  · Swelling, sometimes with color changes, may be a sign of poor blood flow or infection. Symptoms include tenderness and an increase in the size of your foot.  · Warm or hot areas on your feet may be signs of infection. A foot that is cold may not be getting enough blood.  · Sensations such as burning, tingling, or pins and needles can be signs of a problem. Also check for areas that may be numb.  · Hot spots are caused by friction or pressure. Look for hot spots in areas that get a lot of rubbing. Hot spots can turn into blisters, calluses, or sores.  · Cracks and sores are caused by dry or irritated skin. They are a sign that the skin is breaking down, which can lead to infection.  · Toenail problems to watch for include nails growing into the skin (ingrown toenail) and causing redness or pain. Thick, yellow, or discolored nails can signal a fungal infection.  · Drainage and odor can develop from untreated sores and ulcers. Call your doctor right away if you notice white or yellow drainage, bleeding, or unpleasant odor.   © 3212-6291 [employer] [address] [place] [address]. All rights reserved. This information is not intended as a substitute for professional medical care. Always follow your healthcare professional's instructions.        Step-by-Step:  Inspecting Your Feet (Diabetes)    Date Last Reviewed: 10/1/2016  © 0496-4275 [employer] [address] [place] [address]. All rights reserved. This information is not intended as a substitute for professional medical care. Always follow your healthcare professional's instructions.

## 2019-02-07 NOTE — LETTER
February 14, 2019      Arlette Chavez MD  [address]  [place] [address]           [facility]  [address] [facility]  [place] [address]  Phone: [phone]          Patient: Kalee Dupree   MR Number: [id number]   YOB: 1970   Date of Visit: 2/7/2019       Dear Dr. Arlette Chavez:    Thank you for referring Kalee Dupree to me for evaluation. Attached you will find relevant portions of my assessment and plan of care.    If you have questions, please do not hesitate to call me. I look forward to following Kalee Dupree along with you.    Sincerely,    Remy Peres  CC:  No Recipients    If you would like to receive this communication electronically, please contact [email or web address] or [phone] to request more information on [employer] access.    For providers and/or their staff who would like to refer a patient to [facility], please contact us through our one-stop-shop provider referral line, [facility] , at [phone].    If you feel you have received this communication in error or would no longer like to receive these types of communications, please e-mail [email or web address]

## 2019-02-08 ENCOUNTER — TELEPHONE (OUTPATIENT)
Dept: ADMINISTRATIVE | Facility: HOSPITAL | Age: 49
End: 2019-02-08

## 2019-02-08 ENCOUNTER — TELEPHONE (OUTPATIENT)
Dept: PODIATRY | Facility: CLINIC | Age: 49
End: 2019-02-08

## 2019-02-08 ENCOUNTER — APPOINTMENT (OUTPATIENT)
Dept: RADIOLOGY | Facility: HOSPITAL | Age: 49
End: 2019-02-08
Attending: PODIATRIST
Payer: COMMERCIAL

## 2019-02-08 DIAGNOSIS — S92.335A CLOSED NONDISPLACED FRACTURE OF THIRD METATARSAL BONE OF LEFT FOOT, INITIAL ENCOUNTER: Primary | ICD-10-CM

## 2019-02-08 DIAGNOSIS — E11.49 TYPE II DIABETES MELLITUS WITH NEUROLOGICAL MANIFESTATIONS: ICD-10-CM

## 2019-02-08 DIAGNOSIS — M21.619 BUNION: ICD-10-CM

## 2019-02-08 PROCEDURE — 73630 XR FOOT COMPLETE 3 VIEW LEFT: ICD-10-PCS | Mod: 26,LT,, | Performed by: RADIOLOGY

## 2019-02-08 PROCEDURE — 73630 X-RAY EXAM OF FOOT: CPT | Mod: TC,FY,PN,LT

## 2019-02-08 PROCEDURE — 73630 X-RAY EXAM OF FOOT: CPT | Mod: 26,LT,, | Performed by: RADIOLOGY

## 2019-02-08 NOTE — TELEPHONE ENCOUNTER
Phone call from Vignesh at [facility]     States that he can only perform partial weight bearing foot xray due to limit     AP will be non weight bearing but LAT will be partial weight bearing

## 2019-02-10 DIAGNOSIS — F41.8 DEPRESSION WITH ANXIETY: ICD-10-CM

## 2019-02-11 ENCOUNTER — TELEPHONE (OUTPATIENT)
Dept: PODIATRY | Facility: CLINIC | Age: 49
End: 2019-02-11

## 2019-02-11 RX ORDER — METHYLPREDNISOLONE 4 MG/1
TABLET ORAL
Qty: 1 PACKAGE | Refills: 0 | Status: SHIPPED | OUTPATIENT
Start: 2019-02-11 | End: 2019-03-04

## 2019-02-11 RX ORDER — CITALOPRAM 40 MG/1
40 TABLET, FILM COATED ORAL DAILY
Qty: 30 TABLET | Refills: 0 | Status: SHIPPED | OUTPATIENT
Start: 2019-02-11 | End: 2019-03-19 | Stop reason: SDUPTHER

## 2019-02-11 NOTE — TELEPHONE ENCOUNTER
Contacted patient regarding xray results, hypertrophic bone formation noted likely consistent with prior fracture. Patient does not recall trauma to area. Does report h/o Vit D deficiency. Uric acid also elevated. Medrol dose pack sent to pharmacy. Instructed patient to  CAM boot tomorrow for immobilization left foot. Vit. D level ordered as well. PCP follow up set up for 2/26

## 2019-02-11 NOTE — TELEPHONE ENCOUNTER
----- Message from Karen Carter DPM sent at 2/11/2019  5:05 PM CST -----  Patient will be by tomorrow around 3:30 pm to  short CAM boot. Could you please fit her with the boot she states she is a size 11 shoe. Thanks. If we do not have that size please let her know to go to main [place] thanks.

## 2019-02-16 NOTE — PROGRESS NOTES
Subjective:      Patient ID: Kalee Dupree is a 48 y.o. female.    Chief Complaint: Diabetes Mellitus (pcp Hardy 1-24-19); Diabetic Foot Exam; and Nail Care    Kalee is a 48 y.o. female who presents to the clinic for evaluation and treatment of high risk feet. Kalee has a past medical history of Allergy, Anemia, Anxiety, Depression, Diabetes mellitus, type 2, Focal segmental glomerulosclerosis, Hyperlipidemia, Hypertension, Lumbar disc disease, Morbid obesity, FADIA on CPAP, Proteinuria, and Retinal artery occlusion. The patient's chief complaint is long, thick toenails that are difficult for her to trim. Often causes bleeding when she trims her nails. Has pain in both feet but attributes this to her diabetic neuropathy and low back pain/disc problem and sciatica. Takes gabapentin which helps ease the pain most of the time. Patient is here to have comprehensive DM foot exam and to establish care per recommendations of PCP. Inquiring about DM shoes and has general questions regarding her foot health and DM. Also has generalized pain in hammertoes. This patient has documented high risk feet requiring routine maintenance secondary to diabetes mellitis and those secondary complications of diabetes, as mentioned..    2/7/19: Presents with left foot pain x one month. Denies trauma to area. Reports radiating pain to dorsal foot. Also reports worsening bunion deformity left foot. Previously followed by Dr. Chance plummer to me.     PCP: Arlette Chavez MD    Date Last Seen by PCP:   Chief Complaint   Patient presents with    Diabetes Mellitus     pcp Scott 1-24-19    Diabetic Foot Exam    Nail Care       Current shoe gear:   Slip-on shoes         Hemoglobin A1C   Date Value Ref Range Status   01/24/2019 5.2 4.0 - 5.6 % Final     Comment:     ADA Screening Guidelines:  5.7-6.4%  Consistent with prediabetes  >or=6.5%  Consistent with diabetes  High levels of fetal hemoglobin interfere with the HbA1C  assay. Heterozygous  hemoglobin variants (HbS, HgC, etc)do  not significantly interfere with this assay.   However, presence of multiple variants may affect accuracy.     2018 5.4 4.0 - 5.6 % Final     Comment:     ADA Screening Guidelines:  5.7-6.4%  Consistent with prediabetes  >or=6.5%  Consistent with diabetes  High levels of fetal hemoglobin interfere with the HbA1C  assay. Heterozygous hemoglobin variants (HbS, HgC, etc)do  not significantly interfere with this assay.   However, presence of multiple variants may affect accuracy.     2017 7.2 (H) 4.0 - 5.6 % Final     Comment:     According to ADA guidelines, hemoglobin A1c <7.0% represents  optimal control in non-pregnant diabetic patients. Different  metrics may apply to specific patient populations.   Standards of Medical Care in Diabetes-2016.  For the purpose of screening for the presence of diabetes:  <5.7%     Consistent with the absence of diabetes  5.7-6.4%  Consistent with increasing risk for diabetes   (prediabetes)  >or=6.5%  Consistent with diabetes  Currently, no consensus exists for use of hemoglobin A1c  for diagnosis of diabetes for children.  This Hemoglobin A1c assay has significant interference with fetal   hemoglobin   (HbF). The results are invalid for patients with abnormal amounts of   HbF,   including those with known Hereditary Persistence   of Fetal Hemoglobin. Heterozygous hemoglobin variants (HbAS, HbAC,   HbAD, HbAE, HbA2) do not significantly interfere with this assay;   however, presence of multiple variants in a sample may impact the %   interference.       Past Medical History:   Diagnosis Date    Allergy     Anemia     Anxiety     Depression     Diabetes mellitus, type 2     Focal segmental glomerulosclerosis     Hyperlipidemia     Hypertension     Lumbar disc disease     Morbid obesity     FADIA on CPAP     Proteinuria     Retinal artery occlusion        Past Surgical History:   Procedure Laterality Date      SECTION       SECTION      CHOLECYSTECTOMY      DILATION AND CURETTAGE OF UTERUS      EYE SURGERY         Family History   Problem Relation Age of Onset    Cancer Mother 56        colon    Diabetes Mother     Hyperlipidemia Mother     Hypertension Mother     Obesity Mother     Kidney disease Mother     Thyroid disease Mother     Colon cancer Mother     Cancer Father         lung    Hypertension Father     Diabetes Brother     Hypertension Brother     Heart disease Paternal Grandfather        Social History     Socioeconomic History    Marital status:      Spouse name: None    Number of children: 1    Years of education: None    Highest education level: None   Social Needs    Financial resource strain: None    Food insecurity - worry: None    Food insecurity - inability: None    Transportation needs - medical: None    Transportation needs - non-medical: None   Occupational History     Employer: [employer]   Tobacco Use    Smoking status: Former Smoker     Packs/day: 0.50     Years: 7.00     Pack years: 3.50    Smokeless tobacco: Never Used    Tobacco comment: Quit .   Substance and Sexual Activity    Alcohol use: Yes     Comment: Rare.     Drug use: No    Sexual activity: Yes     Partners: Male   Other Topics Concern    None   Social History Narrative    Several family members on mother side with aneurysms heart and aorta       Current Outpatient Medications   Medication Sig Dispense Refill    albuterol 90 mcg/actuation inhaler Inhale 2 puffs into the lungs every 6 (six) hours as needed for Wheezing. Rescue 18 g 0    blood sugar diagnostic Strp 1 strip by Misc.(Non-Drug; Combo Route) route once daily. 100 strip 3    buPROPion (WELLBUTRIN XL) 150 MG TB24 tablet Take 1 tablet (150 mg total) by mouth once daily. 30 tablet 3    fluticasone (FLONASE) 50 mcg/actuation nasal spray 1 spray by Each Nare route once daily. 16 g 1    FREESTYLE LANCETS 28  gauge lancets 1 LANCET BY MISC.(NON-DRUG COMBO ROUTE) ROUTE ONCE DAILY.  3    hydroCHLOROthiazide (HYDRODIURIL) 25 MG tablet Take 1 tablet (25 mg total) by mouth once daily. 90 tablet 1    lancets Misc 1 lancet by Misc.(Non-Drug; Combo Route) route once daily. 100 each 3    metoprolol succinate (TOPROL-XL) 50 MG 24 hr tablet Take 1 tablet (50 mg total) by mouth once daily. 90 tablet 3    tolterodine (DETROL LA) 4 MG 24 hr capsule Take 1 capsule (4 mg total) by mouth once daily. 90 capsule 0    amitriptyline (ELAVIL) 10 MG tablet TAKE 1 TABLET (10 MG TOTAL) BY MOUTH EVERY EVENING. 30 tablet 8    citalopram (CELEXA) 40 MG tablet TAKE 1 TABLET (40 MG TOTAL) BY MOUTH ONCE DAILY. 30 tablet 0    methylPREDNISolone (MEDROL, KEVIN,) 4 mg tablet use as directed 1 Package 0    mupirocin calcium 2% (BACTROBAN) 2 % cream Apply topically 3 (three) times daily. 1 g 0     Current Facility-Administered Medications   Medication Dose Route Frequency Provider Last Rate Last Dose    sodium chloride 0.9% bolus 1,000 mL  1,000 mL Intravenous Once Arlette Chavez MD           Review of patient's allergies indicates:   Allergen Reactions    Amlodipine      Other reaction(s): Unknown    Azithromycin      Stomach pain, and cramping    Benazepril      Other reaction(s): Unknown    Hydrocodone      Other reaction(s): Unknown    Lotrel  [amlodipine-benazepril] Itching    Vicodin  [hydrocodone-acetaminophen] Hives and Itching       Review of Systems   Constitution: Negative for chills and fever.   Cardiovascular: Positive for leg swelling. Negative for chest pain and claudication.   Respiratory: Negative for cough and shortness of breath.    Skin: Positive for dry skin and nail changes.   Musculoskeletal:        Toe pain   Gastrointestinal: Negative for nausea and vomiting.   Neurological: Positive for numbness, paresthesias and sensory change.   Psychiatric/Behavioral: Negative for altered mental status.           Objective:       Physical Exam   Constitutional: She is oriented to person, place, and time. She appears well-developed and well-nourished.   HENT:   Head: Normocephalic.   Cardiovascular: Intact distal pulses.   Pulses:       Dorsalis pedis pulses are 1+ on the right side, and 1+ on the left side.        Posterior tibial pulses are 1+ on the right side, and 1+ on the left side.   CRT < 3 sec to tips of toes. ++ edema noted to b/l LE. Mild spider veins and vericosities noted to b/l LEs.      Pulmonary/Chest: No respiratory distress.   Musculoskeletal:   Gastrocnemius equinus noted to b/l ankles with decreased DF noted on exam. MMT 5/5 in DF/PF/Inv/Ev resistance with no reproduction of pain in any direction. Passive range of motion of ankle and pedal joints is painless. Adequate pedal joint ROM.     Semi-rigid hammertoe contractures noted to toes 2-4 b/l-asymptomatic. Plantarflexed 1st ray bilateral with pes cavus foot type b/l.     Mild pain upon palpation left dorsal foot. With pain radiating distally           Patient has hammertoes of digits 2-5 bilateral partially reducible without symptom today.     Visible and palpable bunion with pain at dorsomedial 1st metatarsal head left .  Hallux abducted right and left partially reducible, tracks laterally without being track bound.  No ecchymosis, erythema, edema, or cardinal signs infection or signs of trauma same foot.         Feet:   Right Foot:   Protective Sensation: 10 sites tested. 7 sites sensed.   Left Foot:   Protective Sensation: 10 sites tested. 7 sites sensed.   Neurological: She is alert and oriented to person, place, and time. She has normal strength. A sensory deficit is present.   Light touch, proprioception, and sharp/dull sensation are all intact bilaterally. Protective threshold with the Bethesda-Wienstein monofilament is diminished at toes bilaterally. Vibratory sensation diminished b/l distal foot. Subjective paresthesias with no clearly identifiable source or  trigger.      Skin: Skin is warm, dry and intact. No erythema.   No open lesions, lacerations or wounds noted. Nails are thickened, elongated, discolored yellow/brown with subungual debris and brittleness to R 1-5 and L 1-5. Interdigital spaces clean, dry and intact b/l. No erythema noted to b/l foot. Skin texture thin, atrophic, dry. Pedal hair diminished. Toes cool to touch. Hyperkeratotic tissue noted to distal tips of toes 2 and 3 bilateral.     Superficial abrasion right medial leg. No purulent drainage noted.    Psychiatric: She has a normal mood and affect. Her behavior is normal. Judgment and thought content normal.   Vitals reviewed.            Assessment:       Encounter Diagnoses   Name Primary?    Type II diabetes mellitus with neurological manifestations Yes    Hammer toe, unspecified laterality     Bunion          Plan:       Kalee was seen today for diabetes mellitus, diabetic foot exam and nail care.    Diagnoses and all orders for this visit:    Type II diabetes mellitus with neurological manifestations  -     DIABETIC SHOES FOR HOME USE  -     X-Ray Foot Complete Left; Future  -     Uric acid; Future    Hammer toe, unspecified laterality  -     DIABETIC SHOES FOR HOME USE    Bunion  -     DIABETIC SHOES FOR HOME USE  -     X-Ray Foot Complete Left; Future  -     Uric acid; Future    Other orders  -     mupirocin calcium 2% (BACTROBAN) 2 % cream; Apply topically 3 (three) times daily.      I counseled the patient on her conditions, their implications and medical management.      Uric acid level ordered to r/o gout.     Weightbearing left foot xray to assess underlying deformity and for underlying osseous pathology.     Rx diabetic shoes with custom molded inserts to be worn at all times while ambulating. Prescription provided with list of local retailers.     Bactroban prescribed for daily application to right leg.         - Shoe inspection. Diabetic Foot Education. Patient reminded of the  importance of good nutrition and blood sugar control to help prevent podiatric complications of diabetes. Patient instructed on proper foot hygeine. We discussed wearing proper shoe gear, daily foot inspections, never walking without protective shoe gear, never putting sharp instruments to feet, routine podiatric nail visits every 2-3 months.      Rx diabetic shoes with custom molded inserts to be worn at all times while ambulating. Prescription provided with list of local retailers.     Discussed regular and routine moisturizer to skin of both feet to help improve dry skin. Advised to apply twice daily until resolution of symptoms. Avoid between toes.     F/u 6 weeks.     Karen Carter DPM

## 2019-03-19 DIAGNOSIS — F41.8 DEPRESSION WITH ANXIETY: ICD-10-CM

## 2019-03-19 RX ORDER — CITALOPRAM 40 MG/1
TABLET, FILM COATED ORAL
Qty: 30 TABLET | Refills: 0 | Status: SHIPPED | OUTPATIENT
Start: 2019-03-19 | End: 2019-10-11

## 2019-03-28 ENCOUNTER — OFFICE VISIT (OUTPATIENT)
Dept: FAMILY MEDICINE | Facility: CLINIC | Age: 49
End: 2019-03-28
Payer: COMMERCIAL

## 2019-03-28 VITALS
BODY MASS INDEX: 43.01 KG/M2 | HEART RATE: 84 BPM | SYSTOLIC BLOOD PRESSURE: 130 MMHG | DIASTOLIC BLOOD PRESSURE: 86 MMHG | WEIGHT: 290.38 LBS | OXYGEN SATURATION: 96 % | HEIGHT: 69 IN | TEMPERATURE: 100 F

## 2019-03-28 DIAGNOSIS — I11.0 HYPERTENSIVE HEART DISEASE WITH DIASTOLIC HEART FAILURE: ICD-10-CM

## 2019-03-28 DIAGNOSIS — A08.4 VIRAL GASTROENTERITIS: Primary | ICD-10-CM

## 2019-03-28 DIAGNOSIS — Z20.828 EXPOSURE TO THE FLU: ICD-10-CM

## 2019-03-28 DIAGNOSIS — I50.30 HYPERTENSIVE HEART DISEASE WITH DIASTOLIC HEART FAILURE: ICD-10-CM

## 2019-03-28 PROCEDURE — 99999 PR PBB SHADOW E&M-EST. PATIENT-LVL III: CPT | Mod: PBBFAC,,, | Performed by: PHYSICIAN ASSISTANT

## 2019-03-28 PROCEDURE — 3008F PR BODY MASS INDEX (BMI) DOCUMENTED: ICD-10-PCS | Mod: CPTII,S$GLB,, | Performed by: PHYSICIAN ASSISTANT

## 2019-03-28 PROCEDURE — 3079F PR MOST RECENT DIASTOLIC BLOOD PRESSURE 80-89 MM HG: ICD-10-PCS | Mod: CPTII,S$GLB,, | Performed by: PHYSICIAN ASSISTANT

## 2019-03-28 PROCEDURE — 99999 PR PBB SHADOW E&M-EST. PATIENT-LVL III: ICD-10-PCS | Mod: PBBFAC,,, | Performed by: PHYSICIAN ASSISTANT

## 2019-03-28 PROCEDURE — 99214 PR OFFICE/OUTPT VISIT, EST, LEVL IV, 30-39 MIN: ICD-10-PCS | Mod: S$GLB,,, | Performed by: PHYSICIAN ASSISTANT

## 2019-03-28 PROCEDURE — 3075F PR MOST RECENT SYSTOLIC BLOOD PRESS GE 130-139MM HG: ICD-10-PCS | Mod: CPTII,S$GLB,, | Performed by: PHYSICIAN ASSISTANT

## 2019-03-28 PROCEDURE — 99214 OFFICE O/P EST MOD 30 MIN: CPT | Mod: S$GLB,,, | Performed by: PHYSICIAN ASSISTANT

## 2019-03-28 PROCEDURE — 3079F DIAST BP 80-89 MM HG: CPT | Mod: CPTII,S$GLB,, | Performed by: PHYSICIAN ASSISTANT

## 2019-03-28 PROCEDURE — 3008F BODY MASS INDEX DOCD: CPT | Mod: CPTII,S$GLB,, | Performed by: PHYSICIAN ASSISTANT

## 2019-03-28 PROCEDURE — 3075F SYST BP GE 130 - 139MM HG: CPT | Mod: CPTII,S$GLB,, | Performed by: PHYSICIAN ASSISTANT

## 2019-03-28 RX ORDER — LOPERAMIDE HYDROCHLORIDE 2 MG/1
2 CAPSULE ORAL 3 TIMES DAILY PRN
Qty: 30 CAPSULE | Refills: 0 | Status: SHIPPED | OUTPATIENT
Start: 2019-03-28 | End: 2019-04-07

## 2019-03-28 RX ORDER — ONDANSETRON 4 MG/1
4 TABLET, FILM COATED ORAL EVERY 8 HOURS PRN
Qty: 30 TABLET | Refills: 0 | Status: SHIPPED | OUTPATIENT
Start: 2019-03-28 | End: 2019-04-07

## 2019-03-28 NOTE — PROGRESS NOTES
Patient Name: Kalee Dupree    : 1970  MRN: [id number]    Subjective:  Kalee is a 48 y.o. female who presents today for:    Chief Complaint   Patient presents with    Diarrhea    Fever    Headache    Generalized Body Aches    Nasal Congestion       HPI  Patient has multiple medical diagnoses as listed below in the history. She complains of above symptoms for two days. The symptoms came on gradually Tuesday night and have remained unchanged. She has taken tylenol for body aches with some relief. She complains of watery diarrhea since onset of the symptoms- a few episodes in two days. She had a gastric sleeve last year and occasionally get diarrhea, but reports this to be worse. She complains of nausea and has not eaten much in two days, only soup. She had subjective fever that has been controlled with the tylenol. 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 She denies wheezing, dyspnea, vomiting or chest pain    Past Medical History  Past Medical History:   Diagnosis Date    Allergy     Anemia     Anxiety     Depression     Diabetes mellitus, type 2     Focal segmental glomerulosclerosis     Hyperlipidemia     Hypertension     Lumbar disc disease     Morbid obesity     FADIA on CPAP     Proteinuria     Retinal artery occlusion        Past Surgical History  Past Surgical History:   Procedure Laterality Date     SECTION       SECTION      CHOLECYSTECTOMY      DILATION AND CURETTAGE OF UTERUS      EYE SURGERY         Family History  Family History   Problem Relation Age of Onset    Cancer Mother 56        colon    Diabetes Mother     Hyperlipidemia Mother     Hypertension Mother     Obesity Mother     Kidney disease Mother     Thyroid disease Mother     Colon cancer Mother     Cancer Father         lung    Hypertension Father     Diabetes Brother     Hypertension Brother     Heart disease Paternal Grandfather        Social  History  Social History     Socioeconomic History    Marital status:      Spouse name: Not on file    Number of children: 1    Years of education: Not on file    Highest education level: Not on file   Occupational History     Employer: [employer]   Social Needs    Financial resource strain: Not on file    Food insecurity:     Worry: Not on file     Inability: Not on file    Transportation needs:     Medical: Not on file     Non-medical: Not on file   Tobacco Use    Smoking status: Former Smoker     Packs/day: 0.50     Years: 7.00     Pack years: 3.50    Smokeless tobacco: Never Used    Tobacco comment: Quit 1993.   Substance and Sexual Activity    Alcohol use: Yes     Comment: Rare.     Drug use: No    Sexual activity: Yes     Partners: Male   Lifestyle    Physical activity:     Days per week: Not on file     Minutes per session: Not on file    Stress: Not on file   Relationships    Social connections:     Talks on phone: Not on file     Gets together: Not on file     Attends [employer] service: Not on file     Active member of club or organization: Not on file     Attends meetings of clubs or organizations: Not on file     Relationship status: Not on file    Intimate partner violence:     Fear of current or ex partner: Not on file     Emotionally abused: Not on file     Physically abused: Not on file     Forced sexual activity: Not on file   Other Topics Concern    Not on file   Social History Narrative    Several family members on mother side with aneurysms heart and aorta       Current Medications  Current Outpatient Medications on File Prior to Visit   Medication Sig Dispense Refill    buPROPion (WELLBUTRIN XL) 150 MG TB24 tablet Take 1 tablet (150 mg total) by mouth once daily. 30 tablet 3    citalopram (CELEXA) 40 MG tablet TAKE 1 TABLET BY MOUTH EVERY DAY 30 tablet 0    fluticasone (FLONASE) 50 mcg/actuation nasal spray 1 spray by Each Nare route once daily. 16 g 1     hydroCHLOROthiazide (HYDRODIURIL) 25 MG tablet Take 1 tablet (25 mg total) by mouth once daily. 90 tablet 1    metoprolol succinate (TOPROL-XL) 50 MG 24 hr tablet Take 1 tablet (50 mg total) by mouth once daily. 90 tablet 3    albuterol 90 mcg/actuation inhaler Inhale 2 puffs into the lungs every 6 (six) hours as needed for Wheezing. Rescue 18 g 0    amitriptyline (ELAVIL) 10 MG tablet TAKE 1 TABLET (10 MG TOTAL) BY MOUTH EVERY EVENING. 30 tablet 8    blood sugar diagnostic Strp 1 strip by Misc.(Non-Drug; Combo Route) route once daily. 100 strip 3    FREESTYLE LANCETS 28 gauge lancets 1 LANCET BY MISC.(NON-DRUG COMBO ROUTE) ROUTE ONCE DAILY.  3    lancets Misc 1 lancet by Misc.(Non-Drug; Combo Route) route once daily. 100 each 3    mupirocin calcium 2% (BACTROBAN) 2 % cream Apply topically 3 (three) times daily. 1 g 0    tolterodine (DETROL LA) 4 MG 24 hr capsule Take 1 capsule (4 mg total) by mouth once daily. 90 capsule 0     Current Facility-Administered Medications on File Prior to Visit   Medication Dose Route Frequency Provider Last Rate Last Dose    sodium chloride 0.9% bolus 1,000 mL  1,000 mL Intravenous Once Arlette Chavez MD           Allergies   Review of patient's allergies indicates:   Allergen Reactions    Amlodipine Nausea And Vomiting     Other reaction(s): Unknown    Amlodipine-benazepril Itching    Azithromycin      Stomach pain, and cramping    Benazepril Nausea And Vomiting     Other reaction(s): Unknown    Hydrocodone      Other reaction(s): Unknown    Hydrocodone-acetaminophen Hives and Itching         ROS  Review of Systems   Constitutional: Positive for chills, fatigue and fever.   HENT: Positive for congestion. Negative for ear pain, postnasal drip, rhinorrhea, sinus pressure, sinus pain, sneezing and sore throat.    Eyes: Negative for photophobia and itching.   Respiratory: Negative for cough, chest tightness, shortness of breath and wheezing.    Cardiovascular: Negative  "for chest pain and palpitations.   Gastrointestinal: Positive for diarrhea and nausea. Negative for abdominal pain, constipation and vomiting.   Genitourinary: Negative for dysuria and flank pain.   Musculoskeletal: Positive for myalgias. Negative for arthralgias.   Skin: Negative for rash.   Neurological: Positive for headaches. Negative for light-headedness.   Hematological: Negative for adenopathy.   Psychiatric/Behavioral: Negative for sleep disturbance. The patient is not nervous/anxious.          Objective:    /86   Pulse 84   Temp 99.7 °F (37.6 °C) (Oral)   Ht 5' 9" (1.753 m)   Wt 131.7 kg (290 lb 5.5 oz)   SpO2 96%   BMI 42.88 kg/m²     Physical Exam   Constitutional: Vital signs are normal.   HENT:   Head: Normocephalic.   Right Ear: Hearing, tympanic membrane, external ear and ear canal normal. Tympanic membrane is not bulging. No middle ear effusion.   Left Ear: Hearing, tympanic membrane, external ear and ear canal normal. Tympanic membrane is not bulging.  No middle ear effusion.   Nose: Mucosal edema and rhinorrhea present. Right sinus exhibits no maxillary sinus tenderness and no frontal sinus tenderness. Left sinus exhibits no maxillary sinus tenderness and no frontal sinus tenderness.   Mouth/Throat: Uvula is midline, oropharynx is clear and moist and mucous membranes are normal. No oropharyngeal exudate, posterior oropharyngeal edema or posterior oropharyngeal erythema.   Eyes: Pupils are equal, round, and reactive to light. Conjunctivae, EOM and lids are normal.   Neck: Neck supple. Carotid bruit is not present. No thyroid mass present.   Cardiovascular: Normal rate, regular rhythm, S1 normal and S2 normal. Exam reveals no gallop and no friction rub.   No murmur heard.  Pulmonary/Chest: Effort normal and breath sounds normal. She has no wheezes. She has no rales.   Abdominal: Soft. Normal appearance and bowel sounds are normal. There is no hepatosplenomegaly. There is generalized " tenderness. There is no rigidity, no rebound and no guarding.   Lymphadenopathy:     She has no cervical adenopathy.        Right: No supraclavicular adenopathy present.        Left: No supraclavicular adenopathy present.   Neurological: She is alert.   Skin: Skin is warm, dry and intact. No rash noted.   Psychiatric: She has a normal mood and affect. Judgment normal.       Assessment/Plan:  Kalee Dupree is a 48 y.o. female who presents today for :    Kalee was seen today for diarrhea, fever, headache, generalized body aches and nasal congestion.    Diagnoses and all orders for this visit:    Viral gastroenteritis  -     ondansetron (ZOFRAN) 4 MG tablet; Take 1 tablet (4 mg total) by mouth every 8 (eight) hours as needed for Nausea.  -     loperamide (IMODIUM) 2 mg capsule; Take 1 capsule (2 mg total) by mouth 3 (three) times daily as needed for Diarrhea.    Counseled patient on the clinical course of condition including symptomatology, treatment and prevention  Counseled regarding risks, benefits, and limitations of medications  Advised patient to seek urgent/emergent care if symptoms intensify  Sent patient with informational material about diagnosis  Patient gave verbal understanding and agreement of plan    Exposure to the flu  -     POCT Influenza A/B          Problem list issues addressed during this visit    Hypertensive heart disease with diastolic heart failure  Chronic and stable as reviewed in record, controlled with medication   followed by PCP  Continue current treatment plan             Health maintenance reviewed and disussed, deferred at this time due to acute illness, will  Follow up with PCP      I spent >50% of this 25 minute encounter counseling the patient on diagnoses, risk factors, and treatment.         Encouraged to call/return to clinic if symptoms persist or worsen    Lotus Saba PA-C  [facility]/ Peds

## 2019-03-28 NOTE — ASSESSMENT & PLAN NOTE
Chronic and stable as reviewed in record, controlled with medication   followed by PCP  Continue current treatment plan

## 2019-04-02 ENCOUNTER — OFFICE VISIT (OUTPATIENT)
Dept: PODIATRY | Facility: CLINIC | Age: 49
End: 2019-04-02
Payer: COMMERCIAL

## 2019-04-02 VITALS — WEIGHT: 290 LBS | HEIGHT: 69 IN | BODY MASS INDEX: 42.95 KG/M2

## 2019-04-02 DIAGNOSIS — E11.49 TYPE II DIABETES MELLITUS WITH NEUROLOGICAL MANIFESTATIONS: ICD-10-CM

## 2019-04-02 DIAGNOSIS — L97.522 SKIN ULCER OF LEFT FOOT WITH FAT LAYER EXPOSED: Primary | ICD-10-CM

## 2019-04-02 PROCEDURE — 11042 WOUND DEBRIDEMENT: ICD-10-PCS | Mod: S$GLB,,, | Performed by: PODIATRIST

## 2019-04-02 PROCEDURE — 99999 PR PBB SHADOW E&M-EST. PATIENT-LVL IV: CPT | Mod: PBBFAC,,, | Performed by: PODIATRIST

## 2019-04-02 PROCEDURE — 11042 DBRDMT SUBQ TIS 1ST 20SQCM/<: CPT | Mod: S$GLB,,, | Performed by: PODIATRIST

## 2019-04-02 PROCEDURE — 3008F PR BODY MASS INDEX (BMI) DOCUMENTED: ICD-10-PCS | Mod: CPTII,S$GLB,, | Performed by: PODIATRIST

## 2019-04-02 PROCEDURE — 99213 PR OFFICE/OUTPT VISIT, EST, LEVL III, 20-29 MIN: ICD-10-PCS | Mod: 25,S$GLB,, | Performed by: PODIATRIST

## 2019-04-02 PROCEDURE — 3044F HG A1C LEVEL LT 7.0%: CPT | Mod: CPTII,S$GLB,, | Performed by: PODIATRIST

## 2019-04-02 PROCEDURE — 99999 PR PBB SHADOW E&M-EST. PATIENT-LVL IV: ICD-10-PCS | Mod: PBBFAC,,, | Performed by: PODIATRIST

## 2019-04-02 PROCEDURE — 99213 OFFICE O/P EST LOW 20 MIN: CPT | Mod: 25,S$GLB,, | Performed by: PODIATRIST

## 2019-04-02 PROCEDURE — 3008F BODY MASS INDEX DOCD: CPT | Mod: CPTII,S$GLB,, | Performed by: PODIATRIST

## 2019-04-02 PROCEDURE — 3044F PR MOST RECENT HEMOGLOBIN A1C LEVEL <7.0%: ICD-10-PCS | Mod: CPTII,S$GLB,, | Performed by: PODIATRIST

## 2019-04-08 NOTE — PROCEDURES
Wound Debridement  Date/Time: 4/2/2019 5:00 PM  Performed by: Karen Carter DPM  Authorized by: Karen Carter DPM       Wound Details:    Location:  Left foot    Location:  Left 1st Metatarsal Head    Type of Debridement:  Excisional       Length (cm):  0.5       Area (sq cm):  0.25       Width (cm):  0.5       Percent Debrided (%):  100       Depth (cm):  0.1       Total Area Debrided (sq cm):  0.25    Depth of debridement:  Subcutaneous tissue    Tissue debrided:  Dermis    Devitalized tissue debrided:  Biofilm and Callus    Instruments:  Blade and Curette    Bleeding:  Minimal  Hemostasis Achieved: Yes    Method Used:  Pressure  Patient tolerance:  Patient tolerated the procedure well with no immediate complications     [place], football

## 2019-04-08 NOTE — PROGRESS NOTES
Subjective:      Patient ID: Kalee Dupree is a 48 y.o. female.    Chief Complaint: Foot Pain (left foot pain (PCP Dr Chavez)) and Foot Problem    Kalee is a 48 y.o. female who presents to the clinic for evaluation and treatment of high risk feet. Kalee has a past medical history of Allergy, Anemia, Anxiety, Depression, Diabetes mellitus, type 2, Focal segmental glomerulosclerosis, Hyperlipidemia, Hypertension, Lumbar disc disease, Morbid obesity, FADIA on CPAP, Proteinuria, and Retinal artery occlusion. The patient's chief complaint is long, thick toenails that are difficult for her to trim. Often causes bleeding when she trims her nails. Has pain in both feet but attributes this to her diabetic neuropathy and low back pain/disc problem and sciatica. Takes gabapentin which helps ease the pain most of the time. Patient is here to have comprehensive DM foot exam and to establish care per recommendations of PCP. Inquiring about DM shoes and has general questions regarding her foot health and DM. Also has generalized pain in hammertoes. This patient has documented high risk feet requiring routine maintenance secondary to diabetes mellitis and those secondary complications of diabetes, as mentioned..    2/7/19: Presents with left foot pain x one month. Denies trauma to area. Reports radiating pain to dorsal foot. Also reports worsening bunion deformity left foot. Previously followed by Dr. Chance plummer to me.     4/2/19: F/u left foot pain. Reports wearing CAM boot. [place] and developed a blister to the medial left foot x 2 weeks. States she was cleaning the area and applying neosporin.     PCP: Arlette Chavez MD    Date Last Seen by PCP:   Chief Complaint   Patient presents with    Foot Pain     left foot pain (PCP Dr Chavez)    Foot Problem       Current shoe gear:   Slip-on shoes         Hemoglobin A1C   Date Value Ref Range Status   01/24/2019 5.2 4.0 - 5.6 % Final     Comment:      ADA Screening Guidelines:  5.7-6.4%  Consistent with prediabetes  >or=6.5%  Consistent with diabetes  High levels of fetal hemoglobin interfere with the HbA1C  assay. Heterozygous hemoglobin variants (HbS, HgC, etc)do  not significantly interfere with this assay.   However, presence of multiple variants may affect accuracy.     08/20/2018 5.4 4.0 - 5.6 % Final     Comment:     ADA Screening Guidelines:  5.7-6.4%  Consistent with prediabetes  >or=6.5%  Consistent with diabetes  High levels of fetal hemoglobin interfere with the HbA1C  assay. Heterozygous hemoglobin variants (HbS, HgC, etc)do  not significantly interfere with this assay.   However, presence of multiple variants may affect accuracy.     09/18/2017 7.2 (H) 4.0 - 5.6 % Final     Comment:     According to ADA guidelines, hemoglobin A1c <7.0% represents  optimal control in non-pregnant diabetic patients. Different  metrics may apply to specific patient populations.   Standards of Medical Care in Diabetes-2016.  For the purpose of screening for the presence of diabetes:  <5.7%     Consistent with the absence of diabetes  5.7-6.4%  Consistent with increasing risk for diabetes   (prediabetes)  >or=6.5%  Consistent with diabetes  Currently, no consensus exists for use of hemoglobin A1c  for diagnosis of diabetes for children.  This Hemoglobin A1c assay has significant interference with fetal   hemoglobin   (HbF). The results are invalid for patients with abnormal amounts of   HbF,   including those with known Hereditary Persistence   of Fetal Hemoglobin. Heterozygous hemoglobin variants (HbAS, HbAC,   HbAD, HbAE, HbA2) do not significantly interfere with this assay;   however, presence of multiple variants in a sample may impact the %   interference.       Past Medical History:   Diagnosis Date    Allergy     Anemia     Anxiety     Depression     Diabetes mellitus, type 2     Focal segmental glomerulosclerosis     Hyperlipidemia     Hypertension      Lumbar disc disease     Morbid obesity     FADIA on CPAP     Proteinuria     Retinal artery occlusion        Past Surgical History:   Procedure Laterality Date     SECTION       SECTION      CHOLECYSTECTOMY      DILATION AND CURETTAGE OF UTERUS      EYE SURGERY         Family History   Problem Relation Age of Onset    Cancer Mother 56        colon    Diabetes Mother     Hyperlipidemia Mother     Hypertension Mother     Obesity Mother     Kidney disease Mother     Thyroid disease Mother     Colon cancer Mother     Cancer Father         lung    Hypertension Father     Diabetes Brother     Hypertension Brother     Heart disease Paternal Grandfather        Social History     Socioeconomic History    Marital status:      Spouse name: Not on file    Number of children: 1    Years of education: Not on file    Highest education level: Not on file   Occupational History     Employer: [employer]   Social Needs    Financial resource strain: Not on file    Food insecurity:     Worry: Not on file     Inability: Not on file    Transportation needs:     Medical: Not on file     Non-medical: Not on file   Tobacco Use    Smoking status: Former Smoker     Packs/day: 0.50     Years: 7.00     Pack years: 3.50    Smokeless tobacco: Never Used    Tobacco comment: Quit .   Substance and Sexual Activity    Alcohol use: Yes     Comment: Rare.     Drug use: No    Sexual activity: Yes     Partners: Male   Lifestyle    Physical activity:     Days per week: Not on file     Minutes per session: Not on file    Stress: Not on file   Relationships    Social connections:     Talks on phone: Not on file     Gets together: Not on file     Attends Muslim service: Not on file     Active member of club or organization: Not on file     Attends meetings of clubs or organizations: Not on file     Relationship status: Not on file   Other Topics Concern    Not on file   Social  History Narrative    Several family members on mother side with aneurysms heart and aorta       Current Outpatient Medications   Medication Sig Dispense Refill    albuterol 90 mcg/actuation inhaler Inhale 2 puffs into the lungs every 6 (six) hours as needed for Wheezing. Rescue 18 g 0    blood sugar diagnostic Strp 1 strip by Misc.(Non-Drug; Combo Route) route once daily. 100 strip 3    buPROPion (WELLBUTRIN XL) 150 MG TB24 tablet Take 1 tablet (150 mg total) by mouth once daily. 30 tablet 3    citalopram (CELEXA) 40 MG tablet TAKE 1 TABLET BY MOUTH EVERY DAY 30 tablet 0    fluticasone (FLONASE) 50 mcg/actuation nasal spray 1 spray by Each Nare route once daily. 16 g 1    FREESTYLE LANCETS 28 gauge lancets 1 LANCET BY MISC.(NON-DRUG COMBO ROUTE) ROUTE ONCE DAILY.  3    hydroCHLOROthiazide (HYDRODIURIL) 25 MG tablet Take 1 tablet (25 mg total) by mouth once daily. 90 tablet 1    lancets Misc 1 lancet by Misc.(Non-Drug; Combo Route) route once daily. 100 each 3    loperamide (IMODIUM) 2 mg capsule Take 1 capsule (2 mg total) by mouth 3 (three) times daily as needed for Diarrhea. 30 capsule 0    metoprolol succinate (TOPROL-XL) 50 MG 24 hr tablet Take 1 tablet (50 mg total) by mouth once daily. 90 tablet 3    mupirocin calcium 2% (BACTROBAN) 2 % cream Apply topically 3 (three) times daily. 1 g 0    ondansetron (ZOFRAN) 4 MG tablet Take 1 tablet (4 mg total) by mouth every 8 (eight) hours as needed for Nausea. 30 tablet 0    tolterodine (DETROL LA) 4 MG 24 hr capsule Take 1 capsule (4 mg total) by mouth once daily. 90 capsule 0    amitriptyline (ELAVIL) 10 MG tablet TAKE 1 TABLET (10 MG TOTAL) BY MOUTH EVERY EVENING. 30 tablet 8     Current Facility-Administered Medications   Medication Dose Route Frequency Provider Last Rate Last Dose    sodium chloride 0.9% bolus 1,000 mL  1,000 mL Intravenous Once Arlette Chavez MD           Review of patient's allergies indicates:   Allergen Reactions    Amlodipine       Other reaction(s): Unknown    Azithromycin      Stomach pain, and cramping    Benazepril      Other reaction(s): Unknown    Hydrocodone      Other reaction(s): Unknown    Lotrel  [amlodipine-benazepril] Itching    Vicodin  [hydrocodone-acetaminophen] Hives and Itching       Review of Systems   Constitution: Negative for chills and fever.   Cardiovascular: Positive for leg swelling. Negative for chest pain and claudication.   Respiratory: Negative for cough and shortness of breath.    Skin: Positive for dry skin and nail changes.   Musculoskeletal:        Toe pain   Gastrointestinal: Negative for nausea and vomiting.   Neurological: Positive for numbness, paresthesias and sensory change.   Psychiatric/Behavioral: Negative for altered mental status.           Objective:      Physical Exam   Constitutional: She is oriented to person, place, and time. She appears well-developed and well-nourished.   HENT:   Head: Normocephalic.   Cardiovascular: Intact distal pulses.   Pulses:       Dorsalis pedis pulses are 1+ on the right side, and 1+ on the left side.        Posterior tibial pulses are 1+ on the right side, and 1+ on the left side.   CRT < 3 sec to tips of toes. ++ edema noted to b/l LE. Mild spider veins and vericosities noted to b/l LEs.      Pulmonary/Chest: No respiratory distress.   Musculoskeletal:   Gastrocnemius equinus noted to b/l ankles with decreased DF noted on exam. MMT 5/5 in DF/PF/Inv/Ev resistance with no reproduction of pain in any direction. Passive range of motion of ankle and pedal joints is painless. Adequate pedal joint ROM.     Semi-rigid hammertoe contractures noted to toes 2-4 b/l-asymptomatic. Plantarflexed 1st ray bilateral with pes cavus foot type b/l.     Mild pain upon palpation left dorsal foot. With pain radiating distally           Patient has hammertoes of digits 2-5 bilateral partially reducible without symptom today.     Visible and palpable bunion with pain at  dorsomedial 1st metatarsal head left .  Hallux abducted right and left partially reducible, tracks laterally without being track bound.  No ecchymosis, erythema, edema, or cardinal signs infection or signs of trauma same foot.         Feet:   Right Foot:   Protective Sensation: 10 sites tested. 7 sites sensed.   Left Foot:   Protective Sensation: 10 sites tested. 7 sites sensed.   Neurological: She is alert and oriented to person, place, and time. She has normal strength. A sensory deficit is present.   Light touch, proprioception, and sharp/dull sensation are all intact bilaterally. Protective threshold with the [place]stein monofilament is diminished at toes bilaterally. Vibratory sensation diminished b/l distal foot. Subjective paresthesias with no clearly identifiable source or trigger.      Skin: Skin is warm, dry and intact. No erythema.   No open lesions, lacerations or wounds noted. Nails are thickened, elongated, discolored yellow/brown with subungual debris and brittleness to R 1-5 and L 1-5. Interdigital spaces clean, dry and intact b/l. No erythema noted to b/l foot. Skin texture thin, atrophic, dry. Pedal hair diminished. Toes cool to touch. Hyperkeratotic tissue noted to distal tips of toes 2 and 3 bilateral.     Superficial abrasion right medial leg. No purulent drainage noted.    Psychiatric: She has a normal mood and affect. Her behavior is normal. Judgment and thought content normal.   Vitals reviewed.    Wound 1: Left medial foot  Measurement: 0.3cmx0.3cmx0.1cm  pre debridement  0.5cmx0.5cmx0.1cm post debridement.  Base:granular base   Periwound skin: callus   Drainage: none  Erythema: mild  Probe: none, no bone exposed  Pre debridement     Post debridement                     Assessment:       Encounter Diagnoses   Name Primary?    Skin ulcer of left foot with fat layer exposed Yes    Type II diabetes mellitus with neurological manifestations          Plan:       Kalee was seen today for  foot pain and foot problem.    Diagnoses and all orders for this visit:    Skin ulcer of left foot with fat layer exposed  -     Wound Debridement    Type II diabetes mellitus with neurological manifestations      I counseled the patient on her conditions, their implications and medical management.      Debridement:see procedure note   Dressings:meredith   Offloading:felt pad football.     Follow-up:Patient is to return to the clinic in one week for follow-up but should call [facility] immediately if any signs of infection, such as fever, chills, sweats, increased redness or pain.    Short-term goals include maintaining good offloading and minimizing bioburden, promoting granulation and epithelialization to healing.  Long-term goals include keeping the wound healed by good offloading and medical management under the direction of internist.      F/u one week    Karen Carter DPM

## 2019-04-09 ENCOUNTER — HOSPITAL ENCOUNTER (OUTPATIENT)
Dept: RADIOLOGY | Facility: HOSPITAL | Age: 49
Discharge: HOME | End: 2019-04-09
Attending: PODIATRIST
Payer: COMMERCIAL

## 2019-04-09 ENCOUNTER — OFFICE VISIT (OUTPATIENT)
Dept: PODIATRY | Facility: CLINIC | Age: 49
End: 2019-04-09
Payer: COMMERCIAL

## 2019-04-09 VITALS — WEIGHT: 290 LBS | BODY MASS INDEX: 42.95 KG/M2 | HEIGHT: 69 IN

## 2019-04-09 DIAGNOSIS — E11.49 TYPE II DIABETES MELLITUS WITH NEUROLOGICAL MANIFESTATIONS: ICD-10-CM

## 2019-04-09 DIAGNOSIS — L97.522 FOOT ULCERATION, LEFT, WITH FAT LAYER EXPOSED: Primary | ICD-10-CM

## 2019-04-09 DIAGNOSIS — L97.522 FOOT ULCERATION, LEFT, WITH FAT LAYER EXPOSED: ICD-10-CM

## 2019-04-09 PROCEDURE — 87075 CULTR BACTERIA EXCEPT BLOOD: CPT

## 2019-04-09 PROCEDURE — 73630 XR FOOT COMPLETE 3 VIEW LEFT: ICD-10-PCS | Mod: 26,LT,, | Performed by: RADIOLOGY

## 2019-04-09 PROCEDURE — 99999 PR PBB SHADOW E&M-EST. PATIENT-LVL III: CPT | Mod: PBBFAC,,, | Performed by: PODIATRIST

## 2019-04-09 PROCEDURE — 73630 X-RAY EXAM OF FOOT: CPT | Mod: TC,FY,PO,LT

## 2019-04-09 PROCEDURE — 73630 X-RAY EXAM OF FOOT: CPT | Mod: 26,LT,, | Performed by: RADIOLOGY

## 2019-04-09 PROCEDURE — 11042 DBRDMT SUBQ TIS 1ST 20SQCM/<: CPT | Mod: S$GLB,,, | Performed by: PODIATRIST

## 2019-04-09 PROCEDURE — 3044F PR MOST RECENT HEMOGLOBIN A1C LEVEL <7.0%: ICD-10-PCS | Mod: CPTII,S$GLB,, | Performed by: PODIATRIST

## 2019-04-09 PROCEDURE — 11042 WOUND DEBRIDEMENT: ICD-10-PCS | Mod: S$GLB,,, | Performed by: PODIATRIST

## 2019-04-09 PROCEDURE — 99999 PR PBB SHADOW E&M-EST. PATIENT-LVL III: ICD-10-PCS | Mod: PBBFAC,,, | Performed by: PODIATRIST

## 2019-04-09 PROCEDURE — 3044F HG A1C LEVEL LT 7.0%: CPT | Mod: CPTII,S$GLB,, | Performed by: PODIATRIST

## 2019-04-09 PROCEDURE — 87147 CULTURE TYPE IMMUNOLOGIC: CPT

## 2019-04-09 PROCEDURE — 3008F PR BODY MASS INDEX (BMI) DOCUMENTED: ICD-10-PCS | Mod: CPTII,S$GLB,, | Performed by: PODIATRIST

## 2019-04-09 PROCEDURE — 3008F BODY MASS INDEX DOCD: CPT | Mod: CPTII,S$GLB,, | Performed by: PODIATRIST

## 2019-04-09 PROCEDURE — 99213 OFFICE O/P EST LOW 20 MIN: CPT | Mod: 25,S$GLB,, | Performed by: PODIATRIST

## 2019-04-09 PROCEDURE — 87070 CULTURE OTHR SPECIMN AEROBIC: CPT

## 2019-04-09 PROCEDURE — 99213 PR OFFICE/OUTPT VISIT, EST, LEVL III, 20-29 MIN: ICD-10-PCS | Mod: 25,S$GLB,, | Performed by: PODIATRIST

## 2019-04-09 RX ORDER — AMOXICILLIN AND CLAVULANATE POTASSIUM 875; 125 MG/1; MG/1
1 TABLET, FILM COATED ORAL EVERY 12 HOURS
Qty: 20 TABLET | Refills: 0 | Status: SHIPPED | OUTPATIENT
Start: 2019-04-09 | End: 2019-10-11

## 2019-04-12 LAB — BACTERIA SPEC AEROBE CULT: NORMAL

## 2019-04-12 NOTE — PROGRESS NOTES
Subjective:      Patient ID: Kalee Dupree is a 48 y.o. female.    Chief Complaint: Wound Care (left foot bunion/ puss came out dressing smelled of urine  (PCP Dr Chavez 01/24/19))    Kalee is a 48 y.o. female who presents to the clinic for evaluation and treatment of high risk feet. Kalee has a past medical history of Allergy, Anemia, Anxiety, Depression, Diabetes mellitus, type 2, Focal segmental glomerulosclerosis, Hyperlipidemia, Hypertension, Lumbar disc disease, Morbid obesity, FADIA on CPAP, Proteinuria, and Retinal artery occlusion. The patient's chief complaint is long, thick toenails that are difficult for her to trim. Often causes bleeding when she trims her nails. Has pain in both feet but attributes this to her diabetic neuropathy and low back pain/disc problem and sciatica. Takes gabapentin which helps ease the pain most of the time. Patient is here to have comprehensive DM foot exam and to establish care per recommendations of PCP. Inquiring about DM shoes and has general questions regarding her foot health and DM. Also has generalized pain in hammertoes. This patient has documented high risk feet requiring routine maintenance secondary to diabetes mellitis and those secondary complications of diabetes, as mentioned..    2/7/19: Presents with left foot pain x one month. Denies trauma to area. Reports radiating pain to dorsal foot. Also reports worsening bunion deformity left foot. Previously followed by Dr. Chance plummer to me.     4/2/19: F/u left foot pain. Reports wearing CAM boot. [place] and developed a blister to the medial left foot x 2 weeks. States she was cleaning the area and applying neosporin.     4/9/19: F/u left foot ulceration. Bandages noted to be wet with smell of urine. Patient unsure if she got the bandage wet although does state it was raining a lot the past couple of days and could have got on the bandage. Denies N/V/F/C.     PCP: Arletet Chavez,  MD    Date Last Seen by PCP:   Chief Complaint   Patient presents with    Wound Care     left foot bunion/ puss came out dressing smelled of urine  (PCP Dr Chavez 01/24/19)       Current shoe gear:   Slip-on shoes         Hemoglobin A1C   Date Value Ref Range Status   01/24/2019 5.2 4.0 - 5.6 % Final     Comment:     ADA Screening Guidelines:  5.7-6.4%  Consistent with prediabetes  >or=6.5%  Consistent with diabetes  High levels of fetal hemoglobin interfere with the HbA1C  assay. Heterozygous hemoglobin variants (HbS, HgC, etc)do  not significantly interfere with this assay.   However, presence of multiple variants may affect accuracy.     08/20/2018 5.4 4.0 - 5.6 % Final     Comment:     ADA Screening Guidelines:  5.7-6.4%  Consistent with prediabetes  >or=6.5%  Consistent with diabetes  High levels of fetal hemoglobin interfere with the HbA1C  assay. Heterozygous hemoglobin variants (HbS, HgC, etc)do  not significantly interfere with this assay.   However, presence of multiple variants may affect accuracy.     09/18/2017 7.2 (H) 4.0 - 5.6 % Final     Comment:     According to ADA guidelines, hemoglobin A1c <7.0% represents  optimal control in non-pregnant diabetic patients. Different  metrics may apply to specific patient populations.   Standards of Medical Care in Diabetes-2016.  For the purpose of screening for the presence of diabetes:  <5.7%     Consistent with the absence of diabetes  5.7-6.4%  Consistent with increasing risk for diabetes   (prediabetes)  >or=6.5%  Consistent with diabetes  Currently, no consensus exists for use of hemoglobin A1c  for diagnosis of diabetes for children.  This Hemoglobin A1c assay has significant interference with fetal   hemoglobin   (HbF). The results are invalid for patients with abnormal amounts of   HbF,   including those with known Hereditary Persistence   of Fetal Hemoglobin. Heterozygous hemoglobin variants (HbAS, HbAC,   HbAD, HbAE, HbA2) do not significantly  interfere with this assay;   however, presence of multiple variants in a sample may impact the %   interference.       Past Medical History:   Diagnosis Date    Allergy     Anemia     Anxiety     Depression     Diabetes mellitus, type 2     Focal segmental glomerulosclerosis     Hyperlipidemia     Hypertension     Lumbar disc disease     Morbid obesity     FADIA on CPAP     Proteinuria     Retinal artery occlusion        Past Surgical History:   Procedure Laterality Date     SECTION       SECTION      CHOLECYSTECTOMY      DILATION AND CURETTAGE OF UTERUS      EYE SURGERY         Family History   Problem Relation Age of Onset    Cancer Mother 56        colon    Diabetes Mother     Hyperlipidemia Mother     Hypertension Mother     Obesity Mother     Kidney disease Mother     Thyroid disease Mother     Colon cancer Mother     Cancer Father         lung    Hypertension Father     Diabetes Brother     Hypertension Brother     Heart disease Paternal Grandfather        Social History     Socioeconomic History    Marital status:      Spouse name: Not on file    Number of children: 1    Years of education: Not on file    Highest education level: Not on file   Occupational History     Employer: [employer]   Social Needs    Financial resource strain: Not on file    Food insecurity:     Worry: Not on file     Inability: Not on file    Transportation needs:     Medical: Not on file     Non-medical: Not on file   Tobacco Use    Smoking status: Former Smoker     Packs/day: 0.50     Years: 7.00     Pack years: 3.50    Smokeless tobacco: Never Used    Tobacco comment: Quit .   Substance and Sexual Activity    Alcohol use: Yes     Comment: Rare.     Drug use: No    Sexual activity: Yes     Partners: Male   Lifestyle    Physical activity:     Days per week: Not on file     Minutes per session: Not on file    Stress: Not on file   Relationships     Social connections:     Talks on phone: Not on file     Gets together: Not on file     Attends Worship service: Not on file     Active member of club or organization: Not on file     Attends meetings of clubs or organizations: Not on file     Relationship status: Not on file   Other Topics Concern    Not on file   Social History Narrative    Several family members on mother side with aneurysms heart and aorta       Current Outpatient Medications   Medication Sig Dispense Refill    albuterol 90 mcg/actuation inhaler Inhale 2 puffs into the lungs every 6 (six) hours as needed for Wheezing. Rescue 18 g 0    blood sugar diagnostic Strp 1 strip by Misc.(Non-Drug; Combo Route) route once daily. 100 strip 3    buPROPion (WELLBUTRIN XL) 150 MG TB24 tablet Take 1 tablet (150 mg total) by mouth once daily. 30 tablet 3    citalopram (CELEXA) 40 MG tablet TAKE 1 TABLET BY MOUTH EVERY DAY 30 tablet 0    fluticasone (FLONASE) 50 mcg/actuation nasal spray 1 spray by Each Nare route once daily. 16 g 1    FREESTYLE LANCETS 28 gauge lancets 1 LANCET BY MISC.(NON-DRUG COMBO ROUTE) ROUTE ONCE DAILY.  3    hydroCHLOROthiazide (HYDRODIURIL) 25 MG tablet Take 1 tablet (25 mg total) by mouth once daily. 90 tablet 1    lancets Misc 1 lancet by Misc.(Non-Drug; Combo Route) route once daily. 100 each 3    metoprolol succinate (TOPROL-XL) 50 MG 24 hr tablet Take 1 tablet (50 mg total) by mouth once daily. 90 tablet 3    mupirocin calcium 2% (BACTROBAN) 2 % cream Apply topically 3 (three) times daily. 1 g 0    tolterodine (DETROL LA) 4 MG 24 hr capsule Take 1 capsule (4 mg total) by mouth once daily. 90 capsule 0    amitriptyline (ELAVIL) 10 MG tablet TAKE 1 TABLET (10 MG TOTAL) BY MOUTH EVERY EVENING. 30 tablet 8    amoxicillin-clavulanate 875-125mg (AUGMENTIN) 875-125 mg per tablet Take 1 tablet by mouth every 12 (twelve) hours. 20 tablet 0     Current Facility-Administered Medications   Medication Dose Route Frequency  Provider Last Rate Last Dose    sodium chloride 0.9% bolus 1,000 mL  1,000 mL Intravenous Once Arlette Chavez MD           Review of patient's allergies indicates:   Allergen Reactions    Amlodipine      Other reaction(s): Unknown    Azithromycin      Stomach pain, and cramping    Benazepril      Other reaction(s): Unknown    Hydrocodone      Other reaction(s): Unknown    Lotrel  [amlodipine-benazepril] Itching    Vicodin  [hydrocodone-acetaminophen] Hives and Itching       Review of Systems   Constitution: Negative for chills and fever.   Cardiovascular: Positive for leg swelling. Negative for chest pain and claudication.   Respiratory: Negative for cough and shortness of breath.    Skin: Positive for dry skin and nail changes.   Musculoskeletal:        Toe pain   Gastrointestinal: Negative for nausea and vomiting.   Neurological: Positive for numbness, paresthesias and sensory change.   Psychiatric/Behavioral: Negative for altered mental status.           Objective:      Physical Exam   Constitutional: She is oriented to person, place, and time. She appears well-developed and well-nourished.   HENT:   Head: Normocephalic.   Cardiovascular: Intact distal pulses.   Pulses:       Dorsalis pedis pulses are 1+ on the right side, and 1+ on the left side.        Posterior tibial pulses are 1+ on the right side, and 1+ on the left side.   CRT < 3 sec to tips of toes. ++ edema noted to b/l LE. Mild spider veins and vericosities noted to b/l LEs.      Pulmonary/Chest: No respiratory distress.   Musculoskeletal:   Gastrocnemius equinus noted to b/l ankles with decreased DF noted on exam. MMT 5/5 in DF/PF/Inv/Ev resistance with no reproduction of pain in any direction. Passive range of motion of ankle and pedal joints is painless. Adequate pedal joint ROM.     Semi-rigid hammertoe contractures noted to toes 2-4 b/l-asymptomatic. Plantarflexed 1st ray bilateral with pes cavus foot type b/l.     Mild pain upon palpation  left dorsal foot. With pain radiating distally           Patient has hammertoes of digits 2-5 bilateral partially reducible without symptom today.     Visible and palpable bunion with pain at dorsomedial 1st metatarsal head left .  Hallux abducted right and left partially reducible, tracks laterally without being track bound.  No ecchymosis, erythema, edema, or cardinal signs infection or signs of trauma same foot.         Feet:   Right Foot:   Protective Sensation: 10 sites tested. 7 sites sensed.   Left Foot:   Protective Sensation: 10 sites tested. 7 sites sensed.   Neurological: She is alert and oriented to person, place, and time. She has normal strength. A sensory deficit is present.   Light touch, proprioception, and sharp/dull sensation are all intact bilaterally. Protective threshold with the Sherman-Wienstein monofilament is diminished at toes bilaterally. Vibratory sensation diminished b/l distal foot. Subjective paresthesias with no clearly identifiable source or trigger.      Skin: Skin is warm, dry and intact. No erythema.   No open lesions, lacerations or wounds noted. Nails are thickened, elongated, discolored yellow/brown with subungual debris and brittleness to R 1-5 and L 1-5. Interdigital spaces clean, dry and intact b/l. No erythema noted to b/l foot. Skin texture thin, atrophic, dry. Pedal hair diminished. Toes cool to touch. Hyperkeratotic tissue noted to distal tips of toes 2 and 3 bilateral.     Superficial abrasion right medial leg. No purulent drainage noted.    Psychiatric: She has a normal mood and affect. Her behavior is normal. Judgment and thought content normal.   Vitals reviewed.    Wound 1: Left medial foot  Measurement: 0.3cmx0.3cmx0.1cm  pre debridement  0.5cmx0.5cmx0.1cm post debridement.  Base:granular base   Periwound skin: callus   Drainage: none  Erythema: mild  Probe: none, no bone exposed  Pre debridement 4/9/19                  Post debridement                      Assessment:       Encounter Diagnoses   Name Primary?    Type II diabetes mellitus with neurological manifestations     Foot ulceration, left, with fat layer exposed Yes         Plan:       Kalee was seen today for wound care.    Diagnoses and all orders for this visit:    Foot ulceration, left, with fat layer exposed  -     X-Ray Foot Complete Left; Future  -     Aerobic culture (Specify Source)  -     CULTURE, ANAEROBE    Type II diabetes mellitus with neurological manifestations    Other orders  -     amoxicillin-clavulanate 875-125mg (AUGMENTIN) 875-125 mg per tablet; Take 1 tablet by mouth every 12 (twelve) hours.      I counseled the patient on her conditions, their implications and medical management.      Debridement:see procedure note   Dressings: betadine   Offloading:felt pad bandaid    Will defer football this week due to previous wet bandage.     Aerobic, anaerobic cultures obtained.      Left foot xray to assess underlying deformity and for underlying osseous pathology.     Prescription for augmentin sent to pharmacy.       Follow-up:Patient is to return to the clinic in one week for follow-up but should call [facility] immediately if any signs of infection, such as fever, chills, sweats, increased redness or pain.    Short-term goals include maintaining good offloading and minimizing bioburden, promoting granulation and epithelialization to healing.  Long-term goals include keeping the wound healed by good offloading and medical management under the direction of internist.      F/u one week    Karen Carter DPM

## 2019-04-12 NOTE — PROCEDURES
Wound Debridement  Date/Time: 4/9/2019 3:00 PM  Performed by: Karen Carter DPM  Authorized by: Karen Carter DPM     Consent Done?:  Yes (Verbal)    Wound Details:    Location:  Left foot    Location:  Left 1st Metatarsal Head    Type of Debridement:  Excisional       Length (cm):  0.5       Area (sq cm):  0.25       Width (cm):  0.5       Percent Debrided (%):  100       Depth (cm):  0.1       Total Area Debrided (sq cm):  0.25    Depth of debridement:  Subcutaneous tissue    Tissue debrided:  Dermis    Devitalized tissue debrided:  Biofilm and Callus    Instruments:  Blade and Curette    Bleeding:  Minimal  Hemostasis Achieved: Yes    Method Used:  Pressure  Patient tolerance:  Patient tolerated the procedure well with no immediate complications

## 2019-04-15 LAB — BACTERIA SPEC ANAEROBE CULT: NORMAL

## 2019-04-16 ENCOUNTER — OFFICE VISIT (OUTPATIENT)
Dept: PODIATRY | Facility: CLINIC | Age: 49
End: 2019-04-16
Payer: COMMERCIAL

## 2019-04-16 VITALS — BODY MASS INDEX: 42.95 KG/M2 | HEIGHT: 69 IN | WEIGHT: 290 LBS

## 2019-04-16 DIAGNOSIS — L97.522 FOOT ULCERATION, LEFT, WITH FAT LAYER EXPOSED: ICD-10-CM

## 2019-04-16 DIAGNOSIS — E11.49 TYPE II DIABETES MELLITUS WITH NEUROLOGICAL MANIFESTATIONS: Primary | ICD-10-CM

## 2019-04-16 PROCEDURE — 99499 UNLISTED E&M SERVICE: CPT | Mod: S$GLB,,, | Performed by: PODIATRIST

## 2019-04-16 PROCEDURE — 99499 NO LOS: ICD-10-PCS | Mod: S$GLB,,, | Performed by: PODIATRIST

## 2019-04-16 PROCEDURE — 99999 PR PBB SHADOW E&M-EST. PATIENT-LVL III: ICD-10-PCS | Mod: PBBFAC,,, | Performed by: PODIATRIST

## 2019-04-16 PROCEDURE — 97597 DBRDMT OPN WND 1ST 20 CM/<: CPT | Mod: S$GLB,,, | Performed by: PODIATRIST

## 2019-04-16 PROCEDURE — 99999 PR PBB SHADOW E&M-EST. PATIENT-LVL III: CPT | Mod: PBBFAC,,, | Performed by: PODIATRIST

## 2019-04-16 PROCEDURE — 97597 WOUND DEBRIDEMENT: ICD-10-PCS | Mod: S$GLB,,, | Performed by: PODIATRIST

## 2019-04-22 NOTE — PROCEDURES
Wound Debridement  Date/Time: 4/16/2019 4:30 PM  Performed by: Karen Carter DPM  Authorized by: Karen Carter DPM     Consent Done?:  Yes (Verbal)    Wound Details:    Location:  Left foot    Location:  Left 1st Metatarsal Head    Type of Debridement:  Excisional       Length (cm):  0.3       Area (sq cm):  0.09       Width (cm):  0.3       Percent Debrided (%):  100       Depth (cm):  0.1       Total Area Debrided (sq cm):  0.09    Depth of debridement:  Epidermis/Dermis    Tissue debrided:  Dermis    Devitalized tissue debrided:  Biofilm and Callus    Instruments:  Curette    Bleeding:  Minimal  Hemostasis Achieved: Yes    Method Used:  Pressure  Patient tolerance:  Patient tolerated the procedure well with no immediate complications

## 2019-04-22 NOTE — PROGRESS NOTES
Subjective:      Patient ID: Kalee Dupree is a 48 y.o. female.    Chief Complaint: Wound Care (left foot (PCP Dr Chavez))    Kalee is a 48 y.o. female who presents to the clinic for evaluation and treatment of high risk feet. Kalee has a past medical history of Allergy, Anemia, Anxiety, Depression, Diabetes mellitus, type 2, Focal segmental glomerulosclerosis, Hyperlipidemia, Hypertension, Lumbar disc disease, Morbid obesity, FADIA on CPAP, Proteinuria, and Retinal artery occlusion. The patient's chief complaint is long, thick toenails that are difficult for her to trim. Often causes bleeding when she trims her nails. Has pain in both feet but attributes this to her diabetic neuropathy and low back pain/disc problem and sciatica. Takes gabapentin which helps ease the pain most of the time. Patient is here to have comprehensive DM foot exam and to establish care per recommendations of PCP. Inquiring about DM shoes and has general questions regarding her foot health and DM. Also has generalized pain in hammertoes. This patient has documented high risk feet requiring routine maintenance secondary to diabetes mellitis and those secondary complications of diabetes, as mentioned..    2/7/19: Presents with left foot pain x one month. Denies trauma to area. Reports radiating pain to dorsal foot. Also reports worsening bunion deformity left foot. Previously followed by Dr. Chance plummer to me.     4/2/19: F/u left foot pain. Reports wearing CAM boot. [place] and developed a blister to the medial left foot x 2 weeks. States she was cleaning the area and applying neosporin.     4/9/19: F/u left foot ulceration. Bandages noted to be wet with smell of urine. Patient unsure if she got the bandage wet although does state it was raining a lot the past couple of days and could have got on the bandage. Denies N/V/F/C.     4/16/19: F/u left foot ulceration. Reports applying betadine and triple abx to  wound. No new issues.     PCP: Arlette Chavez MD    Date Last Seen by PCP:   Chief Complaint   Patient presents with    Wound Care     left foot (PCP Dr Chavez)       Current shoe gear:   Slip-on shoes         Hemoglobin A1C   Date Value Ref Range Status   01/24/2019 5.2 4.0 - 5.6 % Final     Comment:     ADA Screening Guidelines:  5.7-6.4%  Consistent with prediabetes  >or=6.5%  Consistent with diabetes  High levels of fetal hemoglobin interfere with the HbA1C  assay. Heterozygous hemoglobin variants (HbS, HgC, etc)do  not significantly interfere with this assay.   However, presence of multiple variants may affect accuracy.     08/20/2018 5.4 4.0 - 5.6 % Final     Comment:     ADA Screening Guidelines:  5.7-6.4%  Consistent with prediabetes  >or=6.5%  Consistent with diabetes  High levels of fetal hemoglobin interfere with the HbA1C  assay. Heterozygous hemoglobin variants (HbS, HgC, etc)do  not significantly interfere with this assay.   However, presence of multiple variants may affect accuracy.     09/18/2017 7.2 (H) 4.0 - 5.6 % Final     Comment:     According to ADA guidelines, hemoglobin A1c <7.0% represents  optimal control in non-pregnant diabetic patients. Different  metrics may apply to specific patient populations.   Standards of Medical Care in Diabetes-2016.  For the purpose of screening for the presence of diabetes:  <5.7%     Consistent with the absence of diabetes  5.7-6.4%  Consistent with increasing risk for diabetes   (prediabetes)  >or=6.5%  Consistent with diabetes  Currently, no consensus exists for use of hemoglobin A1c  for diagnosis of diabetes for children.  This Hemoglobin A1c assay has significant interference with fetal   hemoglobin   (HbF). The results are invalid for patients with abnormal amounts of   HbF,   including those with known Hereditary Persistence   of Fetal Hemoglobin. Heterozygous hemoglobin variants (HbAS, HbAC,   HbAD, HbAE, HbA2) do not significantly interfere with this  assay;   however, presence of multiple variants in a sample may impact the %   interference.       Past Medical History:   Diagnosis Date    Allergy     Anemia     Anxiety     Depression     Diabetes mellitus, type 2     Focal segmental glomerulosclerosis     Hyperlipidemia     Hypertension     Lumbar disc disease     Morbid obesity     FADIA on CPAP     Proteinuria     Retinal artery occlusion        Past Surgical History:   Procedure Laterality Date     SECTION       SECTION      CHOLECYSTECTOMY      DILATION AND CURETTAGE OF UTERUS      EYE SURGERY         Family History   Problem Relation Age of Onset    Cancer Mother 56        colon    Diabetes Mother     Hyperlipidemia Mother     Hypertension Mother     Obesity Mother     Kidney disease Mother     Thyroid disease Mother     Colon cancer Mother     Cancer Father         lung    Hypertension Father     Diabetes Brother     Hypertension Brother     Heart disease Paternal Grandfather        Social History     Socioeconomic History    Marital status:      Spouse name: Not on file    Number of children: 1    Years of education: Not on file    Highest education level: Not on file   Occupational History     Employer: [employer]   Social Needs    Financial resource strain: Not on file    Food insecurity:     Worry: Not on file     Inability: Not on file    Transportation needs:     Medical: Not on file     Non-medical: Not on file   Tobacco Use    Smoking status: Former Smoker     Packs/day: 0.50     Years: 7.00     Pack years: 3.50    Smokeless tobacco: Never Used    Tobacco comment: Quit .   Substance and Sexual Activity    Alcohol use: Yes     Comment: Rare.     Drug use: No    Sexual activity: Yes     Partners: Male   Lifestyle    Physical activity:     Days per week: Not on file     Minutes per session: Not on file    Stress: Not on file   Relationships    Social connections:      Talks on phone: Not on file     Gets together: Not on file     Attends Evangelical service: Not on file     Active member of club or organization: Not on file     Attends meetings of clubs or organizations: Not on file     Relationship status: Not on file   Other Topics Concern    Not on file   Social History Narrative    Several family members on mother side with aneurysms heart and aorta       Current Outpatient Medications   Medication Sig Dispense Refill    albuterol 90 mcg/actuation inhaler Inhale 2 puffs into the lungs every 6 (six) hours as needed for Wheezing. Rescue 18 g 0    amoxicillin-clavulanate 875-125mg (AUGMENTIN) 875-125 mg per tablet Take 1 tablet by mouth every 12 (twelve) hours. 20 tablet 0    blood sugar diagnostic Strp 1 strip by Misc.(Non-Drug; Combo Route) route once daily. 100 strip 3    buPROPion (WELLBUTRIN XL) 150 MG TB24 tablet Take 1 tablet (150 mg total) by mouth once daily. 30 tablet 3    citalopram (CELEXA) 40 MG tablet TAKE 1 TABLET BY MOUTH EVERY DAY 30 tablet 0    fluticasone (FLONASE) 50 mcg/actuation nasal spray 1 spray by Each Nare route once daily. 16 g 1    FREESTYLE LANCETS 28 gauge lancets 1 LANCET BY MISC.(NON-DRUG COMBO ROUTE) ROUTE ONCE DAILY.  3    hydroCHLOROthiazide (HYDRODIURIL) 25 MG tablet Take 1 tablet (25 mg total) by mouth once daily. 90 tablet 1    lancets Misc 1 lancet by Misc.(Non-Drug; Combo Route) route once daily. 100 each 3    metoprolol succinate (TOPROL-XL) 50 MG 24 hr tablet Take 1 tablet (50 mg total) by mouth once daily. 90 tablet 3    mupirocin calcium 2% (BACTROBAN) 2 % cream Apply topically 3 (three) times daily. 1 g 0    tolterodine (DETROL LA) 4 MG 24 hr capsule Take 1 capsule (4 mg total) by mouth once daily. 90 capsule 0    amitriptyline (ELAVIL) 10 MG tablet TAKE 1 TABLET (10 MG TOTAL) BY MOUTH EVERY EVENING. 30 tablet 8     Current Facility-Administered Medications   Medication Dose Route Frequency Provider Last Rate Last Dose     sodium chloride 0.9% bolus 1,000 mL  1,000 mL Intravenous Once Arlette Chavez MD           Review of patient's allergies indicates:   Allergen Reactions    Amlodipine      Other reaction(s): Unknown    Azithromycin      Stomach pain, and cramping    Benazepril      Other reaction(s): Unknown    Hydrocodone      Other reaction(s): Unknown    Lotrel  [amlodipine-benazepril] Itching    Vicodin  [hydrocodone-acetaminophen] Hives and Itching       Review of Systems   Constitution: Negative for chills and fever.   Cardiovascular: Positive for leg swelling. Negative for chest pain and claudication.   Respiratory: Negative for cough and shortness of breath.    Skin: Positive for dry skin and nail changes.   Musculoskeletal:        Toe pain   Gastrointestinal: Negative for nausea and vomiting.   Neurological: Positive for numbness, paresthesias and sensory change.   Psychiatric/Behavioral: Negative for altered mental status.           Objective:      Physical Exam   Constitutional: She is oriented to person, place, and time. She appears well-developed and well-nourished.   HENT:   Head: Normocephalic.   Cardiovascular: Intact distal pulses.   Pulses:       Dorsalis pedis pulses are 1+ on the right side, and 1+ on the left side.        Posterior tibial pulses are 1+ on the right side, and 1+ on the left side.   CRT < 3 sec to tips of toes. ++ edema noted to b/l LE. Mild spider veins and vericosities noted to b/l LEs.      Pulmonary/Chest: No respiratory distress.   Musculoskeletal:   Gastrocnemius equinus noted to b/l ankles with decreased DF noted on exam. MMT 5/5 in DF/PF/Inv/Ev resistance with no reproduction of pain in any direction. Passive range of motion of ankle and pedal joints is painless. Adequate pedal joint ROM.     Semi-rigid hammertoe contractures noted to toes 2-4 b/l-asymptomatic. Plantarflexed 1st ray bilateral with pes cavus foot type b/l.     Mild pain upon palpation left dorsal foot. With pain  radiating distally           Patient has hammertoes of digits 2-5 bilateral partially reducible without symptom today.     Visible and palpable bunion with pain at dorsomedial 1st metatarsal head left .  Hallux abducted right and left partially reducible, tracks laterally without being track bound.  No ecchymosis, erythema, edema, or cardinal signs infection or signs of trauma same foot.         Feet:   Right Foot:   Protective Sensation: 10 sites tested. 7 sites sensed.   Left Foot:   Protective Sensation: 10 sites tested. 7 sites sensed.   Neurological: She is alert and oriented to person, place, and time. She has normal strength. A sensory deficit is present.   Light touch, proprioception, and sharp/dull sensation are all intact bilaterally. Protective threshold with the Bloomery-Wienstein monofilament is diminished at toes bilaterally. Vibratory sensation diminished b/l distal foot. Subjective paresthesias with no clearly identifiable source or trigger.      Skin: Skin is warm, dry and intact. No erythema.   No open lesions, lacerations or wounds noted. Nails are thickened, elongated, discolored yellow/brown with subungual debris and brittleness to R 1-5 and L 1-5. Interdigital spaces clean, dry and intact b/l. No erythema noted to b/l foot. Skin texture thin, atrophic, dry. Pedal hair diminished. Toes cool to touch. Hyperkeratotic tissue noted to distal tips of toes 2 and 3 bilateral.     Superficial abrasion right medial leg. No purulent drainage noted.    Psychiatric: She has a normal mood and affect. Her behavior is normal. Judgment and thought content normal.   Vitals reviewed.      4/16/19          Wound 1: Left medial foot  Measurement: 0.1cmx0.1cmx0.1cm  pre debridement  0.3cmx0.3cmx0.1cm post debridement.  Base:granular base   Periwound skin: callus   Drainage: none  Erythema: mild  Probe: none, no bone exposed      Pre debridement 4/9/19                  Post debridement                     Assessment:        Encounter Diagnoses   Name Primary?    Type II diabetes mellitus with neurological manifestations Yes    Foot ulceration, left, with fat layer exposed          Plan:       Kalee was seen today for wound care.    Diagnoses and all orders for this visit:    Type II diabetes mellitus with neurological manifestations  -     Wound Debridement    Foot ulceration, left, with fat layer exposed  -     Wound Debridement      I counseled the patient on her conditions, their implications and medical management.      Debridement:see procedure note   Dressings: betadine   Offloading:felt pad bandaid    Instructed to apply betadine only, instructed to stop applying triple abx in addition to betadine.     Will defer football this week due to previous wet bandage.     Aerobic, anaerobic cultures obtained.      Left foot xray to assess underlying deformity and for underlying osseous pathology. - reviewed     Prescription for augmentin sent to pharmacy. - completed       Follow-up:Patient is to return to the clinic in one week for follow-up but should call [facility] immediately if any signs of infection, such as fever, chills, sweats, increased redness or pain.    Short-term goals include maintaining good offloading and minimizing bioburden, promoting granulation and epithelialization to healing.  Long-term goals include keeping the wound healed by good offloading and medical management under the direction of internist.      F/u one week    Karen Carter DPM

## 2019-06-23 DIAGNOSIS — F41.8 DEPRESSION WITH ANXIETY: ICD-10-CM

## 2019-06-23 RX ORDER — BUPROPION HYDROCHLORIDE 150 MG/1
TABLET ORAL
Qty: 30 TABLET | Refills: 3 | Status: SHIPPED | OUTPATIENT
Start: 2019-06-23 | End: 2019-10-20 | Stop reason: SDUPTHER

## 2019-08-02 DIAGNOSIS — E11.9 TYPE 2 DIABETES MELLITUS WITHOUT COMPLICATION: ICD-10-CM

## 2019-08-23 DIAGNOSIS — E11.9 TYPE 2 DIABETES MELLITUS WITHOUT COMPLICATION: ICD-10-CM

## 2019-10-20 DIAGNOSIS — F41.8 DEPRESSION WITH ANXIETY: ICD-10-CM

## 2019-10-20 RX ORDER — BUPROPION HYDROCHLORIDE 150 MG/1
TABLET ORAL
Qty: 30 TABLET | Refills: 3 | Status: SHIPPED | OUTPATIENT
Start: 2019-10-20 | End: 2020-01-15

## 2019-11-05 ENCOUNTER — PATIENT MESSAGE (OUTPATIENT)
Dept: CARDIOLOGY | Facility: CLINIC | Age: 49
End: 2019-11-05

## 2019-11-05 DIAGNOSIS — I50.30 HYPERTENSIVE HEART DISEASE WITH DIASTOLIC HEART FAILURE: ICD-10-CM

## 2019-11-05 DIAGNOSIS — I11.0 HYPERTENSIVE HEART DISEASE WITH DIASTOLIC HEART FAILURE: ICD-10-CM

## 2019-11-05 RX ORDER — METOPROLOL SUCCINATE 50 MG/1
50 TABLET, EXTENDED RELEASE ORAL DAILY
Qty: 90 TABLET | Refills: 3 | Status: SHIPPED | OUTPATIENT
Start: 2019-11-05 | End: 2020-10-12

## 2020-01-06 DIAGNOSIS — E11.9 TYPE 2 DIABETES MELLITUS WITHOUT COMPLICATION, WITHOUT LONG-TERM CURRENT USE OF INSULIN: ICD-10-CM

## 2020-01-15 DIAGNOSIS — F41.8 DEPRESSION WITH ANXIETY: ICD-10-CM

## 2020-01-15 RX ORDER — BUPROPION HYDROCHLORIDE 150 MG/1
TABLET ORAL
Qty: 90 TABLET | Refills: 1 | Status: SHIPPED | OUTPATIENT
Start: 2020-01-15 | End: 2020-07-06

## 2020-04-03 DIAGNOSIS — Z12.39 BREAST CANCER SCREENING: ICD-10-CM

## 2020-05-12 ENCOUNTER — PATIENT MESSAGE (OUTPATIENT)
Dept: ADMINISTRATIVE | Facility: HOSPITAL | Age: 50
End: 2020-05-12

## 2020-07-29 ENCOUNTER — PATIENT OUTREACH (OUTPATIENT)
Dept: ADMINISTRATIVE | Facility: HOSPITAL | Age: 50
End: 2020-07-29

## 2020-10-12 ENCOUNTER — OFFICE VISIT (OUTPATIENT)
Dept: CARDIOLOGY | Facility: CLINIC | Age: 50
End: 2020-10-12
Payer: COMMERCIAL

## 2020-10-12 DIAGNOSIS — E66.01 MORBID OBESITY WITH BMI OF 45.0-49.9, ADULT: ICD-10-CM

## 2020-10-12 DIAGNOSIS — R07.89 CHEST PAIN, ATYPICAL: ICD-10-CM

## 2020-10-12 DIAGNOSIS — G47.33 OSA ON CPAP: Primary | ICD-10-CM

## 2020-10-12 DIAGNOSIS — E11.9 TYPE 2 DIABETES MELLITUS WITHOUT COMPLICATION, WITHOUT LONG-TERM CURRENT USE OF INSULIN: ICD-10-CM

## 2020-10-12 PROCEDURE — 99213 PR OFFICE/OUTPT VISIT, EST, LEVL III, 20-29 MIN: ICD-10-PCS | Mod: 95,,, | Performed by: INTERNAL MEDICINE

## 2020-10-12 PROCEDURE — 99213 OFFICE O/P EST LOW 20 MIN: CPT | Mod: 95,,, | Performed by: INTERNAL MEDICINE

## 2020-10-12 RX ORDER — DILTIAZEM HYDROCHLORIDE 180 MG/1
180 CAPSULE, COATED, EXTENDED RELEASE ORAL DAILY
Qty: 30 CAPSULE | Refills: 11 | Status: SHIPPED | OUTPATIENT
Start: 2020-10-12 | End: 2021-02-17 | Stop reason: SDUPTHER

## 2020-10-12 NOTE — PROGRESS NOTES
Subjective:    Patient ID:  Kalee Dupree is a 49 y.o. female who presents for follow-up of Hypertension      HPI     CP, FADIA on CPAP, Obesity, HTN, s/p bariatric surgery 3/2018     DSE 2/27/18    1 - Normal left ventricular systolic function (EF 55-60%).     No evidence of stress induced myocardial ischemia.      Echo 3/11/14  1 - Low normal left ventricular systolic function (EF 50-55%).   2 - Eccentric hypertrophy.   3 - Mild left ventricular enlargement.   4 - Left atrial enlargement.   5 - Left ventricular diastolic dysfunction.   6 - Right ventricular enlargement with normal systolic function.   7 - Mild aortic regurgitation.   8 - Trivial mitral regurgitation.   9 - Trivial tricuspid regurgitation.       Records from  admission 11/14/14 show C  EDP 18, EF 60%, normal coronaries  At that time peak troponin was 1.4     Saw Dr miguel 8/20/18  Kalee Dupree is a 47 y.o. female with multiple medical diagnoses as listed in the medical history and problem list that presents for evaluation for dizziness and passing out which occurred Friday night. S/p gastric bypass March 2018 by Dr. Vargas. Has passed out before 7 years ago due to her iron levels. She takes in 600 calories daily. Has some intermittent vomiting. She was standing at the counter of a store when she started to feel light headed and nauseous, she was speaking to the woman at the counter and she heard her voice change and she felt like she was going to faint. She has a hx of tachycardia and does not recall palpitations. She was down for several seconds. She landed on her buttocks. She has not hit her head but has been having a headache. She is having dizziness. She has been drinking four 16 oz bottles, Saturday she had four bites of shrimp and grits and yesterday she had redfish and grits 7 or 8 bites.      EKG 8/20/18 NST 1st degree AVB otherwise ok. 1st degree AVB new from EKG 1/15/18  Suffering with an URI      10/12/20 Audio visit. Reports  developing hives after taking metoprolol. BP/HR have been stable  Denies CP. Stable DAY  Recently lost her mother    Review of Systems   Constitution: Negative for decreased appetite.   HENT: Negative for ear discharge.    Eyes: Negative for blurred vision.   Respiratory: Negative for hemoptysis.    Endocrine: Negative for polyphagia.   Hematologic/Lymphatic: Negative for adenopathy.   Skin: Negative for color change.   Musculoskeletal: Negative for joint swelling.   Genitourinary: Negative for bladder incontinence.   Neurological: Negative for brief paralysis.   Psychiatric/Behavioral: Negative for hallucinations.   Allergic/Immunologic: Negative for hives.        Objective:    Physical Exam    Audio call  Assessment:       1. FADIA on CPAP    2. Chest pain, atypical    3. Type 2 diabetes mellitus without complication, without long-term current use of insulin    4. Morbid obesity with BMI of 45.0-49.9, adult         Plan:       Stop metoprolol due to hives  Start cardizem 180 qd  OV 3 months with EKG

## 2020-11-03 ENCOUNTER — LAB VISIT (OUTPATIENT)
Dept: LAB | Facility: HOSPITAL | Age: 50
End: 2020-11-03
Attending: FAMILY MEDICINE
Payer: COMMERCIAL

## 2020-11-03 DIAGNOSIS — Z00.00 WELLNESS EXAMINATION: ICD-10-CM

## 2020-11-03 DIAGNOSIS — Z00.00 ANNUAL PHYSICAL EXAM: ICD-10-CM

## 2020-11-03 LAB
25(OH)D3+25(OH)D2 SERPL-MCNC: 8 NG/ML (ref 30–96)
ALBUMIN SERPL BCP-MCNC: 3.8 G/DL (ref 3.5–5.2)
ALP SERPL-CCNC: 86 U/L (ref 55–135)
ALT SERPL W/O P-5'-P-CCNC: 18 U/L (ref 10–44)
ANION GAP SERPL CALC-SCNC: 12 MMOL/L (ref 8–16)
AST SERPL-CCNC: 16 U/L (ref 10–40)
BASOPHILS # BLD AUTO: 0.08 K/UL (ref 0–0.2)
BASOPHILS NFR BLD: 0.9 % (ref 0–1.9)
BILIRUB SERPL-MCNC: 0.5 MG/DL (ref 0.1–1)
BUN SERPL-MCNC: 14 MG/DL (ref 6–20)
CALCIUM SERPL-MCNC: 9.3 MG/DL (ref 8.7–10.5)
CHLORIDE SERPL-SCNC: 108 MMOL/L (ref 95–110)
CHOLEST SERPL-MCNC: 209 MG/DL (ref 120–199)
CHOLEST/HDLC SERPL: 3.9 {RATIO} (ref 2–5)
CO2 SERPL-SCNC: 24 MMOL/L (ref 23–29)
CREAT SERPL-MCNC: 1.1 MG/DL (ref 0.5–1.4)
DIFFERENTIAL METHOD: NORMAL
EOSINOPHIL # BLD AUTO: 0.2 K/UL (ref 0–0.5)
EOSINOPHIL NFR BLD: 2.2 % (ref 0–8)
ERYTHROCYTE [DISTWIDTH] IN BLOOD BY AUTOMATED COUNT: 13.1 % (ref 11.5–14.5)
EST. GFR  (AFRICAN AMERICAN): >60 ML/MIN/1.73 M^2
EST. GFR  (NON AFRICAN AMERICAN): 59.1 ML/MIN/1.73 M^2
ESTIMATED AVG GLUCOSE: 100 MG/DL (ref 68–131)
GLUCOSE SERPL-MCNC: 86 MG/DL (ref 70–110)
HBA1C MFR BLD HPLC: 5.1 % (ref 4–5.6)
HCT VFR BLD AUTO: 43.7 % (ref 37–48.5)
HDLC SERPL-MCNC: 53 MG/DL (ref 40–75)
HDLC SERPL: 25.4 % (ref 20–50)
HGB BLD-MCNC: 14 G/DL (ref 12–16)
IMM GRANULOCYTES # BLD AUTO: 0.04 K/UL (ref 0–0.04)
IMM GRANULOCYTES NFR BLD AUTO: 0.4 % (ref 0–0.5)
LDLC SERPL CALC-MCNC: 101.4 MG/DL (ref 63–159)
LYMPHOCYTES # BLD AUTO: 3.3 K/UL (ref 1–4.8)
LYMPHOCYTES NFR BLD: 35.4 % (ref 18–48)
MCH RBC QN AUTO: 29.5 PG (ref 27–31)
MCHC RBC AUTO-ENTMCNC: 32 G/DL (ref 32–36)
MCV RBC AUTO: 92 FL (ref 82–98)
MONOCYTES # BLD AUTO: 0.6 K/UL (ref 0.3–1)
MONOCYTES NFR BLD: 6.8 % (ref 4–15)
NEUTROPHILS # BLD AUTO: 5.1 K/UL (ref 1.8–7.7)
NEUTROPHILS NFR BLD: 54.3 % (ref 38–73)
NONHDLC SERPL-MCNC: 156 MG/DL
NRBC BLD-RTO: 0 /100 WBC
PLATELET # BLD AUTO: 223 K/UL (ref 150–350)
PMV BLD AUTO: 10.5 FL (ref 9.2–12.9)
POTASSIUM SERPL-SCNC: 4.2 MMOL/L (ref 3.5–5.1)
PROT SERPL-MCNC: 7 G/DL (ref 6–8.4)
RBC # BLD AUTO: 4.74 M/UL (ref 4–5.4)
SODIUM SERPL-SCNC: 144 MMOL/L (ref 136–145)
T3 SERPL-MCNC: 72 NG/DL (ref 60–180)
T4 FREE SERPL-MCNC: 0.84 NG/DL (ref 0.71–1.51)
TRIGL SERPL-MCNC: 273 MG/DL (ref 30–150)
TSH SERPL DL<=0.005 MIU/L-ACNC: 1.56 UIU/ML (ref 0.4–4)
WBC # BLD AUTO: 9.29 K/UL (ref 3.9–12.7)

## 2020-11-03 PROCEDURE — 83036 HEMOGLOBIN GLYCOSYLATED A1C: CPT

## 2020-11-03 PROCEDURE — 84443 ASSAY THYROID STIM HORMONE: CPT

## 2020-11-03 PROCEDURE — 85025 COMPLETE CBC W/AUTO DIFF WBC: CPT

## 2020-11-03 PROCEDURE — 36415 COLL VENOUS BLD VENIPUNCTURE: CPT

## 2020-11-03 PROCEDURE — 80053 COMPREHEN METABOLIC PANEL: CPT

## 2020-11-03 PROCEDURE — 80061 LIPID PANEL: CPT

## 2020-11-03 PROCEDURE — 82306 VITAMIN D 25 HYDROXY: CPT

## 2020-11-03 PROCEDURE — 84480 ASSAY TRIIODOTHYRONINE (T3): CPT

## 2020-11-03 PROCEDURE — 84439 ASSAY OF FREE THYROXINE: CPT

## 2020-11-08 ENCOUNTER — CLINICAL SUPPORT (OUTPATIENT)
Dept: URGENT CARE | Facility: CLINIC | Age: 50
End: 2020-11-08
Payer: COMMERCIAL

## 2020-11-08 DIAGNOSIS — Z11.9 ENCOUNTER FOR SCREENING EXAMINATION FOR INFECTIOUS DISEASE: Primary | ICD-10-CM

## 2020-11-08 LAB
CTP QC/QA: YES
SARS-COV-2 RDRP RESP QL NAA+PROBE: NEGATIVE

## 2020-11-08 PROCEDURE — U0002: ICD-10-PCS | Mod: QW,S$GLB,, | Performed by: PHYSICIAN ASSISTANT

## 2020-11-08 PROCEDURE — U0002 COVID-19 LAB TEST NON-CDC: HCPCS | Mod: QW,S$GLB,, | Performed by: PHYSICIAN ASSISTANT

## 2020-11-08 NOTE — LETTER
[address], [address] [place] [address]  Phone: [phone]  Fax: [phone]          Return to Work/School    Patient: Kalee Dupree  YOB: 1970   Date: 11/08/2020     To Whom It May Concern:     Kalee Dupree was in contact with/seen in my office on 11/08/2020. COVID-19 is present in our communities across the state. There is limited testing for COVID at this time, so not all patients can be tested. In this situation, your employee meets the following criteria:     Kalee Dupree has met the criteria for COVID-19 testing and has a NEGATIVE result. The employee can return to work once they are asymptomatic for 24 hours without the use of fever reducing medications (Tylenol, Motrin, etc).     If you have any questions or concerns, or if I can be of further assistance, please do not hesitate to contact me.     Sincerely,    NURSE URGENT CARE, [facility] [place]

## 2021-02-15 ENCOUNTER — HOSPITAL ENCOUNTER (OUTPATIENT)
Dept: RADIOLOGY | Facility: HOSPITAL | Age: 51
Discharge: HOME | End: 2021-02-15
Attending: PODIATRIST
Payer: COMMERCIAL

## 2021-02-15 ENCOUNTER — OFFICE VISIT (OUTPATIENT)
Dept: PODIATRY | Facility: CLINIC | Age: 51
End: 2021-02-15
Payer: COMMERCIAL

## 2021-02-15 VITALS
HEIGHT: 69 IN | WEIGHT: 289.88 LBS | SYSTOLIC BLOOD PRESSURE: 126 MMHG | DIASTOLIC BLOOD PRESSURE: 82 MMHG | BODY MASS INDEX: 42.94 KG/M2

## 2021-02-15 DIAGNOSIS — M21.612 BUNION, LEFT FOOT: ICD-10-CM

## 2021-02-15 DIAGNOSIS — L97.522 FOOT ULCERATION, LEFT, WITH FAT LAYER EXPOSED: ICD-10-CM

## 2021-02-15 DIAGNOSIS — E11.49 TYPE II DIABETES MELLITUS WITH NEUROLOGICAL MANIFESTATIONS: Primary | ICD-10-CM

## 2021-02-15 PROCEDURE — 87077 CULTURE AEROBIC IDENTIFY: CPT | Mod: 59

## 2021-02-15 PROCEDURE — 3079F DIAST BP 80-89 MM HG: CPT | Mod: CPTII,S$GLB,, | Performed by: PODIATRIST

## 2021-02-15 PROCEDURE — 73630 X-RAY EXAM OF FOOT: CPT | Mod: TC,FY,PO,LT

## 2021-02-15 PROCEDURE — 3044F PR MOST RECENT HEMOGLOBIN A1C LEVEL <7.0%: ICD-10-PCS | Mod: CPTII,S$GLB,, | Performed by: PODIATRIST

## 2021-02-15 PROCEDURE — 3079F PR MOST RECENT DIASTOLIC BLOOD PRESSURE 80-89 MM HG: ICD-10-PCS | Mod: CPTII,S$GLB,, | Performed by: PODIATRIST

## 2021-02-15 PROCEDURE — 73630 XR FOOT COMPLETE 3 VIEW LEFT: ICD-10-PCS | Mod: 26,LT,, | Performed by: RADIOLOGY

## 2021-02-15 PROCEDURE — 3074F PR MOST RECENT SYSTOLIC BLOOD PRESSURE < 130 MM HG: ICD-10-PCS | Mod: CPTII,S$GLB,, | Performed by: PODIATRIST

## 2021-02-15 PROCEDURE — 87075 CULTR BACTERIA EXCEPT BLOOD: CPT

## 2021-02-15 PROCEDURE — 87186 SC STD MICRODIL/AGAR DIL: CPT

## 2021-02-15 PROCEDURE — 3074F SYST BP LT 130 MM HG: CPT | Mod: CPTII,S$GLB,, | Performed by: PODIATRIST

## 2021-02-15 PROCEDURE — 73630 X-RAY EXAM OF FOOT: CPT | Mod: 26,LT,, | Performed by: RADIOLOGY

## 2021-02-15 PROCEDURE — 3008F BODY MASS INDEX DOCD: CPT | Mod: CPTII,S$GLB,, | Performed by: PODIATRIST

## 2021-02-15 PROCEDURE — 3044F HG A1C LEVEL LT 7.0%: CPT | Mod: CPTII,S$GLB,, | Performed by: PODIATRIST

## 2021-02-15 PROCEDURE — 87070 CULTURE OTHR SPECIMN AEROBIC: CPT

## 2021-02-15 PROCEDURE — 99214 PR OFFICE/OUTPT VISIT, EST, LEVL IV, 30-39 MIN: ICD-10-PCS | Mod: S$GLB,,, | Performed by: PODIATRIST

## 2021-02-15 PROCEDURE — 3008F PR BODY MASS INDEX (BMI) DOCUMENTED: ICD-10-PCS | Mod: CPTII,S$GLB,, | Performed by: PODIATRIST

## 2021-02-15 PROCEDURE — 99214 OFFICE O/P EST MOD 30 MIN: CPT | Mod: S$GLB,,, | Performed by: PODIATRIST

## 2021-02-15 PROCEDURE — 1125F PR PAIN SEVERITY QUANTIFIED, PAIN PRESENT: ICD-10-PCS | Mod: S$GLB,,, | Performed by: PODIATRIST

## 2021-02-15 PROCEDURE — 99999 PR PBB SHADOW E&M-EST. PATIENT-LVL IV: ICD-10-PCS | Mod: PBBFAC,,, | Performed by: PODIATRIST

## 2021-02-15 PROCEDURE — 99999 PR PBB SHADOW E&M-EST. PATIENT-LVL IV: CPT | Mod: PBBFAC,,, | Performed by: PODIATRIST

## 2021-02-15 PROCEDURE — 1125F AMNT PAIN NOTED PAIN PRSNT: CPT | Mod: S$GLB,,, | Performed by: PODIATRIST

## 2021-02-15 RX ORDER — DOXYCYCLINE 100 MG/1
100 CAPSULE ORAL 2 TIMES DAILY
Qty: 14 CAPSULE | Refills: 0 | Status: SHIPPED | OUTPATIENT
Start: 2021-02-15

## 2021-02-20 LAB
BACTERIA SPEC AEROBE CULT: ABNORMAL

## 2021-02-22 ENCOUNTER — OFFICE VISIT (OUTPATIENT)
Dept: PODIATRY | Facility: CLINIC | Age: 51
End: 2021-02-22
Payer: COMMERCIAL

## 2021-02-22 ENCOUNTER — OFFICE VISIT (OUTPATIENT)
Dept: ORTHOPEDICS | Facility: CLINIC | Age: 51
End: 2021-02-22
Payer: COMMERCIAL

## 2021-02-22 VITALS — WEIGHT: 289 LBS | HEIGHT: 69 IN | BODY MASS INDEX: 42.8 KG/M2

## 2021-02-22 DIAGNOSIS — L84 PRE-ULCERATIVE CALLUSES: ICD-10-CM

## 2021-02-22 DIAGNOSIS — E11.49 TYPE II DIABETES MELLITUS WITH NEUROLOGICAL MANIFESTATIONS: Primary | ICD-10-CM

## 2021-02-22 DIAGNOSIS — L97.522 FOOT ULCERATION, LEFT, WITH FAT LAYER EXPOSED: ICD-10-CM

## 2021-02-22 DIAGNOSIS — M20.42 HAMMERTOE OF LEFT FOOT: ICD-10-CM

## 2021-02-22 DIAGNOSIS — M20.12 HALLUX VALGUS, LEFT: Primary | ICD-10-CM

## 2021-02-22 LAB — BACTERIA SPEC ANAEROBE CULT: NORMAL

## 2021-02-22 PROCEDURE — 99999 PR PBB SHADOW E&M-EST. PATIENT-LVL III: ICD-10-PCS | Mod: PBBFAC,,, | Performed by: PODIATRIST

## 2021-02-22 PROCEDURE — 1125F AMNT PAIN NOTED PAIN PRSNT: CPT | Mod: S$GLB,,, | Performed by: ORTHOPAEDIC SURGERY

## 2021-02-22 PROCEDURE — 99999 PR PBB SHADOW E&M-EST. PATIENT-LVL II: ICD-10-PCS | Mod: PBBFAC,,, | Performed by: ORTHOPAEDIC SURGERY

## 2021-02-22 PROCEDURE — 99244 OFF/OP CNSLTJ NEW/EST MOD 40: CPT | Mod: S$GLB,,, | Performed by: ORTHOPAEDIC SURGERY

## 2021-02-22 PROCEDURE — 99244 PR OFFICE CONSULTATION,LEVEL IV: ICD-10-PCS | Mod: S$GLB,,, | Performed by: ORTHOPAEDIC SURGERY

## 2021-02-22 PROCEDURE — 99213 OFFICE O/P EST LOW 20 MIN: CPT | Mod: S$GLB,,, | Performed by: PODIATRIST

## 2021-02-22 PROCEDURE — 1125F PR PAIN SEVERITY QUANTIFIED, PAIN PRESENT: ICD-10-PCS | Mod: S$GLB,,, | Performed by: ORTHOPAEDIC SURGERY

## 2021-02-22 PROCEDURE — 1125F AMNT PAIN NOTED PAIN PRSNT: CPT | Mod: S$GLB,,, | Performed by: PODIATRIST

## 2021-02-22 PROCEDURE — 3044F PR MOST RECENT HEMOGLOBIN A1C LEVEL <7.0%: ICD-10-PCS | Mod: CPTII,S$GLB,, | Performed by: PODIATRIST

## 2021-02-22 PROCEDURE — 99999 PR PBB SHADOW E&M-EST. PATIENT-LVL II: CPT | Mod: PBBFAC,,, | Performed by: ORTHOPAEDIC SURGERY

## 2021-02-22 PROCEDURE — 3008F BODY MASS INDEX DOCD: CPT | Mod: CPTII,S$GLB,, | Performed by: PODIATRIST

## 2021-02-22 PROCEDURE — 99999 PR PBB SHADOW E&M-EST. PATIENT-LVL III: CPT | Mod: PBBFAC,,, | Performed by: PODIATRIST

## 2021-02-22 PROCEDURE — 3044F HG A1C LEVEL LT 7.0%: CPT | Mod: CPTII,S$GLB,, | Performed by: PODIATRIST

## 2021-02-22 PROCEDURE — 99213 PR OFFICE/OUTPT VISIT, EST, LEVL III, 20-29 MIN: ICD-10-PCS | Mod: S$GLB,,, | Performed by: PODIATRIST

## 2021-02-22 PROCEDURE — 1125F PR PAIN SEVERITY QUANTIFIED, PAIN PRESENT: ICD-10-PCS | Mod: S$GLB,,, | Performed by: PODIATRIST

## 2021-02-22 PROCEDURE — 3008F PR BODY MASS INDEX (BMI) DOCUMENTED: ICD-10-PCS | Mod: CPTII,S$GLB,, | Performed by: PODIATRIST

## 2021-02-25 ENCOUNTER — IMMUNIZATION (OUTPATIENT)
Dept: PHARMACY | Facility: CLINIC | Age: 51
End: 2021-02-25
Payer: COMMERCIAL

## 2021-02-25 DIAGNOSIS — Z23 NEED FOR VACCINATION: Primary | ICD-10-CM

## 2021-03-25 ENCOUNTER — IMMUNIZATION (OUTPATIENT)
Dept: PHARMACY | Facility: CLINIC | Age: 51
End: 2021-03-25
Payer: COMMERCIAL

## 2021-03-25 DIAGNOSIS — Z23 NEED FOR VACCINATION: Primary | ICD-10-CM

## 2021-04-23 ENCOUNTER — OFFICE VISIT (OUTPATIENT)
Dept: ORTHOPEDICS | Facility: CLINIC | Age: 51
End: 2021-04-23
Payer: COMMERCIAL

## 2021-04-23 DIAGNOSIS — Z01.818 PREOP EXAMINATION: ICD-10-CM

## 2021-04-23 DIAGNOSIS — E55.9 VITAMIN D DEFICIENCY: ICD-10-CM

## 2021-04-23 DIAGNOSIS — M20.12 HALLUX VALGUS, LEFT: Primary | ICD-10-CM

## 2021-04-23 DIAGNOSIS — M20.42 HAMMERTOE OF LEFT FOOT: ICD-10-CM

## 2021-04-23 PROCEDURE — 99999 PR PBB SHADOW E&M-EST. PATIENT-LVL III: CPT | Mod: PBBFAC,,, | Performed by: ORTHOPAEDIC SURGERY

## 2021-04-23 PROCEDURE — 1126F PR PAIN SEVERITY QUANTIFIED, NO PAIN PRESENT: ICD-10-PCS | Mod: S$GLB,,, | Performed by: ORTHOPAEDIC SURGERY

## 2021-04-23 PROCEDURE — 99214 OFFICE O/P EST MOD 30 MIN: CPT | Mod: S$GLB,,, | Performed by: ORTHOPAEDIC SURGERY

## 2021-04-23 PROCEDURE — 99214 PR OFFICE/OUTPT VISIT, EST, LEVL IV, 30-39 MIN: ICD-10-PCS | Mod: S$GLB,,, | Performed by: ORTHOPAEDIC SURGERY

## 2021-04-23 PROCEDURE — 1126F AMNT PAIN NOTED NONE PRSNT: CPT | Mod: S$GLB,,, | Performed by: ORTHOPAEDIC SURGERY

## 2021-04-23 PROCEDURE — 99999 PR PBB SHADOW E&M-EST. PATIENT-LVL III: ICD-10-PCS | Mod: PBBFAC,,, | Performed by: ORTHOPAEDIC SURGERY

## 2021-05-31 ENCOUNTER — ANESTHESIA EVENT (OUTPATIENT)
Dept: SURGERY | Facility: HOSPITAL | Age: 51
End: 2021-05-31
Payer: COMMERCIAL

## 2021-06-03 ENCOUNTER — HOSPITAL ENCOUNTER (OUTPATIENT)
Dept: CARDIOLOGY | Facility: CLINIC | Age: 51
Discharge: HOME | End: 2021-06-03
Payer: COMMERCIAL

## 2021-06-03 ENCOUNTER — OFFICE VISIT (OUTPATIENT)
Dept: ORTHOPEDICS | Facility: CLINIC | Age: 51
End: 2021-06-03
Payer: COMMERCIAL

## 2021-06-03 ENCOUNTER — HOSPITAL ENCOUNTER (OUTPATIENT)
Dept: RADIOLOGY | Facility: HOSPITAL | Age: 51
Discharge: HOME | End: 2021-06-03
Attending: FAMILY MEDICINE
Payer: COMMERCIAL

## 2021-06-03 VITALS — BODY MASS INDEX: 42.8 KG/M2 | WEIGHT: 289 LBS | HEIGHT: 69 IN

## 2021-06-03 VITALS — WEIGHT: 288.81 LBS | BODY MASS INDEX: 42.78 KG/M2 | HEIGHT: 69 IN

## 2021-06-03 DIAGNOSIS — M20.12 HALLUX VALGUS, LEFT: ICD-10-CM

## 2021-06-03 DIAGNOSIS — G89.18 POST-OP PAIN: ICD-10-CM

## 2021-06-03 DIAGNOSIS — Z01.818 PRE-OP EVALUATION: Primary | ICD-10-CM

## 2021-06-03 DIAGNOSIS — M20.42 HAMMERTOE OF LEFT FOOT: ICD-10-CM

## 2021-06-03 DIAGNOSIS — E55.9 VITAMIN D DEFICIENCY: ICD-10-CM

## 2021-06-03 DIAGNOSIS — Z12.39 BREAST CANCER SCREENING: ICD-10-CM

## 2021-06-03 PROCEDURE — 93010 ELECTROCARDIOGRAM REPORT: CPT | Mod: S$GLB,,, | Performed by: INTERNAL MEDICINE

## 2021-06-03 PROCEDURE — 77067 MAMMO DIGITAL SCREENING BILAT WITH TOMOSYNTHESIS_CAD: ICD-10-PCS | Mod: 26,,, | Performed by: RADIOLOGY

## 2021-06-03 PROCEDURE — 77063 MAMMO DIGITAL SCREENING BILAT WITH TOMOSYNTHESIS_CAD: ICD-10-PCS | Mod: 26,,, | Performed by: RADIOLOGY

## 2021-06-03 PROCEDURE — 3008F PR BODY MASS INDEX (BMI) DOCUMENTED: ICD-10-PCS | Mod: CPTII,S$GLB,, | Performed by: PHYSICIAN ASSISTANT

## 2021-06-03 PROCEDURE — 99499 NO LOS: ICD-10-PCS | Mod: S$GLB,,, | Performed by: PHYSICIAN ASSISTANT

## 2021-06-03 PROCEDURE — 99499 UNLISTED E&M SERVICE: CPT | Mod: S$GLB,,, | Performed by: PHYSICIAN ASSISTANT

## 2021-06-03 PROCEDURE — 77067 SCR MAMMO BI INCL CAD: CPT | Mod: 26,,, | Performed by: RADIOLOGY

## 2021-06-03 PROCEDURE — 93005 ELECTROCARDIOGRAM TRACING: CPT | Mod: S$GLB,,, | Performed by: ORTHOPAEDIC SURGERY

## 2021-06-03 PROCEDURE — 99999 PR PBB SHADOW E&M-EST. PATIENT-LVL III: ICD-10-PCS | Mod: PBBFAC,,, | Performed by: PHYSICIAN ASSISTANT

## 2021-06-03 PROCEDURE — 77063 BREAST TOMOSYNTHESIS BI: CPT | Mod: 26,,, | Performed by: RADIOLOGY

## 2021-06-03 PROCEDURE — 93005 EKG 12-LEAD: ICD-10-PCS | Mod: S$GLB,,, | Performed by: ORTHOPAEDIC SURGERY

## 2021-06-03 PROCEDURE — 3008F BODY MASS INDEX DOCD: CPT | Mod: CPTII,S$GLB,, | Performed by: PHYSICIAN ASSISTANT

## 2021-06-03 PROCEDURE — 99999 PR PBB SHADOW E&M-EST. PATIENT-LVL III: CPT | Mod: PBBFAC,,, | Performed by: PHYSICIAN ASSISTANT

## 2021-06-03 PROCEDURE — 77067 SCR MAMMO BI INCL CAD: CPT | Mod: TC

## 2021-06-03 PROCEDURE — 93010 EKG 12-LEAD: ICD-10-PCS | Mod: S$GLB,,, | Performed by: INTERNAL MEDICINE

## 2021-06-04 RX ORDER — ACETAMINOPHEN 500 MG
1000 TABLET ORAL EVERY 8 HOURS PRN
Qty: 90 TABLET | Refills: 0 | Status: SHIPPED | OUTPATIENT
Start: 2021-06-04

## 2021-06-04 RX ORDER — OXYCODONE HYDROCHLORIDE 5 MG/1
5 TABLET ORAL EVERY 4 HOURS PRN
Qty: 42 TABLET | Refills: 0 | Status: SHIPPED | OUTPATIENT
Start: 2021-06-04 | End: 2022-03-01 | Stop reason: ALTCHOICE

## 2021-06-07 ENCOUNTER — LAB VISIT (OUTPATIENT)
Dept: SPORTS MEDICINE | Facility: CLINIC | Age: 51
End: 2021-06-07
Payer: COMMERCIAL

## 2021-06-07 DIAGNOSIS — Z01.818 PREOP EXAMINATION: ICD-10-CM

## 2021-06-07 LAB — SARS-COV-2 RNA RESP QL NAA+PROBE: NOT DETECTED

## 2021-06-07 PROCEDURE — U0003 INFECTIOUS AGENT DETECTION BY NUCLEIC ACID (DNA OR RNA); SEVERE ACUTE RESPIRATORY SYNDROME CORONAVIRUS 2 (SARS-COV-2) (CORONAVIRUS DISEASE [COVID-19]), AMPLIFIED PROBE TECHNIQUE, MAKING USE OF HIGH THROUGHPUT TECHNOLOGIES AS DESCRIBED BY CMS-2020-01-R: HCPCS | Performed by: ORTHOPAEDIC SURGERY

## 2021-06-07 PROCEDURE — U0005 INFEC AGEN DETEC AMPLI PROBE: HCPCS | Performed by: ORTHOPAEDIC SURGERY

## 2021-06-08 ENCOUNTER — TELEPHONE (OUTPATIENT)
Dept: ORTHOPEDICS | Facility: CLINIC | Age: 51
End: 2021-06-08

## 2021-06-09 ENCOUNTER — NURSE TRIAGE (OUTPATIENT)
Dept: ADMINISTRATIVE | Facility: CLINIC | Age: 51
End: 2021-06-09

## 2021-06-09 ENCOUNTER — HOSPITAL ENCOUNTER (OUTPATIENT)
Facility: HOSPITAL | Age: 51
Discharge: HOME | End: 2021-06-09
Attending: ORTHOPAEDIC SURGERY | Admitting: ORTHOPAEDIC SURGERY
Payer: COMMERCIAL

## 2021-06-09 ENCOUNTER — ANESTHESIA (OUTPATIENT)
Dept: SURGERY | Facility: HOSPITAL | Age: 51
End: 2021-06-09
Payer: COMMERCIAL

## 2021-06-09 VITALS
HEIGHT: 69 IN | WEIGHT: 286.63 LBS | RESPIRATION RATE: 16 BRPM | OXYGEN SATURATION: 96 % | HEART RATE: 84 BPM | DIASTOLIC BLOOD PRESSURE: 83 MMHG | SYSTOLIC BLOOD PRESSURE: 134 MMHG | TEMPERATURE: 98 F | BODY MASS INDEX: 42.45 KG/M2

## 2021-06-09 DIAGNOSIS — M20.12 HALLUX VALGUS, LEFT: Primary | ICD-10-CM

## 2021-06-09 DIAGNOSIS — Z01.818 PREOP EXAMINATION: ICD-10-CM

## 2021-06-09 LAB
POCT GLUCOSE: 200 MG/DL (ref 70–110)
POCT GLUCOSE: 98 MG/DL (ref 70–110)

## 2021-06-09 PROCEDURE — C1769 GUIDE WIRE: HCPCS | Performed by: ORTHOPAEDIC SURGERY

## 2021-06-09 PROCEDURE — D9220A PRA ANESTHESIA: Mod: ,,, | Performed by: NURSE ANESTHETIST, CERTIFIED REGISTERED

## 2021-06-09 PROCEDURE — 27687 PR GASTROCNEMIUS RECESSION: ICD-10-PCS | Mod: 51,LT,, | Performed by: ORTHOPAEDIC SURGERY

## 2021-06-09 PROCEDURE — 28750 PR FUSION BIG TOE,MT-P JT: ICD-10-PCS | Mod: TA,,, | Performed by: ORTHOPAEDIC SURGERY

## 2021-06-09 PROCEDURE — 28112 PART REMOVAL OF METATARSAL: CPT | Mod: 51,T1,, | Performed by: ORTHOPAEDIC SURGERY

## 2021-06-09 PROCEDURE — 25000003 PHARM REV CODE 250: Performed by: ANESTHESIOLOGY

## 2021-06-09 PROCEDURE — 64450 NJX AA&/STRD OTHER PN/BRANCH: CPT | Mod: 59,LT,, | Performed by: ANESTHESIOLOGY

## 2021-06-09 PROCEDURE — 20902 REMOVAL OF BONE FOR GRAFT: CPT | Mod: 51,LT,, | Performed by: ORTHOPAEDIC SURGERY

## 2021-06-09 PROCEDURE — 71000015 HC POSTOP RECOV 1ST HR: Performed by: ORTHOPAEDIC SURGERY

## 2021-06-09 PROCEDURE — 64447 NJX AA&/STRD FEMORAL NRV IMG: CPT | Performed by: ANESTHESIOLOGY

## 2021-06-09 PROCEDURE — 64450 PR NERVE BLOCK INJ, ANES/STEROID, OTHER PERIPHERAL: ICD-10-PCS | Mod: 59,LT,, | Performed by: ANESTHESIOLOGY

## 2021-06-09 PROCEDURE — 28112 PR FULL EXCIS 2,3 OR 4TH METATAR HEAD: ICD-10-PCS | Mod: 51,T1,, | Performed by: ORTHOPAEDIC SURGERY

## 2021-06-09 PROCEDURE — D9220A PRA ANESTHESIA: ICD-10-PCS | Mod: ,,, | Performed by: ANESTHESIOLOGY

## 2021-06-09 PROCEDURE — 27800903 OPTIME MED/SURG SUP & DEVICES OTHER IMPLANTS: Performed by: ORTHOPAEDIC SURGERY

## 2021-06-09 PROCEDURE — 63600175 PHARM REV CODE 636 W HCPCS: Performed by: STUDENT IN AN ORGANIZED HEALTH CARE EDUCATION/TRAINING PROGRAM

## 2021-06-09 PROCEDURE — 36000709 HC OR TIME LEV III EA ADD 15 MIN: Performed by: ORTHOPAEDIC SURGERY

## 2021-06-09 PROCEDURE — 25000003 PHARM REV CODE 250: Performed by: NURSE ANESTHETIST, CERTIFIED REGISTERED

## 2021-06-09 PROCEDURE — D9220A PRA ANESTHESIA: Mod: ,,, | Performed by: ANESTHESIOLOGY

## 2021-06-09 PROCEDURE — 28285 REPAIR OF HAMMERTOE: CPT | Mod: 59,51,T1, | Performed by: ORTHOPAEDIC SURGERY

## 2021-06-09 PROCEDURE — 27201423 OPTIME MED/SURG SUP & DEVICES STERILE SUPPLY: Performed by: ORTHOPAEDIC SURGERY

## 2021-06-09 PROCEDURE — 36000708 HC OR TIME LEV III 1ST 15 MIN: Performed by: ORTHOPAEDIC SURGERY

## 2021-06-09 PROCEDURE — 64447 PR NERVE BLOCK INJ, ANES/STEROID, FEMORAL, INCL IMAG GUIDANCE: ICD-10-PCS | Mod: 59,LT,, | Performed by: ANESTHESIOLOGY

## 2021-06-09 PROCEDURE — 28113 PART REMOVAL OF METATARSAL: CPT | Mod: 51,T4,, | Performed by: ORTHOPAEDIC SURGERY

## 2021-06-09 PROCEDURE — 63600175 PHARM REV CODE 636 W HCPCS: Performed by: NURSE ANESTHETIST, CERTIFIED REGISTERED

## 2021-06-09 PROCEDURE — 28750 FUSION OF BIG TOE JOINT: CPT | Mod: TA,,, | Performed by: ORTHOPAEDIC SURGERY

## 2021-06-09 PROCEDURE — 82962 GLUCOSE BLOOD TEST: CPT | Performed by: ORTHOPAEDIC SURGERY

## 2021-06-09 PROCEDURE — 76942 ECHO GUIDE FOR BIOPSY: CPT | Performed by: ANESTHESIOLOGY

## 2021-06-09 PROCEDURE — 25000003 PHARM REV CODE 250: Performed by: STUDENT IN AN ORGANIZED HEALTH CARE EDUCATION/TRAINING PROGRAM

## 2021-06-09 PROCEDURE — 94761 N-INVAS EAR/PLS OXIMETRY MLT: CPT

## 2021-06-09 PROCEDURE — 71000033 HC RECOVERY, INTIAL HOUR: Performed by: ORTHOPAEDIC SURGERY

## 2021-06-09 PROCEDURE — C1713 ANCHOR/SCREW BN/BN,TIS/BN: HCPCS | Performed by: ORTHOPAEDIC SURGERY

## 2021-06-09 PROCEDURE — 99900035 HC TECH TIME PER 15 MIN (STAT)

## 2021-06-09 PROCEDURE — 64445 NJX AA&/STRD SCIATIC NRV IMG: CPT | Performed by: ANESTHESIOLOGY

## 2021-06-09 PROCEDURE — 76942 ECHO GUIDE FOR BIOPSY: CPT | Mod: 26,,, | Performed by: ANESTHESIOLOGY

## 2021-06-09 PROCEDURE — 27687 REVISION OF CALF TENDON: CPT | Mod: 51,LT,, | Performed by: ORTHOPAEDIC SURGERY

## 2021-06-09 PROCEDURE — 63600175 PHARM REV CODE 636 W HCPCS: Performed by: ANESTHESIOLOGY

## 2021-06-09 PROCEDURE — D9220A PRA ANESTHESIA: ICD-10-PCS | Mod: ,,, | Performed by: NURSE ANESTHETIST, CERTIFIED REGISTERED

## 2021-06-09 PROCEDURE — 28285 PR REPAIR OF HAMMERTOE,ONE: ICD-10-PCS | Mod: 59,51,T3, | Performed by: ORTHOPAEDIC SURGERY

## 2021-06-09 PROCEDURE — 37000008 HC ANESTHESIA 1ST 15 MINUTES: Performed by: ORTHOPAEDIC SURGERY

## 2021-06-09 PROCEDURE — 64447 NJX AA&/STRD FEMORAL NRV IMG: CPT | Mod: 59,LT,, | Performed by: ANESTHESIOLOGY

## 2021-06-09 PROCEDURE — 20902 PR REMV BONE FOR GRAFT MAJOR: ICD-10-PCS | Mod: 51,LT,, | Performed by: ORTHOPAEDIC SURGERY

## 2021-06-09 PROCEDURE — 71000039 HC RECOVERY, EACH ADD'L HOUR: Performed by: ORTHOPAEDIC SURGERY

## 2021-06-09 PROCEDURE — 28113 PR FULL EXCIS 5TH METATARSAL HEAD: ICD-10-PCS | Mod: 51,T4,, | Performed by: ORTHOPAEDIC SURGERY

## 2021-06-09 PROCEDURE — 63600175 PHARM REV CODE 636 W HCPCS: Performed by: ORTHOPAEDIC SURGERY

## 2021-06-09 PROCEDURE — 76942 PR U/S GUIDANCE FOR NEEDLE GUIDANCE: ICD-10-PCS | Mod: 26,,, | Performed by: ANESTHESIOLOGY

## 2021-06-09 PROCEDURE — 37000009 HC ANESTHESIA EA ADD 15 MINS: Performed by: ORTHOPAEDIC SURGERY

## 2021-06-09 DEVICE — SCREW BNE LOK ORTHLC 2.7X16MM: Type: IMPLANTABLE DEVICE | Site: FOOT | Status: FUNCTIONAL

## 2021-06-09 DEVICE — PIN FIX OLIVE TIP TEMP 1.1MM: Type: IMPLANTABLE DEVICE | Site: FOOT | Status: FUNCTIONAL

## 2021-06-09 DEVICE — KWIRE PLAIN STYLE 1 1.6X229MM: Type: IMPLANTABLE DEVICE | Site: FOOT | Status: FUNCTIONAL

## 2021-06-09 DEVICE — WIRE K TRCR PT 1 END 1.6X150MM: Type: IMPLANTABLE DEVICE | Site: FOOT | Status: FUNCTIONAL

## 2021-06-09 DEVICE — IMPLANTABLE DEVICE: Type: IMPLANTABLE DEVICE | Site: FOOT | Status: FUNCTIONAL

## 2021-06-09 DEVICE — SCREW BNE LOK ORTHLC 2.7X14MM: Type: IMPLANTABLE DEVICE | Site: FOOT | Status: FUNCTIONAL

## 2021-06-09 RX ORDER — LIDOCAINE HYDROCHLORIDE 20 MG/ML
INJECTION INTRAVENOUS
Status: DISCONTINUED | OUTPATIENT
Start: 2021-06-09 | End: 2021-06-09

## 2021-06-09 RX ORDER — HYDROCODONE BITARTRATE AND ACETAMINOPHEN 5; 325 MG/1; MG/1
1 TABLET ORAL EVERY 4 HOURS PRN
Status: DISCONTINUED | OUTPATIENT
Start: 2021-06-09 | End: 2021-06-09 | Stop reason: HOSPADM

## 2021-06-09 RX ORDER — ROCURONIUM BROMIDE 10 MG/ML
INJECTION, SOLUTION INTRAVENOUS
Status: DISCONTINUED | OUTPATIENT
Start: 2021-06-09 | End: 2021-06-09

## 2021-06-09 RX ORDER — BUPIVACAINE HYDROCHLORIDE 5 MG/ML
INJECTION, SOLUTION EPIDURAL; INTRACAUDAL
Status: COMPLETED | OUTPATIENT
Start: 2021-06-09 | End: 2021-06-09

## 2021-06-09 RX ORDER — CEFAZOLIN SODIUM 1 G/3ML
2 INJECTION, POWDER, FOR SOLUTION INTRAMUSCULAR; INTRAVENOUS
Status: COMPLETED | OUTPATIENT
Start: 2021-06-09 | End: 2021-06-09

## 2021-06-09 RX ORDER — BUPIVACAINE HYDROCHLORIDE 5 MG/ML
INJECTION, SOLUTION EPIDURAL; INTRACAUDAL
Status: COMPLETED
Start: 2021-06-09 | End: 2021-06-09

## 2021-06-09 RX ORDER — ONDANSETRON 2 MG/ML
INJECTION INTRAMUSCULAR; INTRAVENOUS
Status: DISCONTINUED | OUTPATIENT
Start: 2021-06-09 | End: 2021-06-09

## 2021-06-09 RX ORDER — VASOPRESSIN 20 [USP'U]/ML
INJECTION, SOLUTION INTRAMUSCULAR; SUBCUTANEOUS
Status: DISCONTINUED | OUTPATIENT
Start: 2021-06-09 | End: 2021-06-09

## 2021-06-09 RX ORDER — NEOSTIGMINE METHYLSULFATE 0.5 MG/ML
INJECTION, SOLUTION INTRAVENOUS
Status: DISCONTINUED | OUTPATIENT
Start: 2021-06-09 | End: 2021-06-09

## 2021-06-09 RX ORDER — FENTANYL CITRATE 50 UG/ML
INJECTION, SOLUTION INTRAMUSCULAR; INTRAVENOUS
Status: DISCONTINUED | OUTPATIENT
Start: 2021-06-09 | End: 2021-06-09

## 2021-06-09 RX ORDER — FAMOTIDINE 10 MG/ML
INJECTION INTRAVENOUS
Status: DISCONTINUED | OUTPATIENT
Start: 2021-06-09 | End: 2021-06-09

## 2021-06-09 RX ORDER — MIDAZOLAM HYDROCHLORIDE 1 MG/ML
0.5 INJECTION INTRAMUSCULAR; INTRAVENOUS
Status: DISPENSED | OUTPATIENT
Start: 2021-06-09

## 2021-06-09 RX ORDER — MIDAZOLAM HYDROCHLORIDE 1 MG/ML
INJECTION, SOLUTION INTRAMUSCULAR; INTRAVENOUS
Status: DISCONTINUED | OUTPATIENT
Start: 2021-06-09 | End: 2021-06-09

## 2021-06-09 RX ORDER — CELECOXIB 200 MG/1
400 CAPSULE ORAL ONCE
Status: COMPLETED | OUTPATIENT
Start: 2021-06-09 | End: 2021-06-09

## 2021-06-09 RX ORDER — PHENYLEPHRINE HYDROCHLORIDE 10 MG/ML
INJECTION INTRAVENOUS
Status: DISCONTINUED | OUTPATIENT
Start: 2021-06-09 | End: 2021-06-09

## 2021-06-09 RX ORDER — SUCCINYLCHOLINE CHLORIDE 20 MG/ML
INJECTION INTRAMUSCULAR; INTRAVENOUS
Status: DISCONTINUED | OUTPATIENT
Start: 2021-06-09 | End: 2021-06-09

## 2021-06-09 RX ORDER — ASPIRIN 325 MG
325 TABLET, DELAYED RELEASE (ENTERIC COATED) ORAL 2 TIMES DAILY
Qty: 60 TABLET | Refills: 0 | Status: SHIPPED | OUTPATIENT
Start: 2021-06-09 | End: 2022-06-09

## 2021-06-09 RX ORDER — ACETAMINOPHEN 500 MG
1000 TABLET ORAL
Status: COMPLETED | OUTPATIENT
Start: 2021-06-09 | End: 2021-06-09

## 2021-06-09 RX ORDER — HALOPERIDOL 5 MG/ML
1 INJECTION INTRAMUSCULAR ONCE
Status: COMPLETED | OUTPATIENT
Start: 2021-06-09 | End: 2021-06-09

## 2021-06-09 RX ORDER — EPHEDRINE SULFATE 50 MG/ML
INJECTION, SOLUTION INTRAVENOUS
Status: DISCONTINUED | OUTPATIENT
Start: 2021-06-09 | End: 2021-06-09

## 2021-06-09 RX ORDER — CIPROFLOXACIN HYDROCHLORIDE 3 MG/ML
1 SOLUTION/ DROPS OPHTHALMIC 4 TIMES DAILY
Status: DISCONTINUED | OUTPATIENT
Start: 2021-06-09 | End: 2021-06-09 | Stop reason: HOSPADM

## 2021-06-09 RX ORDER — FENTANYL CITRATE 50 UG/ML
25 INJECTION, SOLUTION INTRAMUSCULAR; INTRAVENOUS EVERY 5 MIN PRN
Status: DISPENSED | OUTPATIENT
Start: 2021-06-09

## 2021-06-09 RX ORDER — PROPOFOL 10 MG/ML
VIAL (ML) INTRAVENOUS
Status: DISCONTINUED | OUTPATIENT
Start: 2021-06-09 | End: 2021-06-09

## 2021-06-09 RX ORDER — DEXAMETHASONE SODIUM PHOSPHATE 4 MG/ML
INJECTION, SOLUTION INTRA-ARTICULAR; INTRALESIONAL; INTRAMUSCULAR; INTRAVENOUS; SOFT TISSUE
Status: DISCONTINUED | OUTPATIENT
Start: 2021-06-09 | End: 2021-06-09

## 2021-06-09 RX ORDER — KETAMINE HCL IN 0.9 % NACL 50 MG/5 ML
SYRINGE (ML) INTRAVENOUS
Status: DISCONTINUED | OUTPATIENT
Start: 2021-06-09 | End: 2021-06-09

## 2021-06-09 RX ORDER — NEOSTIGMINE METHYLSULFATE 1 MG/ML
INJECTION, SOLUTION INTRAVENOUS
Status: DISCONTINUED | OUTPATIENT
Start: 2021-06-09 | End: 2021-06-09

## 2021-06-09 RX ADMIN — FENTANYL CITRATE 100 MCG: 50 INJECTION, SOLUTION INTRAMUSCULAR; INTRAVENOUS at 07:06

## 2021-06-09 RX ADMIN — MIDAZOLAM 2 MG: 1 INJECTION INTRAMUSCULAR; INTRAVENOUS at 06:06

## 2021-06-09 RX ADMIN — VASOPRESSIN 2 UNITS: 20 INJECTION INTRAVENOUS at 08:06

## 2021-06-09 RX ADMIN — GLYCOPYRROLATE 0.4 MG: 0.2 INJECTION, SOLUTION INTRAMUSCULAR; INTRAVITREAL at 12:06

## 2021-06-09 RX ADMIN — EPHEDRINE SULFATE 10 MG: 50 INJECTION INTRAVENOUS at 09:06

## 2021-06-09 RX ADMIN — PHENYLEPHRINE HYDROCHLORIDE 200 MCG: 10 INJECTION INTRAVENOUS at 11:06

## 2021-06-09 RX ADMIN — EPHEDRINE SULFATE 10 MG: 50 INJECTION INTRAVENOUS at 10:06

## 2021-06-09 RX ADMIN — FENTANYL CITRATE 50 MCG: 50 INJECTION INTRAMUSCULAR; INTRAVENOUS at 06:06

## 2021-06-09 RX ADMIN — SUCCINYLCHOLINE CHLORIDE 140 MG: 20 INJECTION, SOLUTION INTRAMUSCULAR; INTRAVENOUS at 07:06

## 2021-06-09 RX ADMIN — DEXAMETHASONE SODIUM PHOSPHATE 8 MG: 4 INJECTION, SOLUTION INTRAMUSCULAR; INTRAVENOUS at 07:06

## 2021-06-09 RX ADMIN — PHENYLEPHRINE HYDROCHLORIDE 200 MCG: 10 INJECTION INTRAVENOUS at 10:06

## 2021-06-09 RX ADMIN — PHENYLEPHRINE HYDROCHLORIDE 100 MCG: 10 INJECTION INTRAVENOUS at 08:06

## 2021-06-09 RX ADMIN — ROCURONIUM BROMIDE 10 MG: 10 INJECTION, SOLUTION INTRAVENOUS at 07:06

## 2021-06-09 RX ADMIN — BUPIVACAINE HYDROCHLORIDE 10 ML: 5 INJECTION, SOLUTION EPIDURAL; INTRACAUDAL at 06:06

## 2021-06-09 RX ADMIN — ROCURONIUM BROMIDE 20 MG: 10 INJECTION, SOLUTION INTRAVENOUS at 10:06

## 2021-06-09 RX ADMIN — Medication 10 MG: at 08:06

## 2021-06-09 RX ADMIN — NEOSTIGMINE METHYLSULFATE 3 MG: 0.5 INJECTION INTRAVENOUS at 12:06

## 2021-06-09 RX ADMIN — PHENYLEPHRINE HYDROCHLORIDE 100 MCG: 10 INJECTION INTRAVENOUS at 07:06

## 2021-06-09 RX ADMIN — ACETAMINOPHEN 1000 MG: 500 TABLET ORAL at 06:06

## 2021-06-09 RX ADMIN — PHENYLEPHRINE HYDROCHLORIDE 300 MCG: 10 INJECTION INTRAVENOUS at 07:06

## 2021-06-09 RX ADMIN — ROCURONIUM BROMIDE 20 MG: 10 INJECTION, SOLUTION INTRAVENOUS at 09:06

## 2021-06-09 RX ADMIN — CEFAZOLIN 2 G: 330 INJECTION, POWDER, FOR SOLUTION INTRAMUSCULAR; INTRAVENOUS at 11:06

## 2021-06-09 RX ADMIN — Medication 10 MG: at 09:06

## 2021-06-09 RX ADMIN — CEFAZOLIN 2 G: 330 INJECTION, POWDER, FOR SOLUTION INTRAMUSCULAR; INTRAVENOUS at 07:06

## 2021-06-09 RX ADMIN — FLUORESCEIN SODIUM AND BENOXINATE HYDROCHLORIDE 1 DROP: 2.5; 4 SOLUTION OPHTHALMIC at 01:06

## 2021-06-09 RX ADMIN — Medication 30 MG: at 07:06

## 2021-06-09 RX ADMIN — VASOPRESSIN 4 UNITS: 20 INJECTION INTRAVENOUS at 09:06

## 2021-06-09 RX ADMIN — ONDANSETRON 4 MG: 2 INJECTION INTRAMUSCULAR; INTRAVENOUS at 11:06

## 2021-06-09 RX ADMIN — PHENYLEPHRINE HYDROCHLORIDE 200 MCG: 10 INJECTION INTRAVENOUS at 07:06

## 2021-06-09 RX ADMIN — FAMOTIDINE 20 MG: 10 INJECTION, SOLUTION INTRAVENOUS at 07:06

## 2021-06-09 RX ADMIN — SODIUM CHLORIDE: 0.9 INJECTION, SOLUTION INTRAVENOUS at 06:06

## 2021-06-09 RX ADMIN — CELECOXIB 400 MG: 200 CAPSULE ORAL at 05:06

## 2021-06-09 RX ADMIN — VASOPRESSIN 4 UNITS: 20 INJECTION INTRAVENOUS at 08:06

## 2021-06-09 RX ADMIN — HALOPERIDOL LACTATE 1 MG: 5 INJECTION, SOLUTION INTRAMUSCULAR at 01:06

## 2021-06-09 RX ADMIN — PHENYLEPHRINE HYDROCHLORIDE 200 MCG: 10 INJECTION INTRAVENOUS at 08:06

## 2021-06-09 RX ADMIN — LIDOCAINE HYDROCHLORIDE 75 MG: 20 INJECTION, SOLUTION INTRAVENOUS at 07:06

## 2021-06-09 RX ADMIN — PROPOFOL 100 MG: 10 INJECTION, EMULSION INTRAVENOUS at 07:06

## 2021-06-09 RX ADMIN — PROPOFOL 200 MG: 10 INJECTION, EMULSION INTRAVENOUS at 07:06

## 2021-06-09 RX ADMIN — BUPIVACAINE HYDROCHLORIDE 30 ML: 5 INJECTION, SOLUTION EPIDURAL; INTRACAUDAL; PERINEURAL at 06:06

## 2021-06-09 RX ADMIN — MIDAZOLAM HYDROCHLORIDE 2 MG: 1 INJECTION, SOLUTION INTRAMUSCULAR; INTRAVENOUS at 06:06

## 2021-06-24 ENCOUNTER — HOSPITAL ENCOUNTER (OUTPATIENT)
Dept: RADIOLOGY | Facility: HOSPITAL | Age: 51
Discharge: HOME | End: 2021-06-24
Attending: PHYSICIAN ASSISTANT
Payer: COMMERCIAL

## 2021-06-24 ENCOUNTER — OFFICE VISIT (OUTPATIENT)
Dept: ORTHOPEDICS | Facility: CLINIC | Age: 51
End: 2021-06-24
Payer: COMMERCIAL

## 2021-06-24 VITALS — HEIGHT: 69 IN | BODY MASS INDEX: 42.32 KG/M2

## 2021-06-24 DIAGNOSIS — Z09 FOLLOW-UP EXAMINATION AFTER ORTHOPEDIC SURGERY: Primary | ICD-10-CM

## 2021-06-24 DIAGNOSIS — Z09 FOLLOW-UP EXAMINATION AFTER ORTHOPEDIC SURGERY: ICD-10-CM

## 2021-06-24 PROCEDURE — 99024 PR POST-OP FOLLOW-UP VISIT: ICD-10-PCS | Mod: S$GLB,,, | Performed by: PHYSICIAN ASSISTANT

## 2021-06-24 PROCEDURE — 73630 X-RAY EXAM OF FOOT: CPT | Mod: TC,LT

## 2021-06-24 PROCEDURE — 99999 PR PBB SHADOW E&M-EST. PATIENT-LVL III: ICD-10-PCS | Mod: PBBFAC,,, | Performed by: PHYSICIAN ASSISTANT

## 2021-06-24 PROCEDURE — 3008F BODY MASS INDEX DOCD: CPT | Mod: CPTII,S$GLB,, | Performed by: PHYSICIAN ASSISTANT

## 2021-06-24 PROCEDURE — 3008F PR BODY MASS INDEX (BMI) DOCUMENTED: ICD-10-PCS | Mod: CPTII,S$GLB,, | Performed by: PHYSICIAN ASSISTANT

## 2021-06-24 PROCEDURE — 73630 XR FOOT COMPLETE 3 VIEW LEFT: ICD-10-PCS | Mod: 26,LT,, | Performed by: RADIOLOGY

## 2021-06-24 PROCEDURE — 73630 X-RAY EXAM OF FOOT: CPT | Mod: 26,LT,, | Performed by: RADIOLOGY

## 2021-06-24 PROCEDURE — 99024 POSTOP FOLLOW-UP VISIT: CPT | Mod: S$GLB,,, | Performed by: PHYSICIAN ASSISTANT

## 2021-06-24 PROCEDURE — 99999 PR PBB SHADOW E&M-EST. PATIENT-LVL III: CPT | Mod: PBBFAC,,, | Performed by: PHYSICIAN ASSISTANT

## 2021-06-24 RX ORDER — MOMETASONE FUROATE 1 MG/G
OINTMENT TOPICAL
COMMUNITY
Start: 2021-04-16

## 2021-07-07 ENCOUNTER — PATIENT MESSAGE (OUTPATIENT)
Dept: ORTHOPEDICS | Facility: CLINIC | Age: 51
End: 2021-07-07

## 2021-07-08 RX ORDER — GABAPENTIN 300 MG/1
300 CAPSULE ORAL NIGHTLY
Qty: 60 CAPSULE | Refills: 0 | Status: SHIPPED | OUTPATIENT
Start: 2021-07-08 | End: 2021-08-12 | Stop reason: SDUPTHER

## 2021-07-12 ENCOUNTER — PATIENT MESSAGE (OUTPATIENT)
Dept: ORTHOPEDICS | Facility: CLINIC | Age: 51
End: 2021-07-12

## 2021-07-15 ENCOUNTER — HOSPITAL ENCOUNTER (OUTPATIENT)
Dept: RADIOLOGY | Facility: HOSPITAL | Age: 51
Discharge: HOME | End: 2021-07-15
Attending: PHYSICIAN ASSISTANT
Payer: COMMERCIAL

## 2021-07-15 ENCOUNTER — OFFICE VISIT (OUTPATIENT)
Dept: ORTHOPEDICS | Facility: CLINIC | Age: 51
End: 2021-07-15
Payer: COMMERCIAL

## 2021-07-15 VITALS — BODY MASS INDEX: 42.32 KG/M2 | HEIGHT: 69 IN

## 2021-07-15 DIAGNOSIS — Z09 FOLLOW-UP EXAMINATION AFTER ORTHOPEDIC SURGERY: ICD-10-CM

## 2021-07-15 DIAGNOSIS — Z09 FOLLOW-UP EXAMINATION AFTER ORTHOPEDIC SURGERY: Primary | ICD-10-CM

## 2021-07-15 PROCEDURE — 99024 PR POST-OP FOLLOW-UP VISIT: ICD-10-PCS | Mod: S$GLB,,, | Performed by: PHYSICIAN ASSISTANT

## 2021-07-15 PROCEDURE — 73630 X-RAY EXAM OF FOOT: CPT | Mod: TC,LT

## 2021-07-15 PROCEDURE — 99024 POSTOP FOLLOW-UP VISIT: CPT | Mod: S$GLB,,, | Performed by: PHYSICIAN ASSISTANT

## 2021-07-15 PROCEDURE — 3008F BODY MASS INDEX DOCD: CPT | Mod: CPTII,S$GLB,, | Performed by: PHYSICIAN ASSISTANT

## 2021-07-15 PROCEDURE — 73630 XR FOOT COMPLETE 3 VIEW LEFT: ICD-10-PCS | Mod: 26,LT,, | Performed by: RADIOLOGY

## 2021-07-15 PROCEDURE — 1126F PR PAIN SEVERITY QUANTIFIED, NO PAIN PRESENT: ICD-10-PCS | Mod: S$GLB,,, | Performed by: PHYSICIAN ASSISTANT

## 2021-07-15 PROCEDURE — 99999 PR PBB SHADOW E&M-EST. PATIENT-LVL IV: ICD-10-PCS | Mod: PBBFAC,,, | Performed by: PHYSICIAN ASSISTANT

## 2021-07-15 PROCEDURE — 99999 PR PBB SHADOW E&M-EST. PATIENT-LVL IV: CPT | Mod: PBBFAC,,, | Performed by: PHYSICIAN ASSISTANT

## 2021-07-15 PROCEDURE — 3008F PR BODY MASS INDEX (BMI) DOCUMENTED: ICD-10-PCS | Mod: CPTII,S$GLB,, | Performed by: PHYSICIAN ASSISTANT

## 2021-07-15 PROCEDURE — 73630 X-RAY EXAM OF FOOT: CPT | Mod: 26,LT,, | Performed by: RADIOLOGY

## 2021-07-15 PROCEDURE — 1126F AMNT PAIN NOTED NONE PRSNT: CPT | Mod: S$GLB,,, | Performed by: PHYSICIAN ASSISTANT

## 2021-07-21 ENCOUNTER — PATIENT MESSAGE (OUTPATIENT)
Dept: ADMINISTRATIVE | Facility: OTHER | Age: 51
End: 2021-07-21

## 2021-08-11 DIAGNOSIS — M20.12 HALLUX VALGUS, LEFT: Primary | ICD-10-CM

## 2021-08-12 ENCOUNTER — PATIENT MESSAGE (OUTPATIENT)
Dept: ORTHOPEDICS | Facility: CLINIC | Age: 51
End: 2021-08-12

## 2021-08-12 ENCOUNTER — TELEPHONE (OUTPATIENT)
Dept: ORTHOPEDICS | Facility: CLINIC | Age: 51
End: 2021-08-12

## 2021-08-12 RX ORDER — GABAPENTIN 300 MG/1
300 CAPSULE ORAL NIGHTLY
Qty: 60 CAPSULE | Refills: 0 | Status: SHIPPED | OUTPATIENT
Start: 2021-08-12 | End: 2021-11-08 | Stop reason: SDUPTHER

## 2021-08-16 ENCOUNTER — OFFICE VISIT (OUTPATIENT)
Dept: ORTHOPEDICS | Facility: CLINIC | Age: 51
End: 2021-08-16
Payer: COMMERCIAL

## 2021-08-16 ENCOUNTER — HOSPITAL ENCOUNTER (OUTPATIENT)
Dept: RADIOLOGY | Facility: HOSPITAL | Age: 51
Discharge: HOME | End: 2021-08-16
Attending: ORTHOPAEDIC SURGERY
Payer: COMMERCIAL

## 2021-08-16 DIAGNOSIS — M20.12 HALLUX VALGUS, LEFT: ICD-10-CM

## 2021-08-16 DIAGNOSIS — Z09 FOLLOW-UP EXAMINATION AFTER ORTHOPEDIC SURGERY: Primary | ICD-10-CM

## 2021-08-16 PROCEDURE — 1159F MED LIST DOCD IN RCRD: CPT | Mod: CPTII,S$GLB,, | Performed by: PHYSICIAN ASSISTANT

## 2021-08-16 PROCEDURE — 1160F PR REVIEW ALL MEDS BY PRESCRIBER/CLIN PHARMACIST DOCUMENTED: ICD-10-PCS | Mod: CPTII,S$GLB,, | Performed by: PHYSICIAN ASSISTANT

## 2021-08-16 PROCEDURE — 1160F RVW MEDS BY RX/DR IN RCRD: CPT | Mod: CPTII,S$GLB,, | Performed by: PHYSICIAN ASSISTANT

## 2021-08-16 PROCEDURE — 1125F AMNT PAIN NOTED PAIN PRSNT: CPT | Mod: CPTII,S$GLB,, | Performed by: PHYSICIAN ASSISTANT

## 2021-08-16 PROCEDURE — 73630 X-RAY EXAM OF FOOT: CPT | Mod: 26,LT,, | Performed by: RADIOLOGY

## 2021-08-16 PROCEDURE — 99999 PR PBB SHADOW E&M-EST. PATIENT-LVL III: ICD-10-PCS | Mod: PBBFAC,,, | Performed by: PHYSICIAN ASSISTANT

## 2021-08-16 PROCEDURE — 1159F PR MEDICATION LIST DOCUMENTED IN MEDICAL RECORD: ICD-10-PCS | Mod: CPTII,S$GLB,, | Performed by: PHYSICIAN ASSISTANT

## 2021-08-16 PROCEDURE — 99024 POSTOP FOLLOW-UP VISIT: CPT | Mod: S$GLB,,, | Performed by: PHYSICIAN ASSISTANT

## 2021-08-16 PROCEDURE — 99999 PR PBB SHADOW E&M-EST. PATIENT-LVL III: CPT | Mod: PBBFAC,,, | Performed by: PHYSICIAN ASSISTANT

## 2021-08-16 PROCEDURE — 99024 PR POST-OP FOLLOW-UP VISIT: ICD-10-PCS | Mod: S$GLB,,, | Performed by: PHYSICIAN ASSISTANT

## 2021-08-16 PROCEDURE — 1125F PR PAIN SEVERITY QUANTIFIED, PAIN PRESENT: ICD-10-PCS | Mod: CPTII,S$GLB,, | Performed by: PHYSICIAN ASSISTANT

## 2021-08-16 PROCEDURE — 73630 XR FOOT COMPLETE 3 VIEW LEFT: ICD-10-PCS | Mod: 26,LT,, | Performed by: RADIOLOGY

## 2021-08-16 PROCEDURE — 73630 X-RAY EXAM OF FOOT: CPT | Mod: TC,LT

## 2021-09-10 ENCOUNTER — TELEPHONE (OUTPATIENT)
Dept: ORTHOPEDICS | Facility: CLINIC | Age: 51
End: 2021-09-10

## 2021-09-13 ENCOUNTER — PATIENT MESSAGE (OUTPATIENT)
Dept: ORTHOPEDICS | Facility: CLINIC | Age: 51
End: 2021-09-13

## 2021-09-14 ENCOUNTER — OFFICE VISIT (OUTPATIENT)
Dept: ORTHOPEDICS | Facility: CLINIC | Age: 51
End: 2021-09-14
Payer: COMMERCIAL

## 2021-09-14 ENCOUNTER — HOSPITAL ENCOUNTER (OUTPATIENT)
Dept: RADIOLOGY | Facility: HOSPITAL | Age: 51
Discharge: HOME | End: 2021-09-14
Attending: ORTHOPAEDIC SURGERY
Payer: COMMERCIAL

## 2021-09-14 DIAGNOSIS — G89.18 POST-OP PAIN: ICD-10-CM

## 2021-09-14 DIAGNOSIS — Z09 FOLLOW-UP EXAMINATION AFTER ORTHOPEDIC SURGERY: Primary | ICD-10-CM

## 2021-09-14 PROCEDURE — 73630 X-RAY EXAM OF FOOT: CPT | Mod: TC,LT

## 2021-09-14 PROCEDURE — 1159F PR MEDICATION LIST DOCUMENTED IN MEDICAL RECORD: ICD-10-PCS | Mod: CPTII,S$GLB,, | Performed by: ORTHOPAEDIC SURGERY

## 2021-09-14 PROCEDURE — 99999 PR PBB SHADOW E&M-EST. PATIENT-LVL III: CPT | Mod: PBBFAC,,, | Performed by: ORTHOPAEDIC SURGERY

## 2021-09-14 PROCEDURE — 99024 PR POST-OP FOLLOW-UP VISIT: ICD-10-PCS | Mod: S$GLB,,, | Performed by: ORTHOPAEDIC SURGERY

## 2021-09-14 PROCEDURE — 73630 XR FOOT COMPLETE 3 VIEW LEFT: ICD-10-PCS | Mod: 26,LT,, | Performed by: RADIOLOGY

## 2021-09-14 PROCEDURE — 99999 PR PBB SHADOW E&M-EST. PATIENT-LVL III: ICD-10-PCS | Mod: PBBFAC,,, | Performed by: ORTHOPAEDIC SURGERY

## 2021-09-14 PROCEDURE — 73630 X-RAY EXAM OF FOOT: CPT | Mod: 26,LT,, | Performed by: RADIOLOGY

## 2021-09-14 PROCEDURE — 99024 POSTOP FOLLOW-UP VISIT: CPT | Mod: S$GLB,,, | Performed by: ORTHOPAEDIC SURGERY

## 2021-09-14 PROCEDURE — 1159F MED LIST DOCD IN RCRD: CPT | Mod: CPTII,S$GLB,, | Performed by: ORTHOPAEDIC SURGERY

## 2021-09-16 ENCOUNTER — PATIENT MESSAGE (OUTPATIENT)
Dept: ORTHOPEDICS | Facility: CLINIC | Age: 51
End: 2021-09-16

## 2021-09-16 RX ORDER — CICLOPIROX 7.7 MG/G
GEL TOPICAL 2 TIMES DAILY
Qty: 100 G | Refills: 1 | Status: SHIPPED | OUTPATIENT
Start: 2021-09-16 | End: 2022-09-16

## 2021-10-08 ENCOUNTER — CLINICAL SUPPORT (OUTPATIENT)
Dept: URGENT CARE | Facility: CLINIC | Age: 51
End: 2021-10-08
Payer: COMMERCIAL

## 2021-10-08 DIAGNOSIS — Z11.59 ENCOUNTER FOR SCREENING FOR OTHER VIRAL DISEASES: Primary | ICD-10-CM

## 2021-10-08 LAB
CTP QC/QA: YES
SARS-COV-2 RDRP RESP QL NAA+PROBE: NEGATIVE

## 2021-10-08 PROCEDURE — 99211 PR OFFICE/OUTPT VISIT, EST, LEVL I: ICD-10-PCS | Mod: S$GLB,CS,, | Performed by: STUDENT IN AN ORGANIZED HEALTH CARE EDUCATION/TRAINING PROGRAM

## 2021-10-08 PROCEDURE — 99211 OFF/OP EST MAY X REQ PHY/QHP: CPT | Mod: S$GLB,CS,, | Performed by: STUDENT IN AN ORGANIZED HEALTH CARE EDUCATION/TRAINING PROGRAM

## 2021-10-08 PROCEDURE — U0002: ICD-10-PCS | Mod: QW,S$GLB,, | Performed by: STUDENT IN AN ORGANIZED HEALTH CARE EDUCATION/TRAINING PROGRAM

## 2021-10-08 PROCEDURE — U0002 COVID-19 LAB TEST NON-CDC: HCPCS | Mod: QW,S$GLB,, | Performed by: STUDENT IN AN ORGANIZED HEALTH CARE EDUCATION/TRAINING PROGRAM

## 2021-11-05 DIAGNOSIS — M20.12 HALLUX VALGUS, LEFT: Primary | ICD-10-CM

## 2021-11-09 ENCOUNTER — OFFICE VISIT (OUTPATIENT)
Dept: ORTHOPEDICS | Facility: CLINIC | Age: 51
End: 2021-11-09
Payer: COMMERCIAL

## 2021-11-09 ENCOUNTER — IMMUNIZATION (OUTPATIENT)
Dept: PHARMACY | Facility: CLINIC | Age: 51
End: 2021-11-09
Payer: COMMERCIAL

## 2021-11-09 ENCOUNTER — HOSPITAL ENCOUNTER (OUTPATIENT)
Dept: RADIOLOGY | Facility: HOSPITAL | Age: 51
Discharge: HOME | End: 2021-11-09
Attending: ORTHOPAEDIC SURGERY
Payer: COMMERCIAL

## 2021-11-09 DIAGNOSIS — M20.12 HALLUX VALGUS, LEFT: ICD-10-CM

## 2021-11-09 DIAGNOSIS — Z09 FOLLOW-UP EXAMINATION AFTER ORTHOPEDIC SURGERY: ICD-10-CM

## 2021-11-09 DIAGNOSIS — M20.12 HALLUX VALGUS, LEFT: Primary | ICD-10-CM

## 2021-11-09 DIAGNOSIS — Z23 NEED FOR VACCINATION: Primary | ICD-10-CM

## 2021-11-09 PROCEDURE — 99213 PR OFFICE/OUTPT VISIT, EST, LEVL III, 20-29 MIN: ICD-10-PCS | Mod: S$GLB,,, | Performed by: ORTHOPAEDIC SURGERY

## 2021-11-09 PROCEDURE — 1159F MED LIST DOCD IN RCRD: CPT | Mod: CPTII,S$GLB,, | Performed by: ORTHOPAEDIC SURGERY

## 2021-11-09 PROCEDURE — 73630 X-RAY EXAM OF FOOT: CPT | Mod: 26,LT,, | Performed by: RADIOLOGY

## 2021-11-09 PROCEDURE — 73630 XR FOOT COMPLETE 3 VIEW LEFT: ICD-10-PCS | Mod: 26,LT,, | Performed by: RADIOLOGY

## 2021-11-09 PROCEDURE — 99999 PR PBB SHADOW E&M-EST. PATIENT-LVL III: CPT | Mod: PBBFAC,,, | Performed by: ORTHOPAEDIC SURGERY

## 2021-11-09 PROCEDURE — 73630 X-RAY EXAM OF FOOT: CPT | Mod: TC,PO,LT

## 2021-11-09 PROCEDURE — 99999 PR PBB SHADOW E&M-EST. PATIENT-LVL III: ICD-10-PCS | Mod: PBBFAC,,, | Performed by: ORTHOPAEDIC SURGERY

## 2021-11-09 PROCEDURE — 1159F PR MEDICATION LIST DOCUMENTED IN MEDICAL RECORD: ICD-10-PCS | Mod: CPTII,S$GLB,, | Performed by: ORTHOPAEDIC SURGERY

## 2021-11-09 PROCEDURE — 99213 OFFICE O/P EST LOW 20 MIN: CPT | Mod: S$GLB,,, | Performed by: ORTHOPAEDIC SURGERY

## 2021-11-23 ENCOUNTER — CLINICAL SUPPORT (OUTPATIENT)
Dept: URGENT CARE | Facility: CLINIC | Age: 51
End: 2021-11-23
Payer: COMMERCIAL

## 2021-11-23 DIAGNOSIS — Z11.52 ENCOUNTER FOR SCREENING FOR COVID-19: Primary | ICD-10-CM

## 2021-11-23 LAB
CTP QC/QA: YES
SARS-COV-2 RDRP RESP QL NAA+PROBE: NEGATIVE

## 2021-11-23 PROCEDURE — U0002 COVID-19 LAB TEST NON-CDC: HCPCS | Mod: QW,S$GLB,,

## 2021-11-23 PROCEDURE — U0002: ICD-10-PCS | Mod: QW,S$GLB,,

## 2021-12-17 ENCOUNTER — CLINICAL SUPPORT (OUTPATIENT)
Dept: URGENT CARE | Facility: CLINIC | Age: 51
End: 2021-12-17
Payer: COMMERCIAL

## 2021-12-17 DIAGNOSIS — Z20.822 ENCOUNTER FOR LABORATORY TESTING FOR COVID-19 VIRUS: Primary | ICD-10-CM

## 2021-12-17 LAB
CTP QC/QA: YES
SARS-COV-2 RDRP RESP QL NAA+PROBE: NEGATIVE

## 2021-12-17 PROCEDURE — U0002 COVID-19 LAB TEST NON-CDC: HCPCS | Mod: QW,S$GLB,, | Performed by: PHYSICIAN ASSISTANT

## 2021-12-17 PROCEDURE — U0002: ICD-10-PCS | Mod: QW,S$GLB,, | Performed by: PHYSICIAN ASSISTANT

## 2022-03-01 ENCOUNTER — HOSPITAL ENCOUNTER (EMERGENCY)
Facility: HOSPITAL | Age: 52
Discharge: HOME | End: 2022-03-01
Attending: EMERGENCY MEDICINE
Payer: COMMERCIAL

## 2022-03-01 VITALS
HEART RATE: 80 BPM | HEIGHT: 68 IN | WEIGHT: 293 LBS | BODY MASS INDEX: 44.41 KG/M2 | SYSTOLIC BLOOD PRESSURE: 180 MMHG | OXYGEN SATURATION: 100 % | DIASTOLIC BLOOD PRESSURE: 76 MMHG | RESPIRATION RATE: 16 BRPM | TEMPERATURE: 98 F

## 2022-03-01 DIAGNOSIS — M25.561 ACUTE PAIN OF RIGHT KNEE: ICD-10-CM

## 2022-03-01 DIAGNOSIS — R52 PAIN: ICD-10-CM

## 2022-03-01 DIAGNOSIS — M79.18 RIGHT BUTTOCK PAIN: ICD-10-CM
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
## 2022-03-01 DIAGNOSIS — M25.512 ACUTE PAIN OF LEFT SHOULDER: ICD-10-CM

## 2022-03-01 PROCEDURE — 25000003 PHARM REV CODE 250: Performed by: NURSE PRACTITIONER

## 2022-03-01 PROCEDURE — 99284 EMERGENCY DEPT VISIT MOD MDM: CPT | Mod: 25

## 2022-03-01 PROCEDURE — 96372 THER/PROPH/DIAG INJ SC/IM: CPT

## 2022-03-01 PROCEDURE — 63600175 PHARM REV CODE 636 W HCPCS: Performed by: NURSE PRACTITIONER

## 2022-03-01 RX ORDER — MORPHINE SULFATE 4 MG/ML
8 INJECTION, SOLUTION INTRAMUSCULAR; INTRAVENOUS
Status: COMPLETED | OUTPATIENT
Start: 2022-03-01 | End: 2022-03-01

## 2022-03-01 RX ORDER — OXYCODONE AND ACETAMINOPHEN 5; 325 MG/1; MG/1
1 TABLET ORAL
Status: COMPLETED | OUTPATIENT
Start: 2022-03-01 | End: 2022-03-01

## 2022-03-01 RX ORDER — OXYCODONE AND ACETAMINOPHEN 5; 325 MG/1; MG/1
1 TABLET ORAL EVERY 6 HOURS PRN
Qty: 12 TABLET | Refills: 0 | Status: ON HOLD | OUTPATIENT
Start: 2022-03-01 | End: 2023-07-20 | Stop reason: HOSPADM

## 2022-03-01 RX ORDER — MORPHINE SULFATE 4 MG/ML
8 INJECTION, SOLUTION INTRAMUSCULAR; INTRAVENOUS
Status: DISCONTINUED | OUTPATIENT
Start: 2022-03-01 | End: 2022-03-01

## 2022-03-01 RX ADMIN — MORPHINE SULFATE 8 MG: 4 INJECTION, SOLUTION INTRAMUSCULAR; INTRAVENOUS at 05:03

## 2022-03-01 RX ADMIN — OXYCODONE HYDROCHLORIDE AND ACETAMINOPHEN 1 TABLET: 5; 325 TABLET ORAL at 07:03

## 2022-03-01 NOTE — Clinical Note
"Kalee Pradomy" Joon was seen and treated in our emergency department on 3/1/2022.  She may return to work on 03/04/2022.       If you have any questions or concerns, please don't hesitate to call.      CYRUS Fleming"

## 2022-03-01 NOTE — ED PROVIDER NOTES
Encounter Date: 3/1/2022       History     Chief Complaint   Patient presents with    Fall     Pt presents via EMS s/p fall, pt states she tripped and fell landing on right knee.  Pt c/o right knee/hip pain and left shoulder pain.  Pt is a/o x3 to person, place and time, resp easy, skin w/d.  No deformity or edema noted to areas of pain.  Pt denies hitting head, blood thinner or any LOC.      CC:  Trip and fall with hip pain, knee pain, and shoulder pain    HPI: Kalee Dupree, a 51 y.o. female presents to the ED following a mechanical trip and fall that occurred just prior to arrival.  Patient reports she was getting up, tripped over someone's leg and fell onto her right knee, right hip, and left outstretched hand.  She reports pain to the right hip/posterior lateral buttocks, the right inferior lateral knee, in the left posterior shoulder.  She reports no head injuries.  No loss of consciousness.  She arrived by EMS.  No medications or treatment given prior to arrival.  She reports no other injuries.    Patient Active Problem List:     Focal segmental glomerulosclerosis     Proteinuria     Chest pain, atypical     Mixed stress and urge urinary incontinence     Type 2 diabetes mellitus without complication, without long-term current use of insulin     Hypertensive heart disease with diastolic heart failure     Hypertriglyceridemia     Morbid obesity with BMI of 45.0-49.9, adult     FADIA on CPAP     Osteoarthritis of spine with radiculopathy, lumbar region     Depression with anxiety     Seasonal allergic rhinitis     Preop examination      The history is provided by the patient. No  was used.     Review of patient's allergies indicates:   Allergen Reactions    Amlodipine Nausea And Vomiting     Other reaction(s): Unknown    Amlodipine-benazepril Itching    Azithromycin      Stomach pain, and cramping    Benazepril Nausea And Vomiting     Other reaction(s): Unknown    Hydrocodone       Other reaction(s): Unknown    Hydrocodone-acetaminophen Hives and Itching     Past Medical History:   Diagnosis Date    Allergy     Anemia     Anxiety     Depression     Diabetes mellitus, type 2     Focal segmental glomerulosclerosis     Hyperlipidemia     Hypertension     Lumbar disc disease     Morbid obesity     FADIA on CPAP     Proteinuria     Retinal artery occlusion      Past Surgical History:   Procedure Laterality Date     SECTION       SECTION      CHOLECYSTECTOMY      CORRECTION OF HAMMER TOE Left 2021    Procedure: LEFT 2-5th metatarsal head resections, hammertoe corrections;  Surgeon: Naima Mistry MD;  Location: [facility] OR;  Service: Orthopedics;  Laterality: Left;    DILATION AND CURETTAGE OF UTERUS      EYE SURGERY      RESECTION OF GASTROCNEMIUS MUSCLE Left 2021    Procedure: LEFT gastroc recession;  Surgeon: Naima Mistry MD;  Location: [facility] OR;  Service: Orthopedics;  Laterality: Left;  Thigh tourniquet     Family History   Problem Relation Age of Onset    Cancer Mother 56        colon    Diabetes Mother     Hyperlipidemia Mother     Hypertension Mother     Obesity Mother     Kidney disease Mother     Thyroid disease Mother     Colon cancer Mother     Cancer Father         lung    Hypertension Father     Diabetes Brother     Hypertension Brother     Heart disease Paternal Grandfather      Social History     Tobacco Use    Smoking status: Former Smoker     Packs/day: 0.50     Years: 7.00     Pack years: 3.50    Smokeless tobacco: Never Used    Tobacco comment: Quit .   Substance Use Topics    Alcohol use: Yes     Comment: Rare.     Drug use: No     Review of Systems   Constitutional: Negative for fever.   HENT: Negative for sore throat and trouble swallowing.         (-) Head Injury   Respiratory: Negative for cough and shortness of breath.    Cardiovascular: Negative for chest pain and leg swelling.   Gastrointestinal: Negative for  abdominal pain, diarrhea, nausea and vomiting.   Musculoskeletal: Positive for arthralgias and gait problem (pain with walking). Negative for joint swelling, neck pain and neck stiffness.   Neurological: Negative for seizures, syncope, weakness and headaches.   Psychiatric/Behavioral: Negative for confusion.       Physical Exam     Initial Vitals [03/01/22 1653]   BP Pulse Resp Temp SpO2   (!) 189/86 83 16 98.3 °F (36.8 °C) 100 %      MAP       --         Physical Exam    Nursing note and vitals reviewed.  Constitutional: She appears well-developed and well-nourished. She is not diaphoretic. She is Obese . She is cooperative.  Non-toxic appearance. She does not have a sickly appearance. She does not appear ill. No distress.   HENT:   Head: Normocephalic and atraumatic.   Right Ear: External ear normal.   Left Ear: External ear normal.   Nose: Nose normal.   Mouth/Throat: Mucous membranes are normal. No trismus in the jaw.   Eyes: Conjunctivae and EOM are normal.   Neck: Phonation normal. No tracheal deviation present.   Normal range of motion.  Cardiovascular: Normal rate, regular rhythm and intact distal pulses.   Pulses:       Radial pulses are 2+ on the right side and 2+ on the left side.        Dorsalis pedis pulses are 2+ on the right side and 2+ on the left side.   Pulmonary/Chest: Effort normal. No accessory muscle usage or stridor. No tachypnea and no bradypnea. No respiratory distress. She exhibits no tenderness.   Abdominal: Abdomen is flat. She exhibits no distension. There is no abdominal tenderness.   Musculoskeletal:         General: Normal range of motion.      Left shoulder: Tenderness (posterior) present. No swelling, deformity or bony tenderness. Normal range of motion. Normal strength. Normal pulse.      Left upper arm: No tenderness or bony tenderness.      Cervical back: Normal range of motion.      Right hip: Tenderness present. No deformity.      Right upper leg: No tenderness or bony  tenderness.      Right knee: No crepitus. Tenderness present over the lateral joint line (inferior). No medial joint line tenderness. Normal alignment and normal patellar mobility.      Right lower leg: No tenderness or bony tenderness.      Right ankle: No tenderness.     Neurological: She is alert and oriented to person, place, and time. She has normal strength. No sensory deficit. Coordination and gait normal. GCS score is 15. GCS eye subscore is 4. GCS verbal subscore is 5. GCS motor subscore is 6.   Skin: Skin is warm, dry and intact. Capillary refill takes less than 2 seconds. No bruising and no rash noted. No cyanosis or erythema. Nails show no clubbing.   Psychiatric: She has a normal mood and affect. Her behavior is normal. Judgment and thought content normal.         ED Course   Procedures  Labs Reviewed - No data to display       Imaging Results          X-Ray Hip 2 or 3 views Right (with Pelvis when performed) (Final result)  Result time 03/01/22 18:43:47    Final result by Karen Daigle MD (03/01/22 18:43:47)                 Impression:      As above described.      Electronically signed by: Karen Daigle  Date:    03/01/2022  Time:    18:43             Narrative:    EXAMINATION:  TWO VIEWS OF THE RIGHT HIP    CLINICAL HISTORY:  Pain, unspecified    TECHNIQUE:  AP and lateral view of the right hip    COMPARISON:  None.    FINDINGS:  Examination is limited by body habitus.  Two views of the right hip demonstrate no definite acute fracture or dislocation.  However, if pain is out of proportion to the radiological findings, recommend CT or MRI.                               X-Ray Knee 3 View Right (Final result)  Result time 03/01/22 18:09:08    Final result by Adriel Emerson MD (03/01/22 18:09:08)                 Impression:      1. No acute displaced fracture or dislocation of the knee.      Electronically signed by: Adirel Emerson MD  Date:    03/01/2022  Time:    18:09              Narrative:    EXAMINATION:  XR KNEE 3 VIEW RIGHT    CLINICAL HISTORY:  Pain, unspecified    TECHNIQUE:  AP, lateral, and Merchant views of the right knee were performed.    COMPARISON:  None    FINDINGS:  Three views right knee.    No acute displaced fracture or dislocation of the knee.  No radiopaque foreign body.  No large knee joint effusion.                               X-Ray Shoulder Trauma Left (Final result)  Result time 03/01/22 18:07:45    Final result by Adriel Emerson MD (03/01/22 18:07:45)                 Impression:      1. No convincing acute displaced fracture or dislocation of the left shoulder.      Electronically signed by: Adriel Emerson MD  Date:    03/01/2022  Time:    18:07             Narrative:    EXAMINATION:  XR SHOULDER TRAUMA 3 VIEW LEFT    CLINICAL HISTORY:  Pain, unspecified    TECHNIQUE:  Three views of the left shoulder were performed.    COMPARISON  12/05/2008    FINDINGS:  Three views left shoulder.    Please note artifact from habitus limits visualization of the left shoulder.    Allowing for the above, the left humeral head maintains appropriate relationship with the glenoid.  The acromioclavicular joint is intact.  No acute displaced left rib fracture.  The left lung zones are clear.                                 Medications   oxyCODONE-acetaminophen 5-325 mg per tablet 1 tablet (has no administration in time range)   morphine injection 8 mg (8 mg Intramuscular Given 3/1/22 9396)           APC / Resident Notes:       This is an evaluation of a 51 y.o. female that presents to the Emergency Department for right posterior buttock pain, right knee pain, and left posterior shoulder pain following a fall. Physical Exam shows a non-toxic, afebrile, and well appearing female.  There is tender to palpation of the anterior lateral right knee without crepitus or step-off.  Normal range of motion.  No joint laxity.  There is tenderness palpation of the right posterior lateral  buttock.  There is no overt tenderness over the right hip anterior or lateral hip.  Tender to palpation of the left posterior muscular upper back. Vital signs are reassuring. If available, previous records reviewed. RESULTS:  X-ray of the hip without acute fracture or dislocation.  Limited by body habitus however patient is able to ambulate to the restroom and weight-bearing, but less likely acute fracture at this time.  She does have an antalgic gait because of pain however she is able to fully bear weight on the affected extremity.  X-ray the right knee without acute fracture or dislocation.  No effusion.  X-ray the shoulder without acute fracture or dislocation.    My overall impression is right hip/buttock pain, right knee pain, left upper back/shoulder pain. I considered, but at this time, do not suspect acute displaced fracture, dislocation, septic joint, cellulitis, compartment syndrome.  Have discussed with the patient return precautions if symptoms worsening or pain not improving in 2-3 days for repeat assessment and potential additional imaging.    Discharge Meds/Instructions:  Is allergic to hydrocodone, can take oxycodone.  Will prescribe a short course of oxycodone.  Offered crutches, patient not able to use crutches at baseline.  Has knee scooter at home from previous surgery.  Will encourage to use rice therapy for the right extremity for the next 1-2 days.. The diagnosis, treatment plan, instructions for follow-up as well as ED return precautions were discussed. All questions have been answered.  YAO Pedro, CYRUS-C        ED Course as of 03/01/22 1916   Tue Mar 01, 2022   1904 Reassessment:  Patient ambulates the restroom.  Does report pain in the buttock and knee with ambulation but she is able to weight bear on the right side. [AF]      ED Course User Index  [AF] CYRUS Fleming             Clinical Impression:   Final diagnoses:  [R52] Pain  [M79.18] Right buttock pain  [M25.561] Acute  pain of right knee  [W19.XXXA] Fall, initial encounter (Primary)  [M25.512] Acute pain of left shoulder          ED Disposition Condition    Discharge Stable        ED Prescriptions     Medication Sig Dispense Start Date End Date Auth. Provider    oxyCODONE-acetaminophen (PERCOCET) 5-325 mg per tablet Take 1 tablet by mouth every 6 (six) hours as needed for Pain. 12 tablet 3/1/2022  CYRUS Fleming        Follow-up Information     Follow up With Specialties Details Why Contact Info    Your Primary Care Doctor  Schedule an appointment as soon as possible for a visit  Please call and schedule an appointment for follow up this week.     [facility] Emergency Medicine Go to  If symptoms worsen [address] [place] [id number] [phone]           CYRUS Fleming  03/01/22 7577

## 2022-03-01 NOTE — ED TRIAGE NOTES
Pt tripped over husbands foot at restaurant  complaining of pain to right knee and hip, and left shoulder. Pt has full ROM in all 4 extremities. Aox4. No dizziness or light headedness

## 2022-03-02 NOTE — DISCHARGE INSTRUCTIONS
§ Please return to the Emergency Department for any new or worsening symptoms including: fever, chest pain, shortness of breath, loss of consciousness, dizziness, weakness, or any other concerns.     § Schedule an appointment for follow up with your Primary Care Doctor as soon as possible for a recheck of your symptoms. If you do not have one, contact the one listed on your discharge paperwork or call the [facility] Appointment Desk at [phone] to schedule an appointment.     § Please take all medication as prescribed. You have been prescribed Percocet (Oxycodone) for pain. Please do not take this medication while working, drinking alcohol, swimming, or while driving/operating heavy machinery. This medication may cause drowsiness, dizziness, impair judgment, and reduce physical capabilities.You should not drive, operate heavy machinery, or make life changing decisions while taking this medication.  This medication contains Tylenol. Please do not take any additional Tylenol while you are taking this medication.

## 2022-03-06 ENCOUNTER — HOSPITAL ENCOUNTER (EMERGENCY)
Facility: HOSPITAL | Age: 52
Discharge: HOME | End: 2022-03-06
Attending: EMERGENCY MEDICINE
Payer: COMMERCIAL

## 2022-03-06 VITALS
WEIGHT: 293 LBS | TEMPERATURE: 98 F | HEART RATE: 90 BPM | DIASTOLIC BLOOD PRESSURE: 96 MMHG | SYSTOLIC BLOOD PRESSURE: 147 MMHG | BODY MASS INDEX: 44.41 KG/M2 | RESPIRATION RATE: 20 BRPM | OXYGEN SATURATION: 98 % | HEIGHT: 68 IN
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
## 2022-03-06 DIAGNOSIS — M25.561 ACUTE PAIN OF RIGHT KNEE: Primary | ICD-10-CM

## 2022-03-06 DIAGNOSIS — S82.001A CLOSED NONDISPLACED FRACTURE OF RIGHT PATELLA, UNSPECIFIED FRACTURE MORPHOLOGY, INITIAL ENCOUNTER: ICD-10-CM

## 2022-03-06 LAB
BUN SERPL-MCNC: 22 MG/DL (ref 6–30)
CHLORIDE SERPL-SCNC: 101 MMOL/L (ref 95–110)
CREAT SERPL-MCNC: 1.1 MG/DL (ref 0.5–1.4)
GLUCOSE SERPL-MCNC: 117 MG/DL (ref 70–110)
HCT VFR BLD CALC: 41 %PCV (ref 36–54)
POC IONIZED CALCIUM: 1.15 MMOL/L (ref 1.06–1.42)
POC TCO2 (MEASURED): 28 MMOL/L (ref 23–29)
POTASSIUM BLD-SCNC: 3.4 MMOL/L (ref 3.5–5.1)
SAMPLE: ABNORMAL
SODIUM BLD-SCNC: 141 MMOL/L (ref 136–145)

## 2022-03-06 PROCEDURE — 80047 BASIC METABLC PNL IONIZED CA: CPT

## 2022-03-06 PROCEDURE — 25000003 PHARM REV CODE 250: Performed by: EMERGENCY MEDICINE

## 2022-03-06 PROCEDURE — 99284 EMERGENCY DEPT VISIT MOD MDM: CPT | Mod: 25

## 2022-03-06 PROCEDURE — 99285 EMERGENCY DEPT VISIT HI MDM: CPT | Mod: ,,, | Performed by: EMERGENCY MEDICINE

## 2022-03-06 PROCEDURE — 87389 HIV-1 AG W/HIV-1&-2 AB AG IA: CPT | Performed by: EMERGENCY MEDICINE

## 2022-03-06 PROCEDURE — 86803 HEPATITIS C AB TEST: CPT | Performed by: EMERGENCY MEDICINE

## 2022-03-06 PROCEDURE — 29505 APPLICATION LONG LEG SPLINT: CPT | Mod: RT

## 2022-03-06 PROCEDURE — 99285 PR EMERGENCY DEPT VISIT,LEVEL V: ICD-10-PCS | Mod: ,,, | Performed by: EMERGENCY MEDICINE

## 2022-03-06 RX ORDER — OXYCODONE HYDROCHLORIDE 5 MG/1
5 TABLET ORAL EVERY 6 HOURS PRN
Qty: 11 TABLET | Refills: 0 | Status: ON HOLD | OUTPATIENT
Start: 2022-03-06 | End: 2023-07-20 | Stop reason: HOSPADM

## 2022-03-06 RX ORDER — OXYCODONE HYDROCHLORIDE 5 MG/1
5 TABLET ORAL
Status: COMPLETED | OUTPATIENT
Start: 2022-03-06 | End: 2022-03-06

## 2022-03-06 RX ADMIN — OXYCODONE 5 MG: 5 TABLET ORAL at 12:03

## 2022-03-06 NOTE — ED NOTES
Patient states fall Tuesday, seen in ED for same, reports no fracture, pain no better, took last Oxycodone today No Gabapentin since Tuesday

## 2022-03-06 NOTE — Clinical Note
"Kalee Pradofay Dupree was seen and treated in our emergency department on 3/6/2022.  She may return to work on 03/13/2022.  Please excuse until cleared by orthopedics.      If you have any questions or concerns, please don't hesitate to call.      Sachin Rick MD"

## 2022-03-06 NOTE — ED NOTES
Patient identifiers verified and correct for  Ms Dupree  C/C: Right knee pain SEE NN  APPEARANCE: awake and alert in NAD.  SKIN: warm, dry and intact. No breakdown or bruising.  MUSCULOSKELETAL: Patient moving all extremities spontaneously, no obvious swelling or deformities noted. Ambulates independently.  RESPIRATORY: Denies shortness of breath.Respirations unlabored.   CARDIAC: Denies CP, 2+ distal pulses; no peripheral edema  ABDOMEN: S/ND/NT, Denies nausea  : voids spontaneously, denies difficulty  Neurologic: AAO x 4; follows commands equal strength in all extremities; denies numbness/tingling. Denies dizziness denies weakness, report right outer knee pain with min swelling, no redness

## 2022-03-06 NOTE — ED NOTES
I-STAT Chem-8+ Results:   Value Reference Range   Sodium 141 136-145 mmol/L   Potassium  3.4 3.5-5.1 mmol/L   Chloride 101  mmol/L   Ionized Calcium 1.15 1.06-1.42 mmol/L   CO2 (measured) 28 23-29 mmol/L   Glucose 117  mg/dL   BUN 22 6-30 mg/dL   Creatinine 1.1 0.5-1.4 mg/dL   Hematocrit 41 36-54%

## 2022-03-06 NOTE — ED PROVIDER NOTES
Encounter Date: 3/6/2022       History     Chief Complaint   Patient presents with    Knee Pain     Pt with right knee pain s/p fall on Tues     50 yo female presenting with right knee pain.    PMH:  FADIA on CPAP, DM type 2, depression, seasonal allergies, status post gastric bypass, FSG, hypertension, hyperlipidemia    Context:  The patient fell on .  She states the fall was mechanical after a trip.  She landed on both of her knees.  Denies head injury.  She was evaluated at another hospital and reports her x-rays were negative.  However, she has continued to have pain in her right knee.  She is unable to use crutches, but does have a cane and a knee scooter.  She has been mostly staying in bed due to the pain in her right knee.  Onset:  Acute   Location: right knee   Duration:  Since 3/1  Modifiers:  She took the oxycodone prescribed around 8 this morning and her pain is currently tolerable while she is not bearing weight  Associated Symptoms: denies extremity numbness or weakness     The history is provided by the patient and medical records. No  was used.     Review of patient's allergies indicates:   Allergen Reactions    Amlodipine Nausea And Vomiting     Other reaction(s): Unknown    Amlodipine-benazepril Itching    Azithromycin      Stomach pain, and cramping    Benazepril Nausea And Vomiting     Other reaction(s): Unknown    Hydrocodone      Other reaction(s): Unknown    Hydrocodone-acetaminophen Hives and Itching     Past Medical History:   Diagnosis Date    Allergy     Anemia     Anxiety     Depression     Diabetes mellitus, type 2     Focal segmental glomerulosclerosis     Hyperlipidemia     Hypertension     Lumbar disc disease     Morbid obesity     FADIA on CPAP     Proteinuria     Retinal artery occlusion      Past Surgical History:   Procedure Laterality Date     SECTION       SECTION      CHOLECYSTECTOMY      CORRECTION OF HAMMER TOE Left  6/9/2021    Procedure: LEFT 2-5th metatarsal head resections, hammertoe corrections;  Surgeon: Naima Mistry MD;  Location: [facility] OR;  Service: Orthopedics;  Laterality: Left;    DILATION AND CURETTAGE OF UTERUS      EYE SURGERY      RESECTION OF GASTROCNEMIUS MUSCLE Left 6/9/2021    Procedure: LEFT gastroc recession;  Surgeon: Naima Mistry MD;  Location: [facility] OR;  Service: Orthopedics;  Laterality: Left;  Thigh tourniquet     Family History   Problem Relation Age of Onset    Cancer Mother 56        colon    Diabetes Mother     Hyperlipidemia Mother     Hypertension Mother     Obesity Mother     Kidney disease Mother     Thyroid disease Mother     Colon cancer Mother     Cancer Father         lung    Hypertension Father     Diabetes Brother     Hypertension Brother     Heart disease Paternal Grandfather      Social History     Tobacco Use    Smoking status: Former Smoker     Packs/day: 0.50     Years: 7.00     Pack years: 3.50    Smokeless tobacco: Never Used    Tobacco comment: Quit 1993.   Substance Use Topics    Alcohol use: Yes     Comment: Rare.     Drug use: No     Review of Systems   Constitutional: Negative for fever.   HENT: Negative for facial swelling.    Eyes: Negative for redness.   Respiratory: Negative for shortness of breath.    Gastrointestinal: Negative for abdominal pain.   Musculoskeletal: Positive for arthralgias and joint swelling. Negative for back pain.   Skin: Positive for color change (bruising ).   Allergic/Immunologic: Negative for food allergies.   Neurological: Negative for syncope and numbness.   Hematological: Does not bruise/bleed easily.       Physical Exam     Initial Vitals [03/06/22 0928]   BP Pulse Resp Temp SpO2   (!) 147/96 90 16 98.4 °F (36.9 °C) 98 %      MAP       --         Physical Exam    Nursing note and vitals reviewed.  Constitutional: She is not diaphoretic. No distress.   HENT:   Head: Normocephalic and atraumatic.   Eyes: Right eye exhibits  no discharge. Left eye exhibits no discharge.   Neck: Neck supple. No tracheal deviation present.   Cardiovascular: Normal rate and regular rhythm.   Abdominal: Abdomen is soft. There is no abdominal tenderness.   Musculoskeletal:      Cervical back: Neck supple.      Comments: Left knee:  Ecchymosis, no effusion, FROM, can straight leg raise off stretcher, no ligament laxity, all LLE compartments soft, intact DP    Right knee:  Diffuse ecchymosis, can straight leg raise off stretcher, no ligament laxity, FROM, all RLE compartments soft, intact DP      Neurological: She is alert and oriented to person, place, and time.   Skin: Skin is warm. No rash noted.   Psychiatric: She has a normal mood and affect. Her behavior is normal.         ED Course   Procedures  Labs Reviewed   ISTAT PROCEDURE - Abnormal; Notable for the following components:       Result Value    POC Glucose 117 (*)     POC Potassium 3.4 (*)     All other components within normal limits   HIV 1 / 2 ANTIBODY   HEPATITIS C ANTIBODY          Imaging Results          X-Ray Knee 3 View Left (Final result)  Result time 03/06/22 11:21:01    Final result by Fabricio Nguyễn MD (03/06/22 11:21:01)                 Impression:      No acute displaced fracture-dislocation identified.      Electronically signed by: Fabricio Nguyễn MD  Date:    03/06/2022  Time:    11:21             Narrative:    EXAMINATION:  XR KNEE 3 VIEW LEFT    CLINICAL HISTORY:  Unspecified fall, initial encounter    TECHNIQUE:  AP, lateral, and Merchant views of the left knee were performed.    COMPARISON:  None    FINDINGS:  Bones are well mineralized. Overall alignment is within normal limits. No displaced fracture, dislocation or destructive osseous process.  Prominent ossification arising from the proximal fibular diaphysis posteriorly and extending obliquely towards the posterior and caudal aspect.  No large suprapatellar joint effusion.  Mild tricompartmental degenerative change.  No  subcutaneous emphysema or radiodense retained foreign body.                               CT Knee Without Contrast Right (Final result)  Result time 03/06/22 11:11:10    Final result by Wilver Torres MD (03/06/22 11:11:10)                 Impression:      CT findings suggestive of vertically oriented fracture of the lateral patella.  Bipartite patella felt to be far less likely.  MRI examination could distinguish if indicated although based on the soft tissue swelling and appearance fracture is foremost consideration as above.      Electronically signed by: Wilver Torres MD  Date:    03/06/2022  Time:    11:11             Narrative:    EXAMINATION:  CT KNEE WITHOUT CONTRAST RIGHT    CLINICAL HISTORY:  Knee pain, stress fracture suspected, neg xray;    TECHNIQUE:  CT examination of RIGHT knee with multiplanar reconstructions    COMPARISON:  None    FINDINGS:  There is lucency at the level of the lateral patellar margin, which although a typical location for bipartite patella (anatomic variant) is most likely that of a fracture given its thin irregular margin and overlying soft tissue swelling.  Opposing margins of bipartite patellar usually corticated and smooth unlikely patellar fracture.  Underlying suprapatellar effusion.  Correlation with MRI examination of the contralateral side demonstrates no evidence for bipartite patella.  Fibularis longus muscular atrophy may represent chronic denervation.  Debris/foreign bodies and/or suture material identified in the subcutaneous soft tissues as on axial series 2, image 303.    Articular cortical surfaces are otherwise smooth and regular at the level of the femur and tibia.                                 Medications   oxyCODONE immediate release tablet 5 mg (5 mg Oral Given 3/6/22 1246)     Medical Decision Making:   History:   Old Medical Records: I decided to obtain old medical records.  Old Records Summarized: records from another hospital.       <> Summary of  Records: 3/1 hip xray:  Two views of the right hip demonstrate no definite acute fracture or dislocation  3/1 right knee xray:  No acute displaced fracture or dislocation of the knee.  3/1 shoulder xray:   No convincing acute displaced fracture or dislocation of the left shoulder.  Initial Assessment:   51 year old female presenting with ongoing right knee pain after a recent fall.  Both lower extremities neurovascularly intact.  Will x-ray left knee as well today.  Differential Diagnosis:   Including, but not limited to:  Occult fracture  Ligament injury  Meniscal tear  Knee dislocation  Clinical Tests:   Lab Tests: Reviewed and Ordered  Radiological Study: Ordered and Reviewed  ED Management:  Pursued CT imaging to rule out occult fracture.  CT with vertically oriented fracture of the lateral patella.  Given her traumatic injury and ongoing pain, I feel that a bipartite patella is less likely.  I reviewed the images with Orthopedics.  Patient provided with knee immobilizer and extensive crutch training.  Advise she remain non-weight-bearing until she follows up with Orthopedics outpatient.  I reviewed her  prior to the prescription of prescribed antibiotics, she is not allergic to oxycodone and has run out of her prescription from her prior ED visit.  Will prescribed another short course given her given her imaging findings today.  All questions answered prior to discharge.  Return precautions given.  Other:   I have discussed this case with another health care provider.                      Clinical Impression:   Final diagnoses:  [W19.XXXA] Fall  [M25.561] Acute pain of right knee (Primary)  [S82.001A] Closed nondisplaced fracture of right patella, unspecified fracture morphology, initial encounter          ED Disposition Condition    Discharge Stable        ED Prescriptions     Medication Sig Dispense Start Date End Date Auth. Provider    oxyCODONE (ROXICODONE) 5 MG immediate release tablet Take 1 tablet (5  mg total) by mouth every 6 (six) hours as needed for Pain. 11 tablet 3/6/2022  Sachin Rick MD        Follow-up Information     Follow up With Specialties Details Why Contact Info Additional Information    Devonte Valdez MD Family Medicine In 1 week  [address] [facility] [address] [phone]       [facility] - Emergency Dept Emergency Medicine  As needed, If symptoms worsen [address] [facility] [address] [phone]     [facility] - Orthopedics [facility] Orthopedics In 1 week  [address], [address]th [address]  [facility] [address] [phone] [facility] - Main Building, 5th Floor Please park in [facility] and take Atrium elevator           Sachin Rick MD  03/06/22 3494

## 2022-03-08 ENCOUNTER — PES CALL (OUTPATIENT)
Dept: ADMINISTRATIVE | Facility: CLINIC | Age: 52
End: 2022-03-08
Payer: COMMERCIAL

## 2022-03-09 LAB
HCV AB SERPL QL IA: NEGATIVE
HIV 1+2 AB+HIV1 P24 AG SERPL QL IA: NEGATIVE

## 2022-03-10 ENCOUNTER — OFFICE VISIT (OUTPATIENT)
Dept: ORTHOPEDICS | Facility: CLINIC | Age: 52
End: 2022-03-10
Payer: COMMERCIAL

## 2022-03-10 VITALS — WEIGHT: 293 LBS | BODY MASS INDEX: 44.41 KG/M2 | HEIGHT: 68 IN

## 2022-03-10 DIAGNOSIS — S82.024A CLOSED NONDISPLACED LONGITUDINAL FRACTURE OF RIGHT PATELLA, INITIAL ENCOUNTER: Primary | ICD-10-CM

## 2022-03-10 DIAGNOSIS — M25.561 ACUTE PAIN OF RIGHT KNEE: ICD-10-CM

## 2022-03-10 PROCEDURE — 99213 OFFICE O/P EST LOW 20 MIN: CPT | Mod: S$GLB,,, | Performed by: ORTHOPAEDIC SURGERY

## 2022-03-10 PROCEDURE — 3008F BODY MASS INDEX DOCD: CPT | Mod: CPTII,S$GLB,, | Performed by: ORTHOPAEDIC SURGERY

## 2022-03-10 PROCEDURE — 99213 PR OFFICE/OUTPT VISIT, EST, LEVL III, 20-29 MIN: ICD-10-PCS | Mod: S$GLB,,, | Performed by: ORTHOPAEDIC SURGERY

## 2022-03-10 PROCEDURE — 99999 PR PBB SHADOW E&M-EST. PATIENT-LVL IV: ICD-10-PCS | Mod: PBBFAC,,, | Performed by: ORTHOPAEDIC SURGERY

## 2022-03-10 PROCEDURE — 99999 PR PBB SHADOW E&M-EST. PATIENT-LVL IV: CPT | Mod: PBBFAC,,, | Performed by: ORTHOPAEDIC SURGERY

## 2022-03-10 PROCEDURE — 3008F PR BODY MASS INDEX (BMI) DOCUMENTED: ICD-10-PCS | Mod: CPTII,S$GLB,, | Performed by: ORTHOPAEDIC SURGERY

## 2022-03-10 PROCEDURE — 1160F RVW MEDS BY RX/DR IN RCRD: CPT | Mod: CPTII,S$GLB,, | Performed by: ORTHOPAEDIC SURGERY

## 2022-03-10 PROCEDURE — 1160F PR REVIEW ALL MEDS BY PRESCRIBER/CLIN PHARMACIST DOCUMENTED: ICD-10-PCS | Mod: CPTII,S$GLB,, | Performed by: ORTHOPAEDIC SURGERY

## 2022-03-10 PROCEDURE — 1159F PR MEDICATION LIST DOCUMENTED IN MEDICAL RECORD: ICD-10-PCS | Mod: CPTII,S$GLB,, | Performed by: ORTHOPAEDIC SURGERY

## 2022-03-10 PROCEDURE — 1159F MED LIST DOCD IN RCRD: CPT | Mod: CPTII,S$GLB,, | Performed by: ORTHOPAEDIC SURGERY

## 2022-03-10 NOTE — LETTER
March 10, 2022      [facility] 5th Fl  [address], [address]  [facility] [address]  Phone: [phone]       Patient: Kalee Dupree   YOB: 1970  Date of Visit: 03/10/2022    To Whom It May Concern:    Laurie Dupree  was at [facility] on 03/10/2022. The patient may return to work on 4/1/2022 with no restrictions. If you have any questions or concerns, or if I can be of further assistance, please do not hesitate to contact me.    Sincerely,    Yuan Pate MD

## 2022-03-12 NOTE — PROGRESS NOTES
Subjective:       Patient ID: Kalee Dupree is a 51 y.o. female.    Chief Complaint:   Pain of the Right Knee  She comes in for pain in the right knee.  She has had 2 ED visits for this.  She is in a knee immobilizer, trying to walk without supports because she could not manage crutches.  The patient fell on .  She states the fall was mechanical after a trip. She landed on both of her knees.  However, she has continued to have pain in her right knee.  She has been mostly staying in bed due to the pain in her right knee.  She was seen at 1 facility and had x-rays which were negative.  She was then seen at another facility and had a CT scan that revealed a fracture of her patella.  Her pain is 8/10 today.    Past Medical History:   Diagnosis Date    Allergy     Anemia     Anxiety     Depression     Diabetes mellitus, type 2     Focal segmental glomerulosclerosis     Hyperlipidemia     Hypertension     Lumbar disc disease     Morbid obesity     FADIA on CPAP     Proteinuria     Retinal artery occlusion      Past Surgical History:   Procedure Laterality Date     SECTION       SECTION      CHOLECYSTECTOMY      CORRECTION OF HAMMER TOE Left 2021    Procedure: LEFT 2-5th metatarsal head resections, hammertoe corrections;  Surgeon: Naima Mistry MD;  Location: [facility] OR;  Service: Orthopedics;  Laterality: Left;    DILATION AND CURETTAGE OF UTERUS      EYE SURGERY      RESECTION OF GASTROCNEMIUS MUSCLE Left 2021    Procedure: LEFT gastroc recession;  Surgeon: Naima Mistry MD;  Location: [facility] OR;  Service: Orthopedics;  Laterality: Left;  Thigh tourniquet     Family History   Problem Relation Age of Onset    Cancer Mother 56        colon    Diabetes Mother     Hyperlipidemia Mother     Hypertension Mother     Obesity Mother     Kidney disease Mother     Thyroid disease Mother     Colon cancer Mother     Cancer Father         lung    Hypertension Father      Diabetes Brother     Hypertension Brother     Heart disease Paternal Grandfather      Social History     Socioeconomic History    Marital status:     Number of children: 1   Occupational History     Employer: [employer]   Tobacco Use    Smoking status: Former Smoker     Packs/day: 0.50     Years: 7.00     Pack years: 3.50    Smokeless tobacco: Never Used    Tobacco comment: Quit 1993.   Substance and Sexual Activity    Alcohol use: Yes     Comment: Rare.     Drug use: No    Sexual activity: Yes     Partners: Male   Social History Narrative    ** Merged History Encounter **         Several family members on mother side with aneurysms heart and aorta       Current Outpatient Medications   Medication Sig Dispense Refill    acetaminophen (TYLENOL) 500 MG tablet Take 2 tablets (1,000 mg total) by mouth every 8 (eight) hours as needed for Pain. 90 tablet 0    amitriptyline (ELAVIL) 10 MG tablet TAKE 1 TABLET BY MOUTH EVERY DAY IN THE EVENING 30 tablet 1    aspirin (ECOTRIN) 325 MG EC tablet Take 1 tablet (325 mg total) by mouth 2 (two) times daily. 60 tablet 0    blood sugar diagnostic Strp 1 strip by Misc.(Non-Drug; Combo Route) route once daily. 100 strip 3    buPROPion (WELLBUTRIN XL) 300 MG 24 hr tablet Take 1 tablet (300 mg total) by mouth once daily. 30 tablet 3    ciclopirox 0.77 % Gel Apply topically 2 (two) times daily. 100 g 1    doxycycline (VIBRAMYCIN) 100 MG Cap Take 1 capsule (100 mg total) by mouth 2 (two) times daily. 14 capsule 0    ergocalciferol (ERGOCALCIFEROL) 50,000 unit Cap Take 1 capsule (50,000 Units total) by mouth every 7 days. 12 capsule 3    fluticasone (FLONASE) 50 mcg/actuation nasal spray 1 spray by Each Nare route once daily. 16 g 1    gabapentin (NEURONTIN) 300 MG capsule Take 1 capsule (300 mg total) by mouth every evening. May increase to TID if needed 60 capsule 3    hydroCHLOROthiazide (HYDRODIURIL) 25 MG tablet TAKE 1 TABLET BY MOUTH EVERY  DAY 90 tablet 0    hydrOXYzine (VISTARIL) 100 MG capsule Take 1 capsule (100 mg total) by mouth every evening. 90 capsule 2    lancets Misc 1 lancet by Misc.(Non-Drug; Combo Route) route once daily. 100 each 3    methocarbamol (ROBAXIN) 500 MG Tab Take 2 tablets (1,000 mg total) by mouth 4 (four) times daily as needed (Spasms). 30 tablet 0    mometasone (ELOCON) 0.1 % ointment APPLY TO AFFECTED AREA OF SKIN DAILY AS NEEDED FOR PSORIASIS      oxyCODONE (ROXICODONE) 5 MG immediate release tablet Take 1 tablet (5 mg total) by mouth every 6 (six) hours as needed for Pain. 11 tablet 0    albuterol 90 mcg/actuation inhaler Inhale 2 puffs into the lungs every 6 (six) hours as needed for Wheezing. Rescue 18 g 0    buPROPion (WELLBUTRIN XL) 150 MG TB24 tablet TAKE 1 TABLET BY MOUTH EVERY DAY 90 tablet 1    diltiaZEM (CARDIZEM CD) 180 MG 24 hr capsule Take 1 capsule (180 mg total) by mouth once daily. 30 capsule 11    hydrOXYzine pamoate (VISTARIL) 50 MG Cap TAKE 1 CAPSULE BY MOUTH EVERY EVENING. 90 capsule 1    oxyCODONE-acetaminophen (PERCOCET) 5-325 mg per tablet Take 1 tablet by mouth every 6 (six) hours as needed for Pain. 12 tablet 0     No current facility-administered medications for this visit.     Facility-Administered Medications Ordered in Other Visits   Medication Dose Route Frequency Provider Last Rate Last Admin    fentaNYL 50 mcg/mL injection 25 mcg  25 mcg Intravenous Q5 Min PRN Ant Vargas MD   50 mcg at 06/09/21 0646    midazolam (VERSED) 1 mg/mL injection 0.5 mg  0.5 mg Intravenous PRN Ant Vargas MD   2 mg at 06/09/21 0643     Review of patient's allergies indicates:   Allergen Reactions    Amlodipine Nausea And Vomiting     Other reaction(s): Unknown    Amlodipine-benazepril Itching    Azithromycin      Stomach pain, and cramping    Benazepril Nausea And Vomiting     Other reaction(s): Unknown    Hydrocodone      Other reaction(s): Unknown     "Hydrocodone-acetaminophen Hives and Itching         Objective:      Vitals:    03/10/22 1548   Weight: 136.1 kg (300 lb)   Height: 5' 8" (1.727 m)     Physical Exam  There is no visible deformity.  Active range of motion is 5-95 degrees.  Full passive extension Anterior crepitus with active extension.  Boggy synovitis without effusion.  Lateral patellar tenderness predominates.  No instability to varus/valgus/Lachman's stressing.  No pain in the groin with flexion and internal rotation of the hip which is not limited.  Skin intact.  Distal neurovascular intact.  Flip test negative.    Imaging Review:   CT scan shows a longitudinal fracture of the lateral border of the patella, minimally involving the weight-bearing cartilage area.  This is mildly displaced.  It is not visible on the plain x-rays.  Assessment:   Fracture, right patella  Plan:       I do not think this is something that warrants consideration of surgery.  The treatment will be rest and limited weight-bearing using a walker, which we provided for her today she is encouraged to use cryotherapy as much as possible.  She will stay off work until we see her again in 3 weeks.    The patient's pathophysiology was explained in detail with reference to x-rays, models, other visual aids as appropriate.  Treatment options were discussed in detail.  Questions were invited and answered to the patient's satisfaction.    "

## 2022-03-31 ENCOUNTER — OFFICE VISIT (OUTPATIENT)
Dept: ORTHOPEDICS | Facility: CLINIC | Age: 52
End: 2022-03-31
Payer: COMMERCIAL

## 2022-03-31 DIAGNOSIS — S82.024D CLOSED NONDISPLACED LONGITUDINAL FRACTURE OF RIGHT PATELLA WITH ROUTINE HEALING, SUBSEQUENT ENCOUNTER: Primary | ICD-10-CM

## 2022-03-31 PROCEDURE — 99999 PR PBB SHADOW E&M-EST. PATIENT-LVL III: ICD-10-PCS | Mod: PBBFAC,,, | Performed by: ORTHOPAEDIC SURGERY

## 2022-03-31 PROCEDURE — 99213 PR OFFICE/OUTPT VISIT, EST, LEVL III, 20-29 MIN: ICD-10-PCS | Mod: S$GLB,,, | Performed by: ORTHOPAEDIC SURGERY

## 2022-03-31 PROCEDURE — 1160F PR REVIEW ALL MEDS BY PRESCRIBER/CLIN PHARMACIST DOCUMENTED: ICD-10-PCS | Mod: CPTII,S$GLB,, | Performed by: ORTHOPAEDIC SURGERY

## 2022-03-31 PROCEDURE — 1160F RVW MEDS BY RX/DR IN RCRD: CPT | Mod: CPTII,S$GLB,, | Performed by: ORTHOPAEDIC SURGERY

## 2022-03-31 PROCEDURE — 1159F MED LIST DOCD IN RCRD: CPT | Mod: CPTII,S$GLB,, | Performed by: ORTHOPAEDIC SURGERY

## 2022-03-31 PROCEDURE — 99213 OFFICE O/P EST LOW 20 MIN: CPT | Mod: S$GLB,,, | Performed by: ORTHOPAEDIC SURGERY

## 2022-03-31 PROCEDURE — 1159F PR MEDICATION LIST DOCUMENTED IN MEDICAL RECORD: ICD-10-PCS | Mod: CPTII,S$GLB,, | Performed by: ORTHOPAEDIC SURGERY

## 2022-03-31 PROCEDURE — 99999 PR PBB SHADOW E&M-EST. PATIENT-LVL III: CPT | Mod: PBBFAC,,, | Performed by: ORTHOPAEDIC SURGERY

## 2022-04-04 PROBLEM — S82.024D CLOSED NONDISPLACED LONGITUDINAL FRACTURE OF RIGHT PATELLA WITH ROUTINE HEALING: Status: ACTIVE | Noted: 2022-04-04

## 2022-04-04 NOTE — PROGRESS NOTES
Subjective:      Patient ID: Kalee Dupree is a 51 y.o. female.    Chief Complaint:   Post-op Evaluation of the Right Knee    HPI  She comes back in for her small marginal patella fracture.  Her pain is now down to 4/10, and she is ready to consider returning to work soon.  She would like to be able to use her cane or crutch and the brace at work.  Her pain level is now down to where she feels like she can handle work days.  No new symptoms to report.      Objective:        Ortho/SPM Exam    On exam there is no redness or swelling, no ecchymosis.  Mild tenderness to the lateral border of the patella.  No crepitus with full active extension.  She flexes to about 105°.  This is limited by discomfort.  No effusion.  Distal neurovascular intact.      IMAGING:  None today since her fracture only showed up on CT scan  Assessment:   Doing well status post marginal patellar fracture      Plan:   She will come out of the brace except when she wants to use it for comfort.  She will gradually wean off supports.  She will return to work as indicated in the letter provided.  She will see me again as needed if she does not continue to get better over time.    The patient's pathophysiology was explained in detail with reference to x-rays, models, other visual aids as appropriate.  Treatment options were discussed in detail.  Questions were invited and answered to the patient's satisfaction.    Greater than 50% of this 15 minute visit was devoted to counseling and coordination of care      Yuan Pate MD  Orthopedic Surgery

## 2022-05-07 ENCOUNTER — IMMUNIZATION (OUTPATIENT)
Dept: PRIMARY CARE CLINIC | Facility: CLINIC | Age: 52
End: 2022-05-07
Payer: COMMERCIAL

## 2022-05-07 DIAGNOSIS — Z23 NEED FOR VACCINATION: Primary | ICD-10-CM

## 2022-05-07 PROCEDURE — 0064A COVID-19, MRNA, LNP-S, PF, 100 MCG/0.25 ML DOSE VACCINE (MODERNA BOOSTER): CPT | Mod: CV19,PBBFAC | Performed by: INTERNAL MEDICINE

## 2022-05-07 PROCEDURE — 91306 COVID-19, MRNA, LNP-S, PF, 100 MCG/0.25 ML DOSE VACCINE (MODERNA BOOSTER): CPT | Mod: PBBFAC | Performed by: INTERNAL MEDICINE

## 2022-06-04 ENCOUNTER — LAB VISIT (OUTPATIENT)
Dept: LAB | Facility: HOSPITAL | Age: 52
End: 2022-06-04
Attending: FAMILY MEDICINE
Payer: COMMERCIAL

## 2022-06-04 DIAGNOSIS — Z01.419 WELL WOMAN EXAM: ICD-10-CM

## 2022-06-04 LAB
25(OH)D3+25(OH)D2 SERPL-MCNC: 26 NG/ML (ref 30–96)
ALBUMIN SERPL BCP-MCNC: 3.4 G/DL (ref 3.5–5.2)
ALP SERPL-CCNC: 79 U/L (ref 55–135)
ALT SERPL W/O P-5'-P-CCNC: 19 U/L (ref 10–44)
ANION GAP SERPL CALC-SCNC: 9 MMOL/L (ref 8–16)
AST SERPL-CCNC: 17 U/L (ref 10–40)
BASOPHILS # BLD AUTO: 0.08 K/UL (ref 0–0.2)
BASOPHILS NFR BLD: 0.9 % (ref 0–1.9)
BILIRUB SERPL-MCNC: 0.4 MG/DL (ref 0.1–1)
BUN SERPL-MCNC: 15 MG/DL (ref 6–20)
CALCIUM SERPL-MCNC: 8.8 MG/DL (ref 8.7–10.5)
CHLORIDE SERPL-SCNC: 107 MMOL/L (ref 95–110)
CHOLEST SERPL-MCNC: 188 MG/DL (ref 120–199)
CHOLEST/HDLC SERPL: 4.3 {RATIO} (ref 2–5)
CO2 SERPL-SCNC: 25 MMOL/L (ref 23–29)
CREAT SERPL-MCNC: 1.1 MG/DL (ref 0.5–1.4)
DIFFERENTIAL METHOD: ABNORMAL
EOSINOPHIL # BLD AUTO: 0.2 K/UL (ref 0–0.5)
EOSINOPHIL NFR BLD: 2.5 % (ref 0–8)
ERYTHROCYTE [DISTWIDTH] IN BLOOD BY AUTOMATED COUNT: 13.5 % (ref 11.5–14.5)
EST. GFR  (AFRICAN AMERICAN): >60 ML/MIN/1.73 M^2
EST. GFR  (NON AFRICAN AMERICAN): 58.3 ML/MIN/1.73 M^2
ESTIMATED AVG GLUCOSE: 103 MG/DL (ref 68–131)
GLUCOSE SERPL-MCNC: 95 MG/DL (ref 70–110)
HBA1C MFR BLD: 5.2 % (ref 4–5.6)
HCT VFR BLD AUTO: 38.6 % (ref 37–48.5)
HDLC SERPL-MCNC: 44 MG/DL (ref 40–75)
HDLC SERPL: 23.4 % (ref 20–50)
HGB BLD-MCNC: 12.9 G/DL (ref 12–16)
IMM GRANULOCYTES # BLD AUTO: 0.05 K/UL (ref 0–0.04)
IMM GRANULOCYTES NFR BLD AUTO: 0.6 % (ref 0–0.5)
LDLC SERPL CALC-MCNC: 74.2 MG/DL (ref 63–159)
LYMPHOCYTES # BLD AUTO: 3 K/UL (ref 1–4.8)
LYMPHOCYTES NFR BLD: 34.7 % (ref 18–48)
MCH RBC QN AUTO: 29.3 PG (ref 27–31)
MCHC RBC AUTO-ENTMCNC: 33.4 G/DL (ref 32–36)
MCV RBC AUTO: 88 FL (ref 82–98)
MONOCYTES # BLD AUTO: 0.6 K/UL (ref 0.3–1)
MONOCYTES NFR BLD: 7 % (ref 4–15)
NEUTROPHILS # BLD AUTO: 4.8 K/UL (ref 1.8–7.7)
NEUTROPHILS NFR BLD: 54.3 % (ref 38–73)
NONHDLC SERPL-MCNC: 144 MG/DL
NRBC BLD-RTO: 0 /100 WBC
PLATELET # BLD AUTO: 233 K/UL (ref 150–450)
PMV BLD AUTO: 10.7 FL (ref 9.2–12.9)
POTASSIUM SERPL-SCNC: 3.5 MMOL/L (ref 3.5–5.1)
PROT SERPL-MCNC: 6.8 G/DL (ref 6–8.4)
RBC # BLD AUTO: 4.41 M/UL (ref 4–5.4)
SODIUM SERPL-SCNC: 141 MMOL/L (ref 136–145)
T3 SERPL-MCNC: 74 NG/DL (ref 60–180)
T4 FREE SERPL-MCNC: 0.83 NG/DL (ref 0.71–1.51)
TRIGL SERPL-MCNC: 349 MG/DL (ref 30–150)
TSH SERPL DL<=0.005 MIU/L-ACNC: 0.89 UIU/ML (ref 0.4–4)
WBC # BLD AUTO: 8.74 K/UL (ref 3.9–12.7)

## 2022-06-04 PROCEDURE — 84439 ASSAY OF FREE THYROXINE: CPT | Performed by: FAMILY MEDICINE

## 2022-06-04 PROCEDURE — 84443 ASSAY THYROID STIM HORMONE: CPT | Performed by: FAMILY MEDICINE

## 2022-06-04 PROCEDURE — 84480 ASSAY TRIIODOTHYRONINE (T3): CPT | Performed by: FAMILY MEDICINE

## 2022-06-04 PROCEDURE — 80061 LIPID PANEL: CPT | Performed by: FAMILY MEDICINE

## 2022-06-04 PROCEDURE — 82306 VITAMIN D 25 HYDROXY: CPT | Performed by: FAMILY MEDICINE

## 2022-06-04 PROCEDURE — 83036 HEMOGLOBIN GLYCOSYLATED A1C: CPT | Performed by: FAMILY MEDICINE

## 2022-06-04 PROCEDURE — 85025 COMPLETE CBC W/AUTO DIFF WBC: CPT | Performed by: FAMILY MEDICINE

## 2022-06-04 PROCEDURE — 36415 COLL VENOUS BLD VENIPUNCTURE: CPT | Performed by: FAMILY MEDICINE

## 2022-06-04 PROCEDURE — 80053 COMPREHEN METABOLIC PANEL: CPT | Performed by: FAMILY MEDICINE

## 2022-09-22 ENCOUNTER — OFFICE VISIT (OUTPATIENT)
Dept: PODIATRY | Facility: CLINIC | Age: 52
End: 2022-09-22
Payer: COMMERCIAL

## 2022-09-22 VITALS — BODY MASS INDEX: 44.41 KG/M2 | WEIGHT: 293 LBS | HEIGHT: 68 IN

## 2022-09-22 DIAGNOSIS — E11.49 TYPE II DIABETES MELLITUS WITH NEUROLOGICAL MANIFESTATIONS: Primary | ICD-10-CM

## 2022-09-22 DIAGNOSIS — M25.572 ACUTE LEFT ANKLE PAIN: ICD-10-CM

## 2022-09-22 DIAGNOSIS — M79.672 LEFT FOOT PAIN: ICD-10-CM

## 2022-09-22 PROCEDURE — 99214 PR OFFICE/OUTPT VISIT, EST, LEVL IV, 30-39 MIN: ICD-10-PCS | Mod: S$GLB,,, | Performed by: PODIATRIST

## 2022-09-22 PROCEDURE — 3044F HG A1C LEVEL LT 7.0%: CPT | Mod: CPTII,S$GLB,, | Performed by: PODIATRIST

## 2022-09-22 PROCEDURE — 99214 OFFICE O/P EST MOD 30 MIN: CPT | Mod: S$GLB,,, | Performed by: PODIATRIST

## 2022-09-22 PROCEDURE — 3008F BODY MASS INDEX DOCD: CPT | Mod: CPTII,S$GLB,, | Performed by: PODIATRIST

## 2022-09-22 PROCEDURE — 3008F PR BODY MASS INDEX (BMI) DOCUMENTED: ICD-10-PCS | Mod: CPTII,S$GLB,, | Performed by: PODIATRIST

## 2022-09-22 PROCEDURE — 99999 PR PBB SHADOW E&M-EST. PATIENT-LVL IV: ICD-10-PCS | Mod: PBBFAC,,, | Performed by: PODIATRIST

## 2022-09-22 PROCEDURE — 99999 PR PBB SHADOW E&M-EST. PATIENT-LVL IV: CPT | Mod: PBBFAC,,, | Performed by: PODIATRIST

## 2022-09-22 PROCEDURE — 1159F MED LIST DOCD IN RCRD: CPT | Mod: CPTII,S$GLB,, | Performed by: PODIATRIST

## 2022-09-22 PROCEDURE — 3044F PR MOST RECENT HEMOGLOBIN A1C LEVEL <7.0%: ICD-10-PCS | Mod: CPTII,S$GLB,, | Performed by: PODIATRIST

## 2022-09-22 PROCEDURE — 1159F PR MEDICATION LIST DOCUMENTED IN MEDICAL RECORD: ICD-10-PCS | Mod: CPTII,S$GLB,, | Performed by: PODIATRIST

## 2022-09-24 ENCOUNTER — HOSPITAL ENCOUNTER (OUTPATIENT)
Dept: RADIOLOGY | Facility: HOSPITAL | Age: 52
Discharge: HOME | End: 2022-09-24
Attending: PODIATRIST
Payer: COMMERCIAL

## 2022-09-24 DIAGNOSIS — M25.572 ACUTE LEFT ANKLE PAIN: ICD-10-CM

## 2022-09-24 DIAGNOSIS — M79.672 LEFT FOOT PAIN: ICD-10-CM

## 2022-09-24 PROCEDURE — 73610 X-RAY EXAM OF ANKLE: CPT | Mod: 26,LT,, | Performed by: RADIOLOGY

## 2022-09-24 PROCEDURE — 73610 X-RAY EXAM OF ANKLE: CPT | Mod: TC,LT

## 2022-09-24 PROCEDURE — 73630 XR FOOT COMPLETE 3 VIEW LEFT: ICD-10-PCS | Mod: 26,LT,, | Performed by: RADIOLOGY

## 2022-09-24 PROCEDURE — 73630 X-RAY EXAM OF FOOT: CPT | Mod: 26,LT,, | Performed by: RADIOLOGY

## 2022-09-24 PROCEDURE — 73610 XR ANKLE COMPLETE 3 VIEW LEFT: ICD-10-PCS | Mod: 26,LT,, | Performed by: RADIOLOGY

## 2022-09-24 PROCEDURE — 73630 X-RAY EXAM OF FOOT: CPT | Mod: TC,LT

## 2022-09-27 NOTE — PROGRESS NOTES
Subjective:      Patient ID: Kalee Dupree is a 51 y.o. female.    Chief Complaint: Diabetes Mellitus, Diabetic Foot Exam, and Foot Swelling    Kalee is a 51 y.o. female who presents to the podiatry clinic  with complaint of  left foot.ankle swelling/ pain. Onset of the symptoms was several weeks ago. Precipitating event: none known. Current symptoms include: ability to bear weight, but with some pain. Aggravating factors: any weight bearing. Symptoms have progressed to a point and plateaued. Patient has had no prior foot problems. Evaluation to date: none. Treatment to date: none. Patients rates pain 4/10 on pain scale.    Review of Systems   Constitutional: Negative for chills.   Cardiovascular:  Negative for chest pain and claudication.   Respiratory:  Negative for cough.    Skin:  Positive for color change, dry skin and nail changes.   Musculoskeletal:  Positive for joint pain.   Gastrointestinal:  Negative for nausea.   Neurological:  Positive for paresthesias. Negative for numbness.   Psychiatric/Behavioral:  The patient is not nervous/anxious.          Objective:      Physical Exam  Constitutional:       Appearance: She is well-developed.      Comments: Oriented to time, place, and person.   Cardiovascular:      Comments: DP and PT pulses are palpable bilaterally. 3 sec capillary refill time and toes and feet are warm to touch proximally .  There is  hair growth on the feet and toes b/l. There is no edema b/l. No spider veins or varicosities present b/l.     Musculoskeletal:      Comments: Equinus noted b/l ankles with < 10 deg DF noted. MMT 5/5 in DF/PF/Inv/Ev resistance with no reproduction of pain in any direction. Passive range of motion of ankle and pedal joints is painless b/l.     Feet:      Right foot:      Skin integrity: No callus or dry skin.      Left foot:      Skin integrity: No callus or dry skin.   Lymphadenopathy:      Comments: Negative lymphadenopathy bilateral popliteal fossa and tarsal  tunnel.   Skin:     Comments: No open lesions, lacerations or wounds noted.Interdigital spaces clean, dry and intact b/l. No erythema noted to b/l foot.  Nails normal color and trophic qualities.     Neurological:      Mental Status: She is alert.      Comments: Light touch, proprioception, and sharp/dull sensation are all intact bilaterally. Protective threshold with the Norris-Wienstein monofilament is intact bilaterally.    Psychiatric:         Behavior: Behavior is cooperative.             Assessment:       Encounter Diagnoses   Name Primary?    Left foot pain     Acute left ankle pain     Type II diabetes mellitus with neurological manifestations Yes         Plan:       Kalee was seen today for diabetes mellitus, diabetic foot exam and foot swelling.    Diagnoses and all orders for this visit:    Type II diabetes mellitus with neurological manifestations    Left foot pain  -     X-Ray Foot Complete Left; Future  -     X-Ray Ankle Complete Left; Future    Acute left ankle pain  -     X-Ray Ankle Complete Left; Future    I counseled the patient on her conditions, their implications and medical management.      Left foot/ankle xray to assess underlying deformity and for underlying osseous pathology.     CAM boot dispensed and applied to affected foot. Advised to use at all times while weightbearing and ambulating even for few minutes. Advised to use sneaker style shoe in opposite foot to maintain balance. Ok to remove  at night but reapply if patient is to get up in the middle of the night. Verbalized understanding. Advised to use for 3 -4 weeks.     Instructed patient to use rolling knee scooter.     RTC PRN.

## 2023-01-03 ENCOUNTER — IMMUNIZATION (OUTPATIENT)
Dept: PHARMACY | Facility: CLINIC | Age: 53
End: 2023-01-03
Payer: COMMERCIAL

## 2023-01-03 DIAGNOSIS — Z23 NEED FOR VACCINATION: Primary | ICD-10-CM

## 2023-06-01 ENCOUNTER — TELEPHONE (OUTPATIENT)
Dept: ENDOSCOPY | Facility: HOSPITAL | Age: 53
End: 2023-06-01
Payer: COMMERCIAL

## 2023-06-01 VITALS — BODY MASS INDEX: 44.41 KG/M2 | HEIGHT: 68 IN | WEIGHT: 293 LBS

## 2023-06-01 DIAGNOSIS — Z12.11 SPECIAL SCREENING FOR MALIGNANT NEOPLASMS, COLON: Primary | ICD-10-CM

## 2023-06-01 RX ORDER — SODIUM, POTASSIUM,MAG SULFATES 17.5-3.13G
1 SOLUTION, RECONSTITUTED, ORAL ORAL DAILY
Qty: 1 KIT | Refills: 0 | Status: SHIPPED | OUTPATIENT
Start: 2023-06-01 | End: 2023-06-03

## 2023-07-20 ENCOUNTER — ANESTHESIA (OUTPATIENT)
Dept: ENDOSCOPY | Facility: HOSPITAL | Age: 53
End: 2023-07-20
Payer: COMMERCIAL

## 2023-07-20 ENCOUNTER — ANESTHESIA EVENT (OUTPATIENT)
Dept: ENDOSCOPY | Facility: HOSPITAL | Age: 53
End: 2023-07-20
Payer: COMMERCIAL

## 2023-07-20 ENCOUNTER — HOSPITAL ENCOUNTER (OUTPATIENT)
Facility: HOSPITAL | Age: 53
Discharge: HOME | End: 2023-07-20
Attending: INTERNAL MEDICINE | Admitting: INTERNAL MEDICINE
Payer: COMMERCIAL

## 2023-07-20 VITALS
HEART RATE: 64 BPM | HEIGHT: 68 IN | SYSTOLIC BLOOD PRESSURE: 150 MMHG | TEMPERATURE: 98 F | DIASTOLIC BLOOD PRESSURE: 76 MMHG | WEIGHT: 293 LBS | RESPIRATION RATE: 24 BRPM | BODY MASS INDEX: 44.41 KG/M2 | OXYGEN SATURATION: 100 %

## 2023-07-20 DIAGNOSIS — Z80.0 FAMILY HISTORY OF COLON CANCER: Primary | ICD-10-CM

## 2023-07-20 LAB — POCT GLUCOSE: 82 MG/DL (ref 70–110)

## 2023-07-20 PROCEDURE — 45385 COLONOSCOPY W/LESION REMOVAL: CPT | Mod: PT | Performed by: INTERNAL MEDICINE

## 2023-07-20 PROCEDURE — D9220A PRA ANESTHESIA: ICD-10-PCS | Mod: 33,CRNA,, | Performed by: NURSE ANESTHETIST, CERTIFIED REGISTERED

## 2023-07-20 PROCEDURE — 27201089 HC SNARE, DISP (ANY): Performed by: INTERNAL MEDICINE

## 2023-07-20 PROCEDURE — 37000009 HC ANESTHESIA EA ADD 15 MINS: Performed by: INTERNAL MEDICINE

## 2023-07-20 PROCEDURE — 82962 GLUCOSE BLOOD TEST: CPT | Performed by: INTERNAL MEDICINE

## 2023-07-20 PROCEDURE — 88305 TISSUE EXAM BY PATHOLOGIST: CPT | Mod: 26,,, | Performed by: PATHOLOGY

## 2023-07-20 PROCEDURE — 25000003 PHARM REV CODE 250: Performed by: NURSE ANESTHETIST, CERTIFIED REGISTERED

## 2023-07-20 PROCEDURE — D9220A PRA ANESTHESIA: Mod: 33,ANES,, | Performed by: ANESTHESIOLOGY

## 2023-07-20 PROCEDURE — 88305 TISSUE EXAM BY PATHOLOGIST: CPT | Performed by: PATHOLOGY

## 2023-07-20 PROCEDURE — 37000008 HC ANESTHESIA 1ST 15 MINUTES: Performed by: INTERNAL MEDICINE

## 2023-07-20 PROCEDURE — 88305 TISSUE EXAM BY PATHOLOGIST: ICD-10-PCS | Mod: 26,,, | Performed by: PATHOLOGY

## 2023-07-20 PROCEDURE — D9220A PRA ANESTHESIA: ICD-10-PCS | Mod: 33,ANES,, | Performed by: ANESTHESIOLOGY

## 2023-07-20 PROCEDURE — D9220A PRA ANESTHESIA: Mod: 33,CRNA,, | Performed by: NURSE ANESTHETIST, CERTIFIED REGISTERED

## 2023-07-20 PROCEDURE — 45385 PR COLONOSCOPY,REMV LESN,SNARE: ICD-10-PCS | Mod: 33,,, | Performed by: INTERNAL MEDICINE

## 2023-07-20 PROCEDURE — 25000003 PHARM REV CODE 250: Performed by: INTERNAL MEDICINE

## 2023-07-20 PROCEDURE — 63600175 PHARM REV CODE 636 W HCPCS: Performed by: NURSE ANESTHETIST, CERTIFIED REGISTERED

## 2023-07-20 PROCEDURE — 45385 COLONOSCOPY W/LESION REMOVAL: CPT | Mod: 33,,, | Performed by: INTERNAL MEDICINE

## 2023-07-20 RX ORDER — LIDOCAINE HYDROCHLORIDE 20 MG/ML
INJECTION INTRAVENOUS
Status: DISCONTINUED | OUTPATIENT
Start: 2023-07-20 | End: 2023-07-20

## 2023-07-20 RX ORDER — PROPOFOL 10 MG/ML
VIAL (ML) INTRAVENOUS CONTINUOUS PRN
Status: DISCONTINUED | OUTPATIENT
Start: 2023-07-20 | End: 2023-07-20

## 2023-07-20 RX ORDER — ONDANSETRON 2 MG/ML
4 INJECTION INTRAMUSCULAR; INTRAVENOUS ONCE AS NEEDED
Status: DISCONTINUED | OUTPATIENT
Start: 2023-07-20 | End: 2023-07-20 | Stop reason: HOSPADM

## 2023-07-20 RX ORDER — SODIUM CHLORIDE 9 MG/ML
INJECTION, SOLUTION INTRAVENOUS CONTINUOUS
Status: DISCONTINUED | OUTPATIENT
Start: 2023-07-20 | End: 2023-07-20 | Stop reason: HOSPADM

## 2023-07-20 RX ADMIN — LIDOCAINE HYDROCHLORIDE 100 MG: 20 INJECTION INTRAVENOUS at 12:07

## 2023-07-20 RX ADMIN — PROPOFOL 125 MCG/KG/MIN: 10 INJECTION, EMULSION INTRAVENOUS at 12:07

## 2023-07-20 RX ADMIN — SODIUM CHLORIDE: 9 INJECTION, SOLUTION INTRAVENOUS at 12:07

## 2023-07-20 NOTE — TRANSFER OF CARE
"Anesthesia Transfer of Care Note    Patient: Kalee Dupree    Procedure(s) Performed: Procedure(s) (LRB):  COLONOSCOPY (N/A)    Patient location: PACU    Anesthesia Type: general    Transport from OR: Transported from OR on room air with adequate spontaneous ventilation    Post pain: adequate analgesia    Post assessment: no apparent anesthetic complications    Post vital signs: stable    Level of consciousness: awake and alert    Nausea/Vomiting: no nausea/vomiting    Complications: none    Transfer of care protocol was followed      Last vitals:   Visit Vitals  /99 (Patient Position: Lying)   Pulse 82   Temp 36.1 °C (97 °F) (Temporal)   Resp 16   Ht 5' 8" (1.727 m)   Wt (!) 139.3 kg (307 lb)   LMP 06/30/2015   SpO2 98%   Breastfeeding No   BMI 46.68 kg/m²     "

## 2023-07-20 NOTE — PROGRESS NOTES
Patient discharged to home via wheelchair, escorted by . Pt alert and talkative, vitals stable, tolerating PO intake. Discharge instructions (written and verbal) and follow-up information given to patient who verbalized understanding, as well as a readiness for discharge. [facility] for additional questions following discharge. Patient being discharged, transport via wheelchair to vehicle.

## 2023-07-20 NOTE — ANESTHESIA POSTPROCEDURE EVALUATION
Anesthesia Post Evaluation    Patient: Kalee Dupree    Procedure(s) Performed: Procedure(s) (LRB):  COLONOSCOPY (N/A)    Final Anesthesia Type: general      Patient location during evaluation: PACU  Patient participation: Yes- Able to Participate  Level of consciousness: awake and alert  Post-procedure vital signs: reviewed and stable  Pain management: adequate  Airway patency: patent    PONV status at discharge: No PONV  Anesthetic complications: no      Cardiovascular status: blood pressure returned to baseline  Respiratory status: unassisted  Hydration status: euvolemic  Follow-up not needed.          Vitals Value Taken Time   /76 07/20/23 1400   Temp 36.6 °C (97.9 °F) 07/20/23 1322   Pulse 64 07/20/23 1400   Resp 24 07/20/23 1400   SpO2 100 % 07/20/23 1400         No case tracking events are documented in the log.      Pain/Kalli Score: Kalli Score: 10 (7/20/2023  1:30 PM)

## 2023-07-20 NOTE — PROVATION PATIENT INSTRUCTIONS
Discharge Summary/Instructions after an Endoscopic Procedure  Patient Name: Kalee Dupree  Patient MRN: [id number]  Patient YOB: 1970 Thursday, July 20, 2023  Juan Morales MD  Dear patient,  As a result of recent federal legislation (The Federal Cures Act), you may   receive lab or pathology results from your procedure in your [facility]   account before your physician is able to contact you. Your physician or   their representative will relay the results to you with their   recommendations at their soonest availability.  Thank you,  RESTRICTIONS:  During your procedure today, you received medications for sedation.  These   medications may affect your judgment, balance and coordination.  Therefore,   for 24 hours, you have the following restrictions:   - DO NOT drive a car, operate machinery, make legal/financial decisions,   sign important papers or drink alcohol.    ACTIVITY:  Today: no heavy lifting, straining or running due to procedural   sedation/anesthesia.  The following day: return to full activity including work.  DIET:  Eat and drink normally unless instructed otherwise.     TREATMENT FOR COMMON SIDE EFFECTS:  - Mild abdominal pain, nausea, belching, bloating or excessive gas:  rest,   eat lightly and use a heating pad.  - Sore Throat: treat with throat lozenges and/or gargle with warm salt   water.  - Because air was used during the procedure, expelling large amounts of air   from your rectum or belching is normal.  - If a bowel prep was taken, you may not have a bowel movement for 1-3 days.    This is normal.  SYMPTOMS TO WATCH FOR AND REPORT TO YOUR PHYSICIAN:  1. Abdominal pain or bloating, other than gas cramps.  2. Chest pain.  3. Back pain.  4. Signs of infection such as: chills or fever occurring within 24 hours   after the procedure.  5. Rectal bleeding, which would show as bright red, maroon, or black stools.   (A tablespoon of blood from the rectum is not serious, especially  if   hemorrhoids are present.)  6. Vomiting.  7. Weakness or dizziness.  GO DIRECTLY TO THE NEAREST EMERGENCY ROOM IF YOU HAVE ANY OF THE FOLLOWING:      Difficulty breathing              Chills and/or fever over 101 F   Persistent vomiting and/or vomiting blood   Severe abdominal pain   Severe chest pain   Black, tarry stools   Bleeding- more than one tablespoon   Any other symptom or condition that you feel may need urgent attention  Your doctor recommends these additional instructions:  If any biopsies were taken, your doctors clinic will contact you in 1 to 2   weeks with any results.  - Discharge patient to home.   - Patient has a contact number available for emergencies.  The signs and   symptoms of potential delayed complications were discussed with the   patient.  Return to normal activities tomorrow.  Written discharge   instructions were provided to the patient.   - Resume previous diet.   - Continue present medications.   - Await pathology results.   - Repeat colonoscopy in 3 years for surveillance of multiple polyps.  For questions, problems or results please call your physician - Juan Morales MD at Work:  [phone].  [facility], EMERGENCY ROOM PHONE NUMBER: [phone]  IF A COMPLICATION OR EMERGENCY SITUATION ARISES AND YOU ARE UNABLE TO REACH   YOUR PHYSICIAN - GO DIRECTLY TO THE EMERGENCY ROOM.  Juan Morales MD  7/20/2023 1:35:23 PM  This report has been verified and signed electronically.  Dear patient,  As a result of recent federal legislation (The Federal Cures Act), you may   receive lab or pathology results from your procedure in your [facility]   account before your physician is able to contact you. Your physician or   their representative will relay the results to you with their   recommendations at their soonest availability.  Thank you,  PROVATION

## 2023-07-20 NOTE — H&P
Short Stay Endoscopy History and Physical      Procedure - Colonoscopy  ASA - per anesthesia  Mallampati - per anesthesia  History of Anesthesia problems - no  Family history Anesthesia problems - no   Plan of anesthesia - MAC    HPI:  This is a 52 y.o. female here for colon cancer screening.       ROS:  Constitutional: No fevers, chills  CV: No chest pain  Pulm: No cough, No shortness of breath  GI: see HPI    Medical History:  has a past medical history of Allergy, Anemia, Anxiety, Depression, Diabetes mellitus, type 2, Focal segmental glomerulosclerosis, Hyperlipidemia, Hypertension, Lumbar disc disease, Morbid obesity, FADIA on CPAP, Proteinuria, and Retinal artery occlusion.    Surgical History:  has a past surgical history that includes  section; Cholecystectomy;  section; Eye surgery; Dilation and curettage of uterus; Correction of hammer toe (Left, 2021); and Resection of gastrocnemius muscle (Left, 2021).    Family History: family history includes Cancer in her father; Cancer (age of onset: 56) in her mother; Colon cancer in her mother; Diabetes in her brother and mother; Heart disease in her paternal grandfather; Hyperlipidemia in her mother; Hypertension in her brother, father, and mother; Kidney disease in her mother; Obesity in her mother; Thyroid disease in her mother.    Social History:  reports that she has quit smoking. She has a 3.50 pack-year smoking history. She has never used smokeless tobacco. She reports current alcohol use. She reports that she does not use drugs.    Review of patient's allergies indicates:   Allergen Reactions    Amlodipine Nausea And Vomiting     Other reaction(s): Unknown    Amlodipine-benazepril Itching    Azithromycin      Stomach pain, and cramping    Benazepril Nausea And Vomiting     Other reaction(s): Unknown    Hydrocodone      Other reaction(s): Unknown    Hydrocodone-acetaminophen Hives and Itching       Medications:   Medications Prior  to Admission   Medication Sig Dispense Refill Last Dose    buPROPion (WELLBUTRIN XL) 150 MG TB24 tablet Take 1 tablet (150 mg total) by mouth once daily. 90 tablet 1 7/20/2023    buPROPion (WELLBUTRIN XL) 300 MG 24 hr tablet Take 1 tablet (300 mg total) by mouth once daily. 90 tablet 1 7/20/2023    diltiaZEM (CARDIZEM CD) 180 MG 24 hr capsule Take 1 capsule (180 mg total) by mouth once daily. 90 capsule 1 7/20/2023    ergocalciferol (ERGOCALCIFEROL) 50,000 unit Cap Take 1 capsule (50,000 Units total) by mouth every 7 days. 12 capsule 3 Past Week    gabapentin (NEURONTIN) 300 MG capsule PO TID prn 90 capsule 3 7/19/2023    hydroCHLOROthiazide (HYDRODIURIL) 25 MG tablet Take 1 tablet (25 mg total) by mouth once daily. 90 tablet 1 7/20/2023    hydrOXYzine (VISTARIL) 100 MG capsule Take 1 capsule (100 mg total) by mouth every evening. 90 capsule 2 7/19/2023    acetaminophen (TYLENOL) 500 MG tablet Take 2 tablets (1,000 mg total) by mouth every 8 (eight) hours as needed for Pain. 90 tablet 0     albuterol 90 mcg/actuation inhaler Inhale 2 puffs into the lungs every 6 (six) hours as needed for Wheezing. Rescue 18 g 0     amitriptyline (ELAVIL) 10 MG tablet TAKE 1 TABLET BY MOUTH EVERY DAY IN THE EVENING 30 tablet 1     amitriptyline (ELAVIL) 25 MG tablet Take 1 tablet (25 mg total) by mouth nightly as needed for Insomnia. 90 tablet 1     aspirin (ECOTRIN) 325 MG EC tablet Take 1 tablet (325 mg total) by mouth 2 (two) times daily. 60 tablet 0     blood sugar diagnostic Strp 1 strip by Misc.(Non-Drug; Combo Route) route once daily. 100 strip 3     ciclopirox 0.77 % Gel Apply topically 2 (two) times daily. 100 g 1     doxycycline (VIBRAMYCIN) 100 MG Cap Take 1 capsule (100 mg total) by mouth 2 (two) times daily. 14 capsule 0     ergocalciferol (ERGOCALCIFEROL) 50,000 unit Cap Take 1 capsule (50,000 Units total) by mouth every 7 days. 12 capsule 3     fluticasone (FLONASE) 50 mcg/actuation nasal spray 1 spray by Each Nare  route once daily. 16 g 1     hydrOXYzine pamoate (VISTARIL) 50 MG Cap TAKE 1 CAPSULE BY MOUTH EVERY EVENING. 90 capsule 1     lancets Misc 1 lancet by Misc.(Non-Drug; Combo Route) route once daily. 100 each 3     methocarbamol (ROBAXIN) 500 MG Tab Take 2 tablets (1,000 mg total) by mouth 4 (four) times daily as needed (Spasms). 30 tablet 0     mometasone (ELOCON) 0.1 % ointment APPLY TO AFFECTED AREA OF SKIN DAILY AS NEEDED FOR PSORIASIS       mometasone 0.1% (ELOCON) 0.1 % cream Apply topically once daily. 15 g 0     oxyCODONE (ROXICODONE) 5 MG immediate release tablet Take 1 tablet (5 mg total) by mouth every 6 (six) hours as needed for Pain. 11 tablet 0     oxyCODONE-acetaminophen (PERCOCET) 5-325 mg per tablet Take 1 tablet by mouth every 6 (six) hours as needed for Pain. 12 tablet 0          Physical Exam:    Vital Signs:   Vitals:    07/20/23 1157   BP: (!) 158/94   Pulse: 82   Resp: 16   Temp: 97 °F (36.1 °C)       General Appearance: Well appearing in no acute distress  Eyes:    No scleral icterus  Lungs: CTA bilaterally  Heart:  reg rate and rhythm   Abdomen: Soft, non tender, non distended with positive bowel sounds      Labs:  Lab Results   Component Value Date    WBC 8.74 06/04/2022    HGB 12.9 06/04/2022    HCT 38.6 06/04/2022    MCV 88 06/04/2022     06/04/2022        BMP  Lab Results   Component Value Date     06/04/2022    K 3.5 06/04/2022     06/04/2022    CO2 25 06/04/2022    BUN 15 06/04/2022    CREATININE 1.1 06/04/2022    CALCIUM 8.8 06/04/2022    ANIONGAP 9 06/04/2022    ESTGFRAFRICA >60.0 06/04/2022    EGFRNONAA 58.3 (A) 06/04/2022     No results found for: INR, PROTIME       Assessment:  52 y.o. female with family history of colon cancer.     Plan:  Proceed with colonoscopy today.  I have explained the risks and benefits of endoscopy procedures to the patient including but not limited to bleeding, perforation, infection, and death.  All questions and answered.         Juan Morales MD

## 2023-07-20 NOTE — ANESTHESIA PREPROCEDURE EVALUATION
2023  Kalee Dupree is a 52 y.o., female.  [facility]  Anesthesia Pre-Operative Evaluation         Patient Name/: Kalee Dupree, 1970  MRN: [id number]    SUBJECTIVE:     Pre-operative evaluation for Procedure(s) (LRB):  COLONOSCOPY (N/A)     2023    Kalee Dupree is a 52 y.o. female w/ a significant PMHx of Type2 DM, morbid obesity,FADIA,     Patient now presents for the above procedure(s).    ________________________________________  No results found for this or any previous visit.    ________________________________________    Prev airway: None documented.    LDA: None documented.       Peripheral IV - Single Lumen 22 1018 20 G Right Antecubital (Active)   Number of days: 501            Peripheral IV - Single Lumen 23 1202 20 G Right Hand (Active)   Site Assessment Clean;Dry;Intact 23 1201   Line Status Flushed 23 1201   Dressing Status Clean;Dry;Intact 23 1201   Number of days: 0       Drips: None documented.   sodium chloride 0.9% 10 mL/hr at 23 1201       Patient Active Problem List   Diagnosis    Focal segmental glomerulosclerosis    Proteinuria    Chest pain, atypical    Mixed stress and urge urinary incontinence    Type 2 diabetes mellitus without complication, without long-term current use of insulin    Hypertensive heart disease with diastolic heart failure    Hypertriglyceridemia    Morbid obesity with BMI of 45.0-49.9, adult    FADIA on CPAP    Osteoarthritis of spine with radiculopathy, lumbar region    Depression with anxiety    Seasonal allergic rhinitis    Preop examination    Closed nondisplaced longitudinal fracture of right patella with routine healing       Review of patient's allergies indicates:   Allergen Reactions    Amlodipine Nausea And Vomiting     Other reaction(s): Unknown     Amlodipine-benazepril Itching    Azithromycin      Stomach pain, and cramping    Benazepril Nausea And Vomiting     Other reaction(s): Unknown    Hydrocodone      Other reaction(s): Unknown    Hydrocodone-acetaminophen Hives and Itching       Current Inpatient Medications:       Current Facility-Administered Medications on File Prior to Encounter   Medication Dose Route Frequency Provider Last Rate Last Admin    fentaNYL 50 mcg/mL injection 25 mcg  25 mcg Intravenous Q5 Min PRN Ant Vargas MD   50 mcg at 06/09/21 0646    midazolam (VERSED) 1 mg/mL injection 0.5 mg  0.5 mg Intravenous PRN Ant Vargas MD   2 mg at 06/09/21 0643     Current Outpatient Medications on File Prior to Encounter   Medication Sig Dispense Refill    buPROPion (WELLBUTRIN XL) 150 MG TB24 tablet Take 1 tablet (150 mg total) by mouth once daily. 90 tablet 1    buPROPion (WELLBUTRIN XL) 300 MG 24 hr tablet Take 1 tablet (300 mg total) by mouth once daily. 90 tablet 1    diltiaZEM (CARDIZEM CD) 180 MG 24 hr capsule Take 1 capsule (180 mg total) by mouth once daily. 90 capsule 1    ergocalciferol (ERGOCALCIFEROL) 50,000 unit Cap Take 1 capsule (50,000 Units total) by mouth every 7 days. 12 capsule 3    gabapentin (NEURONTIN) 300 MG capsule PO TID prn 90 capsule 3    hydroCHLOROthiazide (HYDRODIURIL) 25 MG tablet Take 1 tablet (25 mg total) by mouth once daily. 90 tablet 1    hydrOXYzine (VISTARIL) 100 MG capsule Take 1 capsule (100 mg total) by mouth every evening. 90 capsule 2    acetaminophen (TYLENOL) 500 MG tablet Take 2 tablets (1,000 mg total) by mouth every 8 (eight) hours as needed for Pain. 90 tablet 0    albuterol 90 mcg/actuation inhaler Inhale 2 puffs into the lungs every 6 (six) hours as needed for Wheezing. Rescue 18 g 0    amitriptyline (ELAVIL) 10 MG tablet TAKE 1 TABLET BY MOUTH EVERY DAY IN THE EVENING 30 tablet 1    amitriptyline (ELAVIL) 25 MG tablet Take 1 tablet (25 mg total) by  mouth nightly as needed for Insomnia. 90 tablet 1    aspirin (ECOTRIN) 325 MG EC tablet Take 1 tablet (325 mg total) by mouth 2 (two) times daily. 60 tablet 0    blood sugar diagnostic Strp 1 strip by Misc.(Non-Drug; Combo Route) route once daily. 100 strip 3    ciclopirox 0.77 % Gel Apply topically 2 (two) times daily. 100 g 1    doxycycline (VIBRAMYCIN) 100 MG Cap Take 1 capsule (100 mg total) by mouth 2 (two) times daily. 14 capsule 0    ergocalciferol (ERGOCALCIFEROL) 50,000 unit Cap Take 1 capsule (50,000 Units total) by mouth every 7 days. 12 capsule 3    fluticasone (FLONASE) 50 mcg/actuation nasal spray 1 spray by Each Nare route once daily. 16 g 1    hydrOXYzine pamoate (VISTARIL) 50 MG Cap TAKE 1 CAPSULE BY MOUTH EVERY EVENING. 90 capsule 1    lancets Misc 1 lancet by Misc.(Non-Drug; Combo Route) route once daily. 100 each 3    methocarbamol (ROBAXIN) 500 MG Tab Take 2 tablets (1,000 mg total) by mouth 4 (four) times daily as needed (Spasms). 30 tablet 0    mometasone (ELOCON) 0.1 % ointment APPLY TO AFFECTED AREA OF SKIN DAILY AS NEEDED FOR PSORIASIS      mometasone 0.1% (ELOCON) 0.1 % cream Apply topically once daily. 15 g 0    oxyCODONE (ROXICODONE) 5 MG immediate release tablet Take 1 tablet (5 mg total) by mouth every 6 (six) hours as needed for Pain. 11 tablet 0    oxyCODONE-acetaminophen (PERCOCET) 5-325 mg per tablet Take 1 tablet by mouth every 6 (six) hours as needed for Pain. 12 tablet 0       Past Surgical History:   Procedure Laterality Date     SECTION       SECTION      CHOLECYSTECTOMY      CORRECTION OF HAMMER TOE Left 2021    Procedure: LEFT 2-5th metatarsal head resections, hammertoe corrections;  Surgeon: Naima Mistry MD;  Location: [facility];  Service: Orthopedics;  Laterality: Left;    DILATION AND CURETTAGE OF UTERUS      EYE SURGERY      RESECTION OF GASTROCNEMIUS MUSCLE Left 2021    Procedure: LEFT gastroc recession;  Surgeon: Naima DOMINGUEZ  MD Fidelia;  Location: [facility];  Service: Orthopedics;  Laterality: Left;  Thigh tourniquet       Social History:  Tobacco Use: Medium Risk    Smoking Tobacco Use: Former    Smokeless Tobacco Use: Never    Passive Exposure: Not on file       Alcohol Use: Not on file       OBJECTIVE:     Vital Signs Range:  BMI Readings from Last 1 Encounters:   07/20/23 46.68 kg/m²       Temp:  [36.1 °C (97 °F)]   Pulse:  [82]   Resp:  [16]   BP: (158)/(94)   SpO2:  [98 %]        Significant Labs:        Component Value Date/Time    WBC 8.74 06/04/2022 1135    HGB 12.9 06/04/2022 1135    HCT 38.6 06/04/2022 1135    HCT 41 03/06/2022 1025     06/04/2022 1135     06/04/2022 1131    K 3.5 06/04/2022 1131     06/04/2022 1131    CO2 25 06/04/2022 1131    GLU 95 06/04/2022 1131    BUN 15 06/04/2022 1131    CREATININE 1.1 06/04/2022 1131    CALCIUM 8.8 06/04/2022 1131    ALBUMIN 3.4 (L) 06/04/2022 1131    PROT 6.8 06/04/2022 1131    ALKPHOS 79 06/04/2022 1131    BILITOT 0.4 06/04/2022 1131    AST 17 06/04/2022 1131    ALT 19 06/04/2022 1131    HGBA1C 5.2 06/04/2022 1132        Please see Results Review for additional labs.     Diagnostic Studies: No relevant studies.    EKG:   Results for orders placed or performed during the hospital encounter of 06/03/21   EKG 12-lead    Collection Time: 06/03/21  2:23 PM    Narrative    Test Reason : E55.9,    Vent. Rate : 092 BPM     Atrial Rate : 092 BPM     P-R Int : 206 ms          QRS Dur : 086 ms      QT Int : 362 ms       P-R-T Axes : 063 063 052 degrees     QTc Int : 447 ms    Sinus rhythm with occasional Premature ventricular complexes  Low voltage QRS  Nonspecific T wave abnormality  Abnormal ECG  When compared with ECG of 11-OCT-2019 11:03,  Premature ventricular complexes are now Present  Vent. rate has increased BY  43 BPM  Confirmed by Nydia Hollins MD ([id number]) on 6/3/2021 4:53:33 PM    Referred By: MYRNA OSORIO           Confirmed By:Nydia Hollins MD       ECHO:  See  subjective, if available.      ASSESSMENT/PLAN:         Pre-op Assessment    I have reviewed the Patient Summary Reports.       I have reviewed the Medications.     Review of Systems  Anesthesia Hx:  No problems with previous Anesthesia  History of prior surgery of interest to airway management or planning: Previous anesthesia: General   Social:  Former Smoker, Social Alcohol Use    Hematology/Oncology:  Hematology Normal   Oncology Normal     EENT/Dental:EENT/Dental Normal   Cardiovascular:   Exercise tolerance: poor Hypertension, well controlled Angina CHF    Pulmonary:  Pulmonary Normal    Renal/:   Chronic Renal Disease proteinuria   Hepatic/GI:  Hepatic/GI Normal    Musculoskeletal:   Arthritis     Neurological:   Neuromuscular Disease,    Endocrine:   Diabetes, well controlled, type 2  Morbid Obesity / BMI > 40  Psych:   Psychiatric History anxiety depression          Physical Exam  General: Well nourished, Cooperative, Alert and Oriented    Airway:  Mallampati: II   Mouth Opening: Normal  TM Distance: Normal  Tongue: Normal  Neck ROM: Normal ROM    Dental:  Intact        Anesthesia Plan  Type of Anesthesia, risks & benefits discussed:    Anesthesia Type: Gen Natural Airway  Intra-op Monitoring Plan: Standard ASA Monitors  Post Op Pain Control Plan: multimodal analgesia and IV/PO Opioids PRN  Induction:  IV  Airway Plan: , Post-Induction  Informed Consent: Informed consent signed with the Patient and all parties understand the risks and agree with anesthesia plan.  All questions answered.   ASA Score: 3    Ready For Surgery From Anesthesia Perspective.     .

## 2023-07-24 LAB
FINAL PATHOLOGIC DIAGNOSIS: NORMAL
GROSS: NORMAL
Lab: NORMAL

## 2023-10-13 NOTE — PROGRESS NOTES
Addended by: RENA BENAVIDEZ on: 10/13/2023 12:07 PM     Modules accepted: Level of Service     Subjective:       Patient ID: Kalee Dupree is a 46 y.o. female.    Chief Complaint: Follow-up    Seen for initial visit to [facility] care three months ago. Returns for f/u complaining of much stress lately, son went away to college (within the state), mother started dialysis a few months ago and Kalee has to drive her there. Feeling more anxious, requesting to have Celexa increased. No home glucose monitoring reported.     PMH: , misc x1.  Morbid Obesity, BMI>60. Her insurance declined to cover bariatric surgery. TSH repeatedly normal, 1.11 .   Hypertension since age 33 with Diastolic Dysfunction, EF 55% and no ischemia on Stress Echo .  Diabetes Type 2, HbA1c 6.9% May '17 - started Metformin.   Hypertriglyceridemia. TChol 182, , HDL 34, LDL ? .  Atypical Chest Pain, negative Coronary Angiogram  at [facility] per history.   Proteinuria, FSGS, last seen by Nephrology at [facility] years ago. BUN/CR 24/1.2, GFR 54.  FADIA on CPAP.   Iron Deficiency Anemia, EGD and Colonoscopy .   Seasonal Allergic Rhinitis.   Depression with Anxiety, panic attacks in the past.   Lumbar Spondylosis with moderate spinal stenosis on MRI 10/15.  Urge and Stress Urinary Incontinence.     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    Pelvic exam years ago. Mammogram normal . Colonoscopy normal , repeat 5 yrs due to family history. Eye exam . Flu shot . Td .    Social: , 19 yo son. Remote tobacco use, 1/2 PPD age [age]. Rare alcohol. 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     FMH: HTN in both parents and brother. DM in mother and brother. PAD in brother, legs were amputated before he  with complications of diabetes at age 47. Mother had Colon cancer in her early 50's, hyperlipidemia, CKD and thyroid disease.Lung cancer in father. Heart dis in PGF.     Allergies: Amlodipine, Azithromycin, Benazepril,  "Hydrocodone.    Medications: list reviewed and reconciled.           Review of Systems   Constitutional: Negative for diaphoresis and fever.   Respiratory: Negative for cough and shortness of breath.    Cardiovascular: Negative for chest pain.        Mild leg swelling.   Gastrointestinal: Positive for diarrhea. Negative for abdominal pain, nausea and vomiting.   Musculoskeletal: Positive for arthralgias and neck pain.   Neurological: Negative for dizziness, syncope and headaches.   Psychiatric/Behavioral: Positive for dysphoric mood. Negative for suicidal ideas. The patient is nervous/anxious.        Objective:    /78, repeat after sitting 136/84, Pulse 103, Ht 5' 9", Wt 456 lbs (from 451), BMI=67  Physical Exam   Constitutional: She is oriented to person, place, and time. No distress.   HENT:   Nose: Nose normal.   Mouth/Throat: Oropharynx is clear and moist.   Eyes: Conjunctivae are normal. No scleral icterus.   Neck: Normal range of motion. Neck supple. No JVD present.   Cardiovascular: Normal rate, regular rhythm and normal heart sounds.    Pulmonary/Chest: Effort normal and breath sounds normal. No respiratory distress. She has no wheezes. She has no rales.   Musculoskeletal: Normal range of motion.   No pitting edema in legs, mild pedal edema left foot.   Neurological: She is alert and oriented to person, place, and time. No cranial nerve deficit. Coordination normal.   Skin: Skin is warm and dry. She is not diaphoretic.   Psychiatric: She has a normal mood and affect. Her behavior is normal. Thought content normal.       Assessment:       1. Hypertensive heart disease with diastolic heart failure    2. Type 2 diabetes mellitus without complication, without long-term current use of insulin    3. Hypertriglyceridemia    4. Depression with anxiety    5. Morbid obesity with BMI of 60.0-69.9, adult    6. Influenza vaccine needed        Plan:       Hypertensive heart disease with diastolic heart failure  -    "  Basic metabolic panel; Future; Expected date: 09/18/2017  -     hydrochlorothiazide (HYDRODIURIL) 25 MG tablet; Take 1 tablet (25 mg total) by mouth once daily.  Dispense: 90 tablet; Refill: 1  -     Increase Metoprolol succinate (TOPROL-XL) from 50 to 100 MG 24 hr tablet; Take 1 tablet (100 mg total) by mouth once daily.  Dispense: 30 tablet; Refill: 3    Type 2 diabetes mellitus without complication, without long-term current use of insulin  -     Hemoglobin A1c; Future; Expected date: 09/18/2017    Hypertriglyceridemia - continue Simvastatin.     Depression with anxiety  -     citalopram (CELEXA) 40 MG tablet; Take 1 tablet (40 mg total) by mouth once daily.  Dispense: 30 tablet; Refill: 3    Morbid obesity with BMI of 60.0-69.9, adult       -     May consider resuming Adipex once blood pressure is consistently controlled, kalyani after raising Celexa dose today.     Influenza vaccine needed - Flu shot today.           no gross abnormalities

## 2023-12-18 ENCOUNTER — HOSPITAL ENCOUNTER (OUTPATIENT)
Dept: RADIOLOGY | Facility: HOSPITAL | Age: 53
Discharge: HOME | End: 2023-12-18
Attending: FAMILY MEDICINE
Payer: COMMERCIAL

## 2023-12-18 VITALS — WEIGHT: 293 LBS | BODY MASS INDEX: 45.61 KG/M2

## 2023-12-18 DIAGNOSIS — Z00.00 ANNUAL PHYSICAL EXAM: ICD-10-CM

## 2023-12-18 DIAGNOSIS — Z12.39 ENCOUNTER FOR SCREENING FOR MALIGNANT NEOPLASM OF BREAST, UNSPECIFIED SCREENING MODALITY: ICD-10-CM

## 2023-12-18 DIAGNOSIS — R51.9 NONINTRACTABLE HEADACHE, UNSPECIFIED CHRONICITY PATTERN, UNSPECIFIED HEADACHE TYPE: ICD-10-CM

## 2023-12-18 PROCEDURE — 77063 MAMMO DIGITAL SCREENING BILAT WITH TOMO: ICD-10-PCS | Mod: 26,,, | Performed by: RADIOLOGY

## 2023-12-18 PROCEDURE — 77067 SCR MAMMO BI INCL CAD: CPT | Mod: TC

## 2023-12-18 PROCEDURE — 77067 SCR MAMMO BI INCL CAD: CPT | Mod: 26,,, | Performed by: RADIOLOGY

## 2023-12-18 PROCEDURE — 77067 MAMMO DIGITAL SCREENING BILAT WITH TOMO: ICD-10-PCS | Mod: 26,,, | Performed by: RADIOLOGY

## 2023-12-18 PROCEDURE — 77063 BREAST TOMOSYNTHESIS BI: CPT | Mod: 26,,, | Performed by: RADIOLOGY

## 2024-01-27 ENCOUNTER — HOSPITAL ENCOUNTER (EMERGENCY)
Facility: HOSPITAL | Age: 54
Discharge: HOME | End: 2024-01-28
Attending: EMERGENCY MEDICINE
Payer: COMMERCIAL

## 2024-01-27 VITALS
SYSTOLIC BLOOD PRESSURE: 122 MMHG | WEIGHT: 293 LBS | HEART RATE: 60 BPM | TEMPERATURE: 98 F | DIASTOLIC BLOOD PRESSURE: 80 MMHG | OXYGEN SATURATION: 98 % | RESPIRATION RATE: 16 BRPM | BODY MASS INDEX: 45.61 KG/M2

## 2024-01-27 DIAGNOSIS — R11.0 NAUSEA: ICD-10-CM

## 2024-01-27 DIAGNOSIS — M79.606 LEG PAIN: ICD-10-CM

## 2024-01-27 LAB
ALBUMIN SERPL BCP-MCNC: 3.5 G/DL (ref 3.5–5.2)
ALP SERPL-CCNC: 91 U/L (ref 55–135)
ALT SERPL W/O P-5'-P-CCNC: 13 U/L (ref 10–44)
ANION GAP SERPL CALC-SCNC: 10 MMOL/L (ref 8–16)
AST SERPL-CCNC: 14 U/L (ref 10–40)
BASOPHILS # BLD AUTO: 0.08 K/UL (ref 0–0.2)
BASOPHILS NFR BLD: 0.7 % (ref 0–1.9)
BILIRUB SERPL-MCNC: 0.5 MG/DL (ref 0.1–1)
BUN SERPL-MCNC: 14 MG/DL (ref 6–20)
CALCIUM SERPL-MCNC: 8.8 MG/DL (ref 8.7–10.5)
CHLORIDE SERPL-SCNC: 104 MMOL/L (ref 95–110)
CK SERPL-CCNC: 100 U/L (ref 20–180)
CO2 SERPL-SCNC: 26 MMOL/L (ref 23–29)
CREAT SERPL-MCNC: 1 MG/DL (ref 0.5–1.4)
DIFFERENTIAL METHOD BLD: NORMAL
EOSINOPHIL # BLD AUTO: 0.2 K/UL (ref 0–0.5)
EOSINOPHIL NFR BLD: 1.6 % (ref 0–8)
ERYTHROCYTE [DISTWIDTH] IN BLOOD BY AUTOMATED COUNT: 13.7 % (ref 11.5–14.5)
EST. GFR  (NO RACE VARIABLE): >60 ML/MIN/1.73 M^2
GLUCOSE SERPL-MCNC: 91 MG/DL (ref 70–110)
HCT VFR BLD AUTO: 41.4 % (ref 37–48.5)
HGB BLD-MCNC: 13.7 G/DL (ref 12–16)
IMM GRANULOCYTES # BLD AUTO: 0.04 K/UL (ref 0–0.04)
IMM GRANULOCYTES NFR BLD AUTO: 0.3 % (ref 0–0.5)
LYMPHOCYTES # BLD AUTO: 4 K/UL (ref 1–4.8)
LYMPHOCYTES NFR BLD: 34.9 % (ref 18–48)
MCH RBC QN AUTO: 28.6 PG (ref 27–31)
MCHC RBC AUTO-ENTMCNC: 33.1 G/DL (ref 32–36)
MCV RBC AUTO: 86 FL (ref 82–98)
MONOCYTES # BLD AUTO: 0.8 K/UL (ref 0.3–1)
MONOCYTES NFR BLD: 7.2 % (ref 4–15)
NEUTROPHILS # BLD AUTO: 6.4 K/UL (ref 1.8–7.7)
NEUTROPHILS NFR BLD: 55.3 % (ref 38–73)
NRBC BLD-RTO: 0 /100 WBC
PLATELET # BLD AUTO: 241 K/UL (ref 150–450)
PMV BLD AUTO: 10.3 FL (ref 9.2–12.9)
POTASSIUM SERPL-SCNC: 3.4 MMOL/L (ref 3.5–5.1)
PROT SERPL-MCNC: 7.3 G/DL (ref 6–8.4)
RBC # BLD AUTO: 4.79 M/UL (ref 4–5.4)
SODIUM SERPL-SCNC: 140 MMOL/L (ref 136–145)
WBC # BLD AUTO: 11.57 K/UL (ref 3.9–12.7)

## 2024-01-27 PROCEDURE — 93005 ELECTROCARDIOGRAM TRACING: CPT

## 2024-01-27 PROCEDURE — 99285 EMERGENCY DEPT VISIT HI MDM: CPT | Mod: 25

## 2024-01-27 PROCEDURE — 85025 COMPLETE CBC W/AUTO DIFF WBC: CPT | Performed by: EMERGENCY MEDICINE

## 2024-01-27 PROCEDURE — 93010 ELECTROCARDIOGRAM REPORT: CPT | Mod: ,,, | Performed by: INTERNAL MEDICINE

## 2024-01-27 PROCEDURE — 80053 COMPREHEN METABOLIC PANEL: CPT | Performed by: EMERGENCY MEDICINE

## 2024-01-27 PROCEDURE — 25000003 PHARM REV CODE 250: Performed by: EMERGENCY MEDICINE

## 2024-01-27 PROCEDURE — 82550 ASSAY OF CK (CPK): CPT | Performed by: EMERGENCY MEDICINE

## 2024-01-27 RX ORDER — ACETAMINOPHEN 500 MG
1000 TABLET ORAL
Status: COMPLETED | OUTPATIENT
Start: 2024-01-27 | End: 2024-01-27

## 2024-01-27 RX ORDER — ONDANSETRON 4 MG/1
4 TABLET, ORALLY DISINTEGRATING ORAL
Status: COMPLETED | OUTPATIENT
Start: 2024-01-27 | End: 2024-01-27

## 2024-01-27 RX ADMIN — ACETAMINOPHEN 1000 MG: 500 TABLET ORAL at 09:01

## 2024-01-27 RX ADMIN — ONDANSETRON 4 MG: 4 TABLET, ORALLY DISINTEGRATING ORAL at 10:01

## 2024-01-28 RX ORDER — ACETAMINOPHEN 500 MG
500 TABLET ORAL EVERY 6 HOURS PRN
Qty: 20 TABLET | Refills: 0 | Status: SHIPPED | OUTPATIENT
Start: 2024-01-28

## 2024-01-28 NOTE — ED PROVIDER NOTES
"Encounter Date: 2024       History     Chief Complaint   Patient presents with    Leg Pain     Pt c/o 6/10 RLE tenderness in her calf and a "nodule" behind her thigh since this morning.     53-year-old female presenting with right calf pain.    PMH:  DM type 2, focal segmental glomerulosclerosis, hypertension, hyperlipidemia, lumbar disc disease, FADIA on CPAP, renal artery occlusion, anemia, anxiety, depression, s/p gastric bypass     The patient denies injury to her right leg.  She noticed an aching to her posterior calf.  She also feels a "knot" behind her knee.  She is concerned for a blood clot.  She denies recent long travel or major surgery.  Symptoms began this morning.  She denies chest pain or shortness of breath.  She feels slightly nauseated, but states this is not unusual after her bypass surgery.     The history is provided by the patient and medical records. No  was used.     Review of patient's allergies indicates:   Allergen Reactions    Amlodipine Nausea And Vomiting     Other reaction(s): Unknown    Amlodipine-benazepril Itching    Azithromycin      Stomach pain, and cramping    Benazepril Nausea And Vomiting     Other reaction(s): Unknown    Hydrocodone      Other reaction(s): Unknown    Hydrocodone-acetaminophen Hives and Itching     Past Medical History:   Diagnosis Date    Allergy     Anemia     Anxiety     Depression     Diabetes mellitus, type 2     Focal segmental glomerulosclerosis     Hyperlipidemia     Hypertension     Lumbar disc disease     Morbid obesity     FADIA on CPAP     Proteinuria     Retinal artery occlusion      Past Surgical History:   Procedure Laterality Date     SECTION       SECTION      CHOLECYSTECTOMY      COLONOSCOPY N/A 2023    Procedure: COLONOSCOPY;  Surgeon: Juan Morales MD;  Location: [facility] ([address] [place] [address]);  Service: Endoscopy;  Laterality: N/A;  suprep-instr portal-2nd floor-multiple [id number]  Pre call-no " answer-PASTORA Hicks Rn endoscopy    CORRECTION OF HAMMER TOE Left 6/9/2021    Procedure: LEFT 2-5th metatarsal head resections, hammertoe corrections;  Surgeon: Naima Mistry MD;  Location: [facility] OR;  Service: Orthopedics;  Laterality: Left;    DILATION AND CURETTAGE OF UTERUS      EYE SURGERY      RESECTION OF GASTROCNEMIUS MUSCLE Left 6/9/2021    Procedure: LEFT gastroc recession;  Surgeon: Naima Mistry MD;  Location: [facility] OR;  Service: Orthopedics;  Laterality: Left;  Thigh tourniquet     Family History   Problem Relation Age of Onset    Cancer Mother 56        colon    Diabetes Mother     Hyperlipidemia Mother     Hypertension Mother     Obesity Mother     Kidney disease Mother     Thyroid disease Mother     Colon cancer Mother     Cancer Father         lung    Hypertension Father     Diabetes Brother     Hypertension Brother     Heart disease Paternal Grandfather      Social History     Tobacco Use    Smoking status: Former     Current packs/day: 0.50     Average packs/day: 0.5 packs/day for 7.0 years (3.5 ttl pk-yrs)     Types: Cigarettes    Smokeless tobacco: Never    Tobacco comments:     Quit 1993.   Substance Use Topics    Alcohol use: Yes     Comment: Rare.     Drug use: No         Physical Exam     Initial Vitals [01/27/24 2002]   BP Pulse Resp Temp SpO2   (!) 178/98 73 16 98 °F (36.7 °C) 97 %      MAP       --         Physical Exam    Nursing note and vitals reviewed.  Constitutional: She is not diaphoretic. No distress.   HENT:   Head: Normocephalic and atraumatic.   Eyes: Right eye exhibits no discharge. Left eye exhibits no discharge.   Neck: Neck supple. No tracheal deviation present.   Cardiovascular:  Normal rate, regular rhythm and intact distal pulses.           Abdominal: Abdomen is soft. There is no abdominal tenderness.   Musculoskeletal:         General: No edema.      Cervical back: Neck supple.      Comments: Right calf tenderness.   Superficial nodule distal/medial thigh, possible palpable  cord   Right lower extremity compartments are soft  Full range of motion at knee and ankle, no external evidence of infection  Intact dorsalis pedis pulse  SILT      Neurological: She is alert and oriented to person, place, and time.   Skin: Skin is warm. No rash noted.   Psychiatric: She has a normal mood and affect. Her behavior is normal.         ED Course   Procedures  Labs Reviewed   COMPREHENSIVE METABOLIC PANEL - Abnormal; Notable for the following components:       Result Value    Potassium 3.4 (*)     All other components within normal limits   CBC W/ AUTO DIFFERENTIAL   CK     EKG Readings: (Independently Interpreted)   Initial Reading: No STEMI. Previous EKG: Compared with most recent EKG Previous EKG Date: 6/3/2021. Rhythm: Sinus Bradycardia. Heart Rate: 59. Conduction: 1st Degree AV Block. Clinical Impression: Sinus Bradycardia Other Impression: low voltage, similar to prior       Imaging Results              US Lower Extremity Veins Right (Final result)  Result time 01/27/24 23:28:19      Final result by Debbie Tracey MD (01/27/24 23:28:19)                   Impression:      No evidence of deep venous thrombosis in the right lower extremity.      Electronically signed by: Debbie Tracey MD  Date:    01/27/2024  Time:    23:28               Narrative:    EXAMINATION:  US LOWER EXTREMITY VEINS RIGHT    CLINICAL HISTORY:  Pain in leg, unspecified    TECHNIQUE:  Duplex and color flow Doppler evaluation and graded compression of the right lower extremity veins was performed.    COMPARISON:  04/07/2016    FINDINGS:  Right thigh veins: The common femoral, femoral, popliteal, upper greater saphenous, and deep femoral veins are patent and free of thrombus. The veins are normally compressible and have normal phasic flow and augmentation response.    Right calf veins: The visualized calf veins are patent.    Contralateral CFV: The contralateral (left) common femoral vein is patent and free of  thrombus.    Miscellaneous: None                                       Medications   acetaminophen tablet 1,000 mg (1,000 mg Oral Given 1/27/24 2146)   ondansetron disintegrating tablet 4 mg (4 mg Oral Given 1/27/24 2209)     Medical Decision Making  53-year-old female presenting with atraumatic right calf pain, concern for DVT.  On arrival, she is afebrile, well-appearing.  Right lower extremity is neurovascularly intact, no concern for acute ischemic limb.    Differential including, but not limited to:  Electrolyte abnormality, DVT, rhabdomyolysis, muscle strain    CPK normal. No fever, leukocytosis, or exam e/o infection. DVT study negative. Nodule could be superficial thrombophlebitis vs lipoma - can f/u PCP and we discussed compression stockings, OTC tylenol, warm compresses. F/u PCP this week, trend EKG, may need repeat US if sx persist.  Tolerating PO prior to d/c, non-tender abdomen, do not suspect cardiac etiology of nausea. Discussed food options for mild hypokalemia (bananas, fruit). Could be muscle strain.     Patient safe for discharge and outpatient follow up.  Advised scheduling appointment with PCP and return precautions given (fever, leg swelling, worsening pain, any new or concerning sx).  The patient understands and agrees with the plan.  All questions answered prior to discharge.         Amount and/or Complexity of Data Reviewed  Labs: ordered.    Risk  OTC drugs.  Prescription drug management.                                      Clinical Impression:  Final diagnoses:  [M79.606] Leg pain  [R11.0] Nausea          ED Disposition Condition    Discharge Stable          ED Prescriptions       Medication Sig Dispense Start Date End Date Auth. Provider    acetaminophen (TYLENOL) 500 MG tablet Take 1 tablet (500 mg total) by mouth every 6 (six) hours as needed for Pain. 20 tablet 1/28/2024 -- Sachin Rick MD          Follow-up Information       Follow up With Specialties Details Why Contact Info     Devonte Valdez MD Family Medicine In 3 days  [address] [facility] [address]  [phone]      [facility] - Emergency Dept Emergency Medicine  As needed [address]  [facility] [address] [phone]                    Sachin Rick MD  01/28/24 3901

## 2024-03-25 ENCOUNTER — PATIENT OUTREACH (OUTPATIENT)
Dept: ADMINISTRATIVE | Facility: HOSPITAL | Age: 54
End: 2024-03-25
Payer: COMMERCIAL

## 2024-09-29 NOTE — LETTER
March 31, 2022      [facility] - Orthopedics 5th Fl  [address], [address]  [facility] [address]  Phone: [phone]       Patient: Kalee Dupree   YOB: 1970  Date of Visit: 03/31/2022    To Whom It May Concern:    Laurie Dupree  was at [facility] on 03/31/2022. The patient may return to work on 4/6/2022 with restrictions.  Cannot climb stairs, may need to use cane/brace.  If you have any questions or concerns, or if I can be of further assistance, please do not hesitate to contact me.    Sincerely,    Yuan Pate MD     
16-Jan-2017 04:06
Attending to bill
no

## 2024-10-26 ENCOUNTER — LAB VISIT (OUTPATIENT)
Dept: LAB | Facility: HOSPITAL | Age: 54
End: 2024-10-26
Payer: COMMERCIAL

## 2024-10-26 DIAGNOSIS — R79.9 ABNORMAL BLOOD CHEMISTRY: ICD-10-CM

## 2024-10-26 LAB
ALBUMIN SERPL BCP-MCNC: 3.5 G/DL (ref 3.5–5.2)
ALP SERPL-CCNC: 112 U/L (ref 40–150)
ALT SERPL W/O P-5'-P-CCNC: 34 U/L (ref 10–44)
ANION GAP SERPL CALC-SCNC: 13 MMOL/L (ref 8–16)
AST SERPL-CCNC: 22 U/L (ref 10–40)
BASOPHILS # BLD AUTO: 0.09 K/UL (ref 0–0.2)
BASOPHILS NFR BLD: 0.9 % (ref 0–1.9)
BILIRUB SERPL-MCNC: 0.5 MG/DL (ref 0.1–1)
BUN SERPL-MCNC: 24 MG/DL (ref 6–20)
CALCIUM SERPL-MCNC: 8.8 MG/DL (ref 8.7–10.5)
CHLORIDE SERPL-SCNC: 104 MMOL/L (ref 95–110)
CO2 SERPL-SCNC: 27 MMOL/L (ref 23–29)
CREAT SERPL-MCNC: 1.1 MG/DL (ref 0.5–1.4)
DIFFERENTIAL METHOD BLD: NORMAL
EOSINOPHIL # BLD AUTO: 0.3 K/UL (ref 0–0.5)
EOSINOPHIL NFR BLD: 2.6 % (ref 0–8)
ERYTHROCYTE [DISTWIDTH] IN BLOOD BY AUTOMATED COUNT: 13.3 % (ref 11.5–14.5)
EST. GFR  (NO RACE VARIABLE): >60 ML/MIN/1.73 M^2
ESTIMATED AVG GLUCOSE: 108 MG/DL (ref 68–131)
GLUCOSE SERPL-MCNC: 110 MG/DL (ref 70–110)
HBA1C MFR BLD: 5.4 % (ref 4–5.6)
HCT VFR BLD AUTO: 40.3 % (ref 37–48.5)
HGB BLD-MCNC: 12.9 G/DL (ref 12–16)
IMM GRANULOCYTES # BLD AUTO: 0.03 K/UL (ref 0–0.04)
IMM GRANULOCYTES NFR BLD AUTO: 0.3 % (ref 0–0.5)
LYMPHOCYTES # BLD AUTO: 2.4 K/UL (ref 1–4.8)
LYMPHOCYTES NFR BLD: 24.1 % (ref 18–48)
MCH RBC QN AUTO: 28.5 PG (ref 27–31)
MCHC RBC AUTO-ENTMCNC: 32 G/DL (ref 32–36)
MCV RBC AUTO: 89 FL (ref 82–98)
MONOCYTES # BLD AUTO: 0.7 K/UL (ref 0.3–1)
MONOCYTES NFR BLD: 7.3 % (ref 4–15)
NEUTROPHILS # BLD AUTO: 6.5 K/UL (ref 1.8–7.7)
NEUTROPHILS NFR BLD: 64.8 % (ref 38–73)
NRBC BLD-RTO: 0 /100 WBC
PLATELET # BLD AUTO: 222 K/UL (ref 150–450)
PMV BLD AUTO: 11.1 FL (ref 9.2–12.9)
POTASSIUM SERPL-SCNC: 3.3 MMOL/L (ref 3.5–5.1)
PROT SERPL-MCNC: 6.9 G/DL (ref 6–8.4)
RBC # BLD AUTO: 4.52 M/UL (ref 4–5.4)
SODIUM SERPL-SCNC: 144 MMOL/L (ref 136–145)
WBC # BLD AUTO: 10.07 K/UL (ref 3.9–12.7)

## 2024-10-26 PROCEDURE — 36415 COLL VENOUS BLD VENIPUNCTURE: CPT | Performed by: FAMILY MEDICINE

## 2024-10-26 PROCEDURE — 83036 HEMOGLOBIN GLYCOSYLATED A1C: CPT | Performed by: FAMILY MEDICINE

## 2024-10-26 PROCEDURE — 85025 COMPLETE CBC W/AUTO DIFF WBC: CPT | Performed by: FAMILY MEDICINE

## 2024-10-26 PROCEDURE — 80053 COMPREHEN METABOLIC PANEL: CPT | Performed by: FAMILY MEDICINE

## 2025-02-11 DIAGNOSIS — M48.062 SPINAL STENOSIS, LUMBAR REGION WITH NEUROGENIC CLAUDICATION: Primary | ICD-10-CM

## 2025-02-21 ENCOUNTER — TELEPHONE (OUTPATIENT)
Dept: NEUROSURGERY | Facility: CLINIC | Age: 55
End: 2025-02-21
Payer: COMMERCIAL

## 2025-02-21 NOTE — TELEPHONE ENCOUNTER
----- Message from Nora sent at 2/21/2025 12:27 PM CST -----  Regarding: Scheduling Request  Name Of Caller:   Gavino Preference:   [phone] Nature of call:   Pt would like to schedule Np appt with Dr. Bess. She has a referral in [employer].

## 2025-02-24 DIAGNOSIS — M54.50 LOW BACK PAIN, UNSPECIFIED BACK PAIN LATERALITY, UNSPECIFIED CHRONICITY, UNSPECIFIED WHETHER SCIATICA PRESENT: Primary | ICD-10-CM

## 2025-02-25 ENCOUNTER — TELEPHONE (OUTPATIENT)
Dept: NEUROSURGERY | Facility: CLINIC | Age: 55
End: 2025-02-25
Payer: COMMERCIAL

## 2025-02-25 NOTE — TELEPHONE ENCOUNTER
Called and spoke to pt confirming appts on:    Future Appointments   Date Time Provider Department Center   3/14/2025  4:00 PM [facility]   3/17/2025  3:00 PM Anastacia Euceda, NP [facility] NEUROS8 [facility]       ----- Message from Med Assistant Allred sent at 2/21/2025  1:26 PM CST -----  Regarding: FW: Scheduling Request    ----- Message -----  From: Nora Srivastava  Sent: 2/21/2025  12:29 PM CST  To: [facility] Neurosurgery 8th Floor Clinical Support#  Subject: Scheduling Request                               Name Of Caller:   Gavino Preference:   [phone] Nature of call:   Pt would like to schedule Np appt with Dr. Bess. She has a referral in [employer].

## 2025-03-24 ENCOUNTER — HOSPITAL ENCOUNTER (OUTPATIENT)
Dept: RADIOLOGY | Facility: HOSPITAL | Age: 55
Discharge: HOME | End: 2025-03-24
Payer: COMMERCIAL

## 2025-03-24 DIAGNOSIS — M54.50 LOW BACK PAIN, UNSPECIFIED BACK PAIN LATERALITY, UNSPECIFIED CHRONICITY, UNSPECIFIED WHETHER SCIATICA PRESENT: ICD-10-CM

## 2025-03-24 PROCEDURE — 72110 X-RAY EXAM L-2 SPINE 4/>VWS: CPT | Mod: TC

## 2025-03-24 PROCEDURE — 72110 X-RAY EXAM L-2 SPINE 4/>VWS: CPT | Mod: 26,,, | Performed by: RADIOLOGY

## 2025-04-16 ENCOUNTER — TELEPHONE (OUTPATIENT)
Dept: NEUROSURGERY | Facility: CLINIC | Age: 55
End: 2025-04-16
Payer: COMMERCIAL

## 2025-04-16 NOTE — TELEPHONE ENCOUNTER
Called and spoke to pt confirming r/s her appt to:    Future Appointments   Date Time Provider Department Center   4/23/2025 11:30 AM Cortes Bess MD [facility]

## 2025-04-16 NOTE — TELEPHONE ENCOUNTER
"Called and spoke to pt to offer her first avail appt w/ Dr. Bess in July. Pt accepted but is requesting that she be scheduled for first avail for Anastacia. Informed her that she has img and can see Anastacia if she would prefer but asked for appts w/ both Anastacia and Dr. Bess for:    Future Appointments   Date Time Provider Department Center   4/28/2025  3:00 PM Anastacia Euceda NP [place] [address] [address]   7/15/2025 10:45 AM Cortes Bess MD [address]       ----- Message from Anastacia Euceda NP sent at 4/16/2025  1:52 PM CDT -----  Regarding: RE: pt adivce  Contact: [phone]  I didn't "see" her. She wanted to see Dr. Bess and had all the scans so left so that she wouldn't be charged a co-pay.  ----- Message -----  From: Bri Vela  Sent: 4/16/2025   1:45 PM CDT  To: Anastacia Euceda NP  Subject: FW: pt adivce                                    Hi Anastacia, Could you close this patient's note from 04/07 when you get the chance? Thanks!  ----- Message -----  From: Allyson Rosario  Sent: 4/16/2025  12:01 PM CDT  To: [facility] Staff  Subject: pt adivce                                        .Name Of Caller: Self Contact Preference?:[phone] What is the nature of the call?: needing to speak to someone from the office in reference to the status of pt MRI and paperwork that was drop off on the 4/9/25 at the . Pl call  Additional Notes:"Thank you for all that you do for our patients"  "

## 2025-04-21 ENCOUNTER — HOSPITAL ENCOUNTER (OUTPATIENT)
Dept: RADIOLOGY | Facility: HOSPITAL | Age: 55
Discharge: HOME | End: 2025-04-21
Attending: PODIATRIST
Payer: COMMERCIAL

## 2025-04-21 ENCOUNTER — OFFICE VISIT (OUTPATIENT)
Dept: PODIATRY | Facility: CLINIC | Age: 55
End: 2025-04-21
Payer: COMMERCIAL

## 2025-04-21 VITALS — WEIGHT: 293 LBS | BODY MASS INDEX: 44.41 KG/M2 | HEIGHT: 68 IN

## 2025-04-21 DIAGNOSIS — T14.8XXA ABRASION: ICD-10-CM

## 2025-04-21 DIAGNOSIS — E11.9 TYPE 2 DIABETES MELLITUS WITHOUT COMPLICATION, WITHOUT LONG-TERM CURRENT USE OF INSULIN: Primary | ICD-10-CM

## 2025-04-21 DIAGNOSIS — B35.1 ONYCHOMYCOSIS DUE TO DERMATOPHYTE: ICD-10-CM

## 2025-04-21 PROCEDURE — 1159F MED LIST DOCD IN RCRD: CPT | Mod: CPTII,S$GLB,, | Performed by: PODIATRIST

## 2025-04-21 PROCEDURE — 3008F BODY MASS INDEX DOCD: CPT | Mod: CPTII,S$GLB,, | Performed by: PODIATRIST

## 2025-04-21 PROCEDURE — 99999 PR PBB SHADOW E&M-EST. PATIENT-LVL III: CPT | Mod: PBBFAC,,, | Performed by: PODIATRIST

## 2025-04-21 PROCEDURE — 99213 OFFICE O/P EST LOW 20 MIN: CPT | Mod: S$GLB,,, | Performed by: PODIATRIST

## 2025-04-21 PROCEDURE — 73630 X-RAY EXAM OF FOOT: CPT | Mod: 26,LT,, | Performed by: STUDENT IN AN ORGANIZED HEALTH CARE EDUCATION/TRAINING PROGRAM

## 2025-04-21 PROCEDURE — 73630 X-RAY EXAM OF FOOT: CPT | Mod: TC,FY,PO,LT

## 2025-04-21 NOTE — PROGRESS NOTES
"Subjective:     Patient ID: Kalee Dupree is a 54 y.o. female.    Chief Complaint: Diabetes Mellitus (01/15/24 Dr Valdez )    Kalee is a 54 y.o. female who presents to the clinic for evaluation and treatment of high risk feet. Kalee has a past medical history of Allergy, Anemia, Anxiety, Depression, Diabetes mellitus, type 2, Focal segmental glomerulosclerosis, Hyperlipidemia, Hypertension, Lumbar disc disease, Morbid obesity, FADIA on CPAP, Proteinuria, and Retinal artery occlusion. The patient's chief complaint is foot ulcer right foot reports "splitting of skin"- acute issue.     PCP: Devonte Valdez MD    Date Last Seen by PCP: per above        Hemoglobin A1C   Date Value Ref Range Status   10/26/2024 5.4 4.0 - 5.6 % Final     Comment:     ADA Screening Guidelines:  5.7-6.4%  Consistent with prediabetes  >or=6.5%  Consistent with diabetes    High levels of fetal hemoglobin interfere with the HbA1C  assay. Heterozygous hemoglobin variants (HbS, HgC, etc)do  not significantly interfere with this assay.   However, presence of multiple variants may affect accuracy.     06/04/2022 5.2 4.0 - 5.6 % Final     Comment:     ADA Screening Guidelines:  5.7-6.4%  Consistent with prediabetes  >or=6.5%  Consistent with diabetes    High levels of fetal hemoglobin interfere with the HbA1C  assay. Heterozygous hemoglobin variants (HbS, HgC, etc)do  not significantly interfere with this assay.   However, presence of multiple variants may affect accuracy.     11/03/2020 5.1 4.0 - 5.6 % Final     Comment:     ADA Screening Guidelines:  5.7-6.4%  Consistent with prediabetes  >or=6.5%  Consistent with diabetes  High levels of fetal hemoglobin interfere with the HbA1C  assay. Heterozygous hemoglobin variants (HbS, HgC, etc)do  not significantly interfere with this assay.   However, presence of multiple variants may affect accuracy.         Review of Systems   Constitutional: Negative for chills.   Cardiovascular:  Negative for chest " pain and claudication.   Respiratory:  Negative for cough.    Skin:  Positive for color change, dry skin and nail changes.   Musculoskeletal:  Positive for joint pain.   Gastrointestinal:  Negative for nausea.   Neurological:  Positive for paresthesias. Negative for numbness.   Psychiatric/Behavioral:  The patient is not nervous/anxious.         Objective:     Physical Exam  Constitutional:       Appearance: She is well-developed.      Comments: Oriented to time, place, and person.   Cardiovascular:      Comments: DP and PT pulses are palpable bilaterally. 3 sec capillary refill time and toes and feet are warm to touch proximally .  There is  hair growth on the feet and toes b/l. There is no edema b/l. No spider veins or varicosities present b/l.     Musculoskeletal:      Comments: Equinus noted b/l ankles with < 10 deg DF noted. MMT 5/5 in DF/PF/Inv/Ev resistance with no reproduction of pain in any direction. Passive range of motion of ankle and pedal joints is painless b/l.     Feet:      Right foot:      Skin integrity: No callus or dry skin.      Left foot:      Skin integrity: No callus or dry skin.   Lymphadenopathy:      Comments: Negative lymphadenopathy bilateral popliteal fossa and tarsal tunnel.   Skin:     Comments: Fissure left foot superficial abrasion     Neurological:      Mental Status: She is alert.      Comments: Light touch, proprioception, and sharp/dull sensation are all intact bilaterally. Protective threshold with the [place] monofilament is intact bilaterally.    Psychiatric:         Behavior: Behavior is cooperative.           Assessment:      Encounter Diagnoses   Name Primary?    Abrasion     Type 2 diabetes mellitus without complication, without long-term current use of insulin Yes    Onychomycosis due to dermatophyte      Plan:     Kalee was seen today for diabetes mellitus.    Diagnoses and all orders for this visit:    Type 2 diabetes mellitus without complication, without  long-term current use of insulin    Abrasion  -     X-Ray Foot Complete Right; Future  -     X-Ray Foot Complete Left; Future    Onychomycosis due to dermatophyte      I counseled the patient on her conditions, their implications and medical management.        Xray left foot ordered    Instructed on daily betadine application left foot.     CAM boot dispensed and applied to affected foot. Advised to use at all times while weightbearing and ambulating even for few minutes. Advised to use sneaker style shoe in opposite foot to maintain balance. Ok to remove  at night but reapply if patient is to get up in the middle of the night. Verbalized understanding. Advised to use for 3 -4 weeks.     - Shoe inspection. Diabetic Foot Education. Patient reminded of the importance of good nutrition and blood sugar control to help prevent podiatric complications of diabetes. Patient instructed on proper foot hygeine. We discussed wearing proper shoe gear, daily foot inspections, never walking without protective shoe gear, caution putting sharp instruments to feet     - Discussed DM foot care:  Wear comfortable, proper fitting shoes. Wash feet daily. Dry well. After drying, apply moisturizer to feet (no lotion to webspaces). Inspect feet daily for skin breaks, blisters, swelling, or redness. Wear cotton socks (preferably white)  Change socks every day. Do NOT walk barefoot. Do NOT use heating pads or warm/hot water soaks     Nails 1-5 trimmed    F/u 2 weeks.

## 2025-04-23 ENCOUNTER — OFFICE VISIT (OUTPATIENT)
Dept: NEUROSURGERY | Facility: CLINIC | Age: 55
End: 2025-04-23
Payer: COMMERCIAL

## 2025-04-23 DIAGNOSIS — M47.812 CERVICAL SPONDYLOSIS: Primary | ICD-10-CM

## 2025-04-23 PROCEDURE — 99999 PR PBB SHADOW E&M-EST. PATIENT-LVL I: CPT | Mod: PBBFAC,,, | Performed by: NEUROLOGICAL SURGERY

## 2025-04-23 PROCEDURE — 99204 OFFICE O/P NEW MOD 45 MIN: CPT | Mod: S$GLB,,, | Performed by: NEUROLOGICAL SURGERY

## 2025-04-23 PROCEDURE — 1159F MED LIST DOCD IN RCRD: CPT | Mod: CPTII,S$GLB,, | Performed by: NEUROLOGICAL SURGERY

## 2025-05-01 ENCOUNTER — PATIENT MESSAGE (OUTPATIENT)
Dept: NEUROSURGERY | Facility: CLINIC | Age: 55
End: 2025-05-01
Payer: COMMERCIAL
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
## 2025-05-05 ENCOUNTER — OFFICE VISIT (OUTPATIENT)
Dept: PODIATRY | Facility: CLINIC | Age: 55
End: 2025-05-05
Payer: COMMERCIAL

## 2025-05-05 VITALS — HEIGHT: 68 IN | BODY MASS INDEX: 44.41 KG/M2 | WEIGHT: 293 LBS

## 2025-05-05 DIAGNOSIS — T14.8XXA ABRASION: ICD-10-CM

## 2025-05-05 DIAGNOSIS — E11.9 TYPE 2 DIABETES MELLITUS WITHOUT COMPLICATION, WITHOUT LONG-TERM CURRENT USE OF INSULIN: Primary | ICD-10-CM

## 2025-05-05 PROCEDURE — 1159F MED LIST DOCD IN RCRD: CPT | Mod: CPTII,S$GLB,, | Performed by: PODIATRIST

## 2025-05-05 PROCEDURE — 99212 OFFICE O/P EST SF 10 MIN: CPT | Mod: S$GLB,,, | Performed by: PODIATRIST

## 2025-05-05 PROCEDURE — 99999 PR PBB SHADOW E&M-EST. PATIENT-LVL III: CPT | Mod: PBBFAC,,, | Performed by: PODIATRIST

## 2025-05-05 PROCEDURE — 3008F BODY MASS INDEX DOCD: CPT | Mod: CPTII,S$GLB,, | Performed by: PODIATRIST

## 2025-05-06 NOTE — PROGRESS NOTES
"Subjective:     Patient ID: Kalee Dupree is a 54 y.o. female.    Chief Complaint: Diabetes Mellitus (1/15/24 Dr Valdez) and Wound Check    Kalee is a 54 y.o. female who presents to the clinic for evaluation and treatment of high risk feet. Kalee has a past medical history of Allergy, Anemia, Anxiety, Depression, Diabetes mellitus, type 2, Focal segmental glomerulosclerosis, Hyperlipidemia, Hypertension, Lumbar disc disease, Morbid obesity, FADIA on CPAP, Proteinuria, and Retinal artery occlusion. The patient's chief complaint is foot ulcer right foot reports "splitting of skin"- acute issue.     5/5/25: Reports applying betadine daily left foot.     PCP: Devonte Valdez MD    Date Last Seen by PCP: per above        Hemoglobin A1C   Date Value Ref Range Status   10/26/2024 5.4 4.0 - 5.6 % Final     Comment:     ADA Screening Guidelines:  5.7-6.4%  Consistent with prediabetes  >or=6.5%  Consistent with diabetes    High levels of fetal hemoglobin interfere with the HbA1C  assay. Heterozygous hemoglobin variants (HbS, HgC, etc)do  not significantly interfere with this assay.   However, presence of multiple variants may affect accuracy.     06/04/2022 5.2 4.0 - 5.6 % Final     Comment:     ADA Screening Guidelines:  5.7-6.4%  Consistent with prediabetes  >or=6.5%  Consistent with diabetes    High levels of fetal hemoglobin interfere with the HbA1C  assay. Heterozygous hemoglobin variants (HbS, HgC, etc)do  not significantly interfere with this assay.   However, presence of multiple variants may affect accuracy.     11/03/2020 5.1 4.0 - 5.6 % Final     Comment:     ADA Screening Guidelines:  5.7-6.4%  Consistent with prediabetes  >or=6.5%  Consistent with diabetes  High levels of fetal hemoglobin interfere with the HbA1C  assay. Heterozygous hemoglobin variants (HbS, HgC, etc)do  not significantly interfere with this assay.   However, presence of multiple variants may affect accuracy.         Review of Systems "   Constitutional: Negative for chills.   Cardiovascular:  Negative for chest pain and claudication.   Respiratory:  Negative for cough.    Skin:  Positive for color change, dry skin and nail changes.   Musculoskeletal:  Positive for joint pain.   Gastrointestinal:  Negative for nausea.   Neurological:  Positive for paresthesias. Negative for numbness.   Psychiatric/Behavioral:  The patient is not nervous/anxious.         Objective:     Physical Exam  Constitutional:       Appearance: She is well-developed.      Comments: Oriented to time, place, and person.   Cardiovascular:      Comments: DP and PT pulses are palpable bilaterally. 3 sec capillary refill time and toes and feet are warm to touch proximally .  There is  hair growth on the feet and toes b/l. There is no edema b/l. No spider veins or varicosities present b/l.     Musculoskeletal:      Comments: Equinus noted b/l ankles with < 10 deg DF noted. MMT 5/5 in DF/PF/Inv/Ev resistance with no reproduction of pain in any direction. Passive range of motion of ankle and pedal joints is painless b/l.     Feet:      Right foot:      Skin integrity: No callus or dry skin.      Left foot:      Skin integrity: No callus or dry skin.   Lymphadenopathy:      Comments: Negative lymphadenopathy bilateral popliteal fossa and tarsal tunnel.   Skin:     Comments: Fissure left foot superficial abrasion     Neurological:      Mental Status: She is alert.      Comments: Light touch, proprioception, and sharp/dull sensation are all intact bilaterally. Protective threshold with the Franklin-Wienstein monofilament is intact bilaterally.    Psychiatric:         Behavior: Behavior is cooperative.           Assessment:      No diagnosis found.    Plan:     There are no diagnoses linked to this encounter.    I counseled the patient on her conditions, their implications and medical management.        Xray left foot ordered- reviewed     Instructed on daily betadine application left foot.      CAM boot dispensed and applied to affected foot. Advised to use at all times while weightbearing and ambulating even for few minutes. Advised to use sneaker style shoe in opposite foot to maintain balance. Ok to remove  at night but reapply if patient is to get up in the middle of the night. Verbalized understanding. Advised to use for 3 -4 weeks.     - Shoe inspection. Diabetic Foot Education. Patient reminded of the importance of good nutrition and blood sugar control to help prevent podiatric complications of diabetes. Patient instructed on proper foot hygeine. We discussed wearing proper shoe gear, daily foot inspections, never walking without protective shoe gear, caution putting sharp instruments to feet     - Discussed DM foot care:  Wear comfortable, proper fitting shoes. Wash feet daily. Dry well. After drying, apply moisturizer to feet (no lotion to webspaces). Inspect feet daily for skin breaks, blisters, swelling, or redness. Wear cotton socks (preferably white)  Change socks every day. Do NOT walk barefoot. Do NOT use heating pads or warm/hot water soaks         F/u 2 weeks.

## 2025-05-20 ENCOUNTER — HOSPITAL ENCOUNTER (OUTPATIENT)
Dept: RADIOLOGY | Facility: HOSPITAL | Age: 55
Discharge: HOME | End: 2025-05-20
Attending: NEUROLOGICAL SURGERY
Payer: COMMERCIAL

## 2025-05-20 ENCOUNTER — OFFICE VISIT (OUTPATIENT)
Dept: NEUROSURGERY | Facility: CLINIC | Age: 55
End: 2025-05-20
Payer: COMMERCIAL

## 2025-05-20 DIAGNOSIS — R27.0 ATAXIA: Primary | ICD-10-CM

## 2025-05-20 DIAGNOSIS — M47.812 CERVICAL SPONDYLOSIS: ICD-10-CM

## 2025-05-20 PROCEDURE — 72141 MRI NECK SPINE W/O DYE: CPT | Mod: TC

## 2025-05-20 PROCEDURE — 99999 PR PBB SHADOW E&M-EST. PATIENT-LVL IV: CPT | Mod: PBBFAC,,, | Performed by: PHYSICIAN ASSISTANT

## 2025-05-20 PROCEDURE — 99213 OFFICE O/P EST LOW 20 MIN: CPT | Mod: S$GLB,,, | Performed by: PHYSICIAN ASSISTANT

## 2025-05-20 PROCEDURE — 1159F MED LIST DOCD IN RCRD: CPT | Mod: CPTII,S$GLB,, | Performed by: PHYSICIAN ASSISTANT

## 2025-05-20 PROCEDURE — 1160F RVW MEDS BY RX/DR IN RCRD: CPT | Mod: CPTII,S$GLB,, | Performed by: PHYSICIAN ASSISTANT

## 2025-05-20 PROCEDURE — 72141 MRI NECK SPINE W/O DYE: CPT | Mod: 26,,, | Performed by: RADIOLOGY

## 2025-05-20 RX ORDER — PREGABALIN 300 MG/1
300 CAPSULE ORAL
COMMUNITY
Start: 2025-04-29

## 2025-05-20 RX ORDER — METOPROLOL SUCCINATE 50 MG/1
75 TABLET, EXTENDED RELEASE ORAL
COMMUNITY
Start: 2025-05-10

## 2025-05-20 RX ORDER — CLONAZEPAM 1 MG/1
1 TABLET ORAL DAILY PRN
COMMUNITY
Start: 2025-04-23

## 2025-05-20 NOTE — PROGRESS NOTES
Neurosurgery  Established Patient    SUBJECTIVE:     History of Present Illness:  53 yo female with PMHx of T2DM, HTN, diastolic HF, FADIA, h/o gastric bypass and chronic back pain with know disc disease with canal stenosis at L4-5, who presented more recently with balanced difficulty and symptoms concerning for myelopathy. Here today for follow up with MRI of the cervical spine.  Patient reports ongoing balance difficulty.  She denies syncope or presyncope.  She denies vertigo.  She states that her legs continue to feel clumsy.  She does have some mild difficulty with hand weakness.  Denies pain paresthesias or weakness in the upper extremities.  She has some bilateral foot numbness and tingling, otherwise no shooting pain down the legs.  No urinary incontinence or bladder incontinence.      Review of patient's allergies indicates:   Allergen Reactions    Amlodipine Nausea And Vomiting     Other reaction(s): Unknown    Amlodipine-benazepril Itching    Azithromycin      Stomach pain, and cramping    Benazepril Nausea And Vomiting     Other reaction(s): Unknown    Hydrocodone      Other reaction(s): Unknown    Hydrocodone-acetaminophen Hives, Itching and Rash       Current Medications[1]    Past Medical History:   Diagnosis Date    Allergy     Anemia     Anxiety     Depression     Diabetes mellitus, type 2     Focal segmental glomerulosclerosis     Hyperlipidemia     Hypertension     Lumbar disc disease     Morbid obesity     FADIA on CPAP     Proteinuria     Retinal artery occlusion      Past Surgical History:   Procedure Laterality Date     SECTION       SECTION      CHOLECYSTECTOMY      COLONOSCOPY N/A 2023    Procedure: COLONOSCOPY;  Surgeon: Juan Morales MD;  Location: [facility] ([address] [place] [address]);  Service: Endoscopy;  Laterality: N/A;  suprep-instr portal-2nd floor-multiple [id number]  Pre call-no answer-PASTORA Hicks Rn endoscopy    CORRECTION OF HAMMER TOE Left 2021    Procedure: LEFT  2-5th metatarsal head resections, hammertoe corrections;  Surgeon: Naima Mistry MD;  Location: [facility] OR;  Service: Orthopedics;  Laterality: Left;    DILATION AND CURETTAGE OF UTERUS      EYE SURGERY      RESECTION OF GASTROCNEMIUS MUSCLE Left 6/9/2021    Procedure: LEFT gastroc recession;  Surgeon: Naima Mistry MD;  Location: [facility] OR;  Service: Orthopedics;  Laterality: Left;  Thigh tourniquet     Family History       Problem Relation (Age of Onset)    Cancer Mother (56), Father    Colon cancer Mother    Diabetes Mother, Brother    Heart disease Paternal Grandfather    Hyperlipidemia Mother    Hypertension Mother, Father, Brother    Kidney disease Mother    Obesity Mother    Thyroid disease Mother          Social History     Socioeconomic History    Marital status:     Number of children: 1   Occupational History     Employer: [employer]   Tobacco Use    Smoking status: Former     Current packs/day: 0.50     Average packs/day: 0.5 packs/day for 7.0 years (3.5 ttl pk-yrs)     Types: Cigarettes    Smokeless tobacco: Never    Tobacco comments:     Quit 1993.   Substance and Sexual Activity    Alcohol use: Yes     Comment: Rare.     Drug use: No    Sexual activity: Yes     Partners: Male   Social History Narrative    ** Merged History Encounter **         Several family members on mother side with aneurysms heart and aorta     Social Drivers of Health     Financial Resource Strain: Medium Risk (7/5/2024)    Received from [facility]    Overall Financial Resource Strain (CARDIA)     Difficulty of Paying Living Expenses: Somewhat hard   Food Insecurity: No Food Insecurity (7/5/2024)    Received from [facility]    Hunger Vital Sign     Worried About Running Out of Food in the Last Year: Never true     Ran Out of Food in the Last Year: Never true   Transportation Needs: No Transportation Needs (7/5/2024)    Received from [facility]    PRAPARE - Transportation     Lack of Transportation (Medical):  No     Lack of Transportation (Non-Medical): No   Physical Activity: Insufficiently Active (7/5/2024)    Received from [facility]    Exercise Vital Sign     Days of Exercise per Week: 2 days     Minutes of Exercise per Session: 30 min   Stress: No Stress Concern Present (7/5/2024)    Received from [facility]    [employer] [place] [employer] - Occupational Stress Questionnaire     Feeling of Stress : Only a little   Housing Stability: Low Risk  (7/5/2024)    Received from [facility]    Housing Stability Vital Sign     Unable to Pay for Housing in the Last Year: No     Number of Places Lived in the Last Year: 1     Unstable Housing in the Last Year: No       OBJECTIVE:     Neurosurgery Physical Exam     General: Awake, Alert, Oriented x 3, NAD  Cranial nerves: II- XII are grossly intact  Sensory: response to light touch throughout  Motor Strength: HG 4/5 B/L otherwise full strength upper and lower extremities  DTRs are 3+.  Mild jameson's on left. Negative ankle clonus  Ambulates mild spasticity/unsteadiness      Diagnostic Results: personally reviewed  EXAMINATION:  MRI CERVICAL SPINE WITHOUT CONTRAST     CLINICAL HISTORY:  Myelopathy, chronic, cervical spine;.  Spondylosis without myelopathy or radiculopathy, cervical region     TECHNIQUE:  Multiplanar, multisequence MR images of the cervical spine were acquired without the administration of contrast.     COMPARISON:  None.     FINDINGS:  2-3 mm of anterolisthesis of C3 on C4 and C4 on C5 occipital condyles, C1 lateral masses and odontoid process are intact.  Vertebral body heights are well maintained.  No acute fractures.  Focal area of signal abnormality at the posterior aspect of the C3 vertebral body, favored to represent an atypical hemangioma.  No marrow signal abnormality to suggest an infiltrative process.     Mild C4-C5 and moderate to severe C5-C6 degenerative disc height loss.  Chronic degenerative fatty endplate change at C5-C6.  No  endplate edema.     Spinal cord demonstrates normal contour and signal intensity.  Visualized brainstem is normal.  Cerebellar tonsils are in their expected location.     Vertebral artery flow voids are present.  Visualized parotid glands appear within normal limits.  No cervical lymphadenopathy.  Paraspinal musculature demonstrates normal bulk and signal intensity.  Prevertebral soft tissues are normal.     C2-C3: No spinal canal stenosis or neural foraminal narrowing.     C3-C4: Posterior disc osteophyte complex partially effaces the ventral thecal sac.  No spinal canal stenosis or neural foraminal narrowing.     C4-C5: Posterior disc osteophyte complex partially effaces the ventral thecal sac.  No spinal canal stenosis or neural foraminal narrowing.     C5-C6: Posterior disc osteophyte complex partially effaces the ventral thecal sac.  Bilateral uncovertebral joint spurring.  No spinal canal stenosis.  Mild left neural foraminal narrowing.     C6-C7: Posterior disc osteophyte complex partially effaces the ventral thecal sac.  No spinal canal stenosis or neural foraminal narrowing.     C7-T1: Posterior disc osteophyte complex partially effaces the ventral thecal sac.  No spinal canal stenosis or neural foraminal narrowing.     Impression:     1. Cervical spondylosis as detailed above.  Mild left neural foraminal narrowing at C5-C6.  No spinal canal stenosis.        Electronically signed by:Forrest Ramires MD  Date:                                            05/21/2025  Time:                                           07:01    ASSESSMENT/PLAN:     53 yo female with PMHx of T2DM, HTN, diastolic HF, FADIA, h/o gastric bypass and chronic back pain with know disc disease with canal stenosis at L4-5, now with s/s concerning for myelopathy. MRI scan of the cervical spine does not show any significant degree of spinal canal stenosis or cord compression to explain patient's symptoms.  Patient has had head CT was which does  not show evidence of ventriculomegaly.  She had a recent MRI of her lumbar spine which does show severe canal stenosis at L4-5, however she does not have symptoms of neurogenic claudication or radiculopathy at this time.  She does not have focal deficits in her lower extremities.  Given ongoing balance difficulty, in setting of patient with history of gastric bypass will check a B12 level. She does report being on supplementation. In addition will get thoracic MRI to rule out any structural issue to her symptoms. Follow up with Neurology for further evaluation of from there end. Encouraged to call the clinic with any questions or concerns in the meantime.      Bernabe Deras Jr. PA-C  [facility]  Department of Neurosurgery     Note dictated with voice recognition software, please excuse any grammatical errors.            [1]   Current Outpatient Medications   Medication Sig Dispense Refill    acetaminophen (TYLENOL) 500 MG tablet Take 1 tablet (500 mg total) by mouth every 6 (six) hours as needed for Pain. 20 tablet 0    albuterol 90 mcg/actuation inhaler Inhale 2 puffs into the lungs every 6 (six) hours as needed for Wheezing. Rescue 18 g 0    aspirin (ECOTRIN) 325 MG EC tablet Take 1 tablet (325 mg total) by mouth 2 (two) times daily. 60 tablet 0    benzonatate (TESSALON) 200 MG capsule       blood sugar diagnostic Strp 1 strip by Misc.(Non-Drug; Combo Route) route once daily. 100 strip 3    clobetasol 0.05% (TEMOVATE) 0.05 % Oint 1 application  2 (two) times daily. Apply to affected area      clonazePAM (KLONOPIN) 1 MG tablet Take 1 mg by mouth daily as needed.      diltiaZEM (CARDIZEM CD) 240 MG 24 hr capsule Take 1 capsule (240 mg total) by mouth once daily. 90 capsule 1    DULoxetine (CYMBALTA) 60 MG capsule Take 60 mg by mouth.      ergocalciferol (ERGOCALCIFEROL) 50,000 unit Cap Take 1 capsule (50,000 Units total) by mouth every 7 days. 12 capsule 3    fluticasone (FLONASE) 50 mcg/actuation  nasal spray 1 spray by Each Nare route once daily. 16 g 1    hydroCHLOROthiazide (HYDRODIURIL) 25 MG tablet Take 1 tablet (25 mg total) by mouth once daily. 90 tablet 1    lancets Misc 1 lancet by Misc.(Non-Drug; Combo Route) route once daily. 100 each 3    metoprolol succinate (TOPROL-XL) 25 MG 24 hr tablet Take 1 tablet (25 mg total) by mouth once daily. 90 tablet 1    metoprolol succinate (TOPROL-XL) 50 MG 24 hr tablet Take 75 mg by mouth.      mometasone (ELOCON) 0.1 % ointment APPLY TO AFFECTED AREA OF SKIN DAILY AS NEEDED FOR PSORIASIS      mometasone 0.1% (ELOCON) 0.1 % cream Apply topically once daily. 15 g 0    pregabalin (LYRICA) 300 MG Cap Take 300 mg by mouth.      traZODone (DESYREL) 50 MG tablet Take 50 mg by mouth every evening.      acetaminophen (TYLENOL) 500 MG tablet Take 2 tablets (1,000 mg total) by mouth every 8 (eight) hours as needed for Pain. 90 tablet 0    amitriptyline (ELAVIL) 10 MG tablet TAKE 1 TABLET BY MOUTH EVERY DAY IN THE EVENING (Patient not taking: Reported on 5/20/2025) 30 tablet 1    buPROPion (WELLBUTRIN XL) 300 MG 24 hr tablet Take 1 tablet (300 mg total) by mouth once daily. 90 tablet 1    ciclopirox 0.77 % Gel Apply topically 2 (two) times daily. 100 g 1    diclofenac (VOLTAREN) 75 MG EC tablet Take 75 mg by mouth 2 (two) times daily as needed. (Patient not taking: Reported on 5/20/2025)      diltiaZEM (CARTIA XT) 180 MG 24 hr capsule Take 1 capsule (180 mg total) by mouth once daily. (Patient not taking: Reported on 5/20/2025) 90 capsule 1    doxycycline (VIBRAMYCIN) 100 MG Cap Take 1 capsule (100 mg total) by mouth 2 (two) times daily. 14 capsule 0    hydrOXYzine (VISTARIL) 100 MG capsule TAKE 1 CAPSULE BY MOUTH IN THE EVENING (Patient not taking: Reported on 5/20/2025) 30 capsule 0    methocarbamol (ROBAXIN) 500 MG Tab Take 2 tablets (1,000 mg total) by mouth 4 (four) times daily as needed (Spasms). (Patient not taking: Reported on 5/20/2025) 30 tablet 0     No current  facility-administered medications for this visit.     Facility-Administered Medications Ordered in Other Visits   Medication Dose Route Frequency Provider Last Rate Last Admin    fentaNYL 50 mcg/mL injection 25 mcg  25 mcg Intravenous Q5 Min PRN Ant Vargas MD   50 mcg at 06/09/21 0646    midazolam (VERSED) 1 mg/mL injection 0.5 mg  0.5 mg Intravenous PRN Ant Vargas MD   2 mg at 06/09/21 0643

## 2025-05-29 ENCOUNTER — TELEPHONE (OUTPATIENT)
Dept: PODIATRY | Facility: CLINIC | Age: 55
End: 2025-05-29
Payer: COMMERCIAL

## 2025-05-29 NOTE — TELEPHONE ENCOUNTER
Pt requesting appt, states ankle is swollen. Pt informed first avail appt isn't until 6/11 and to go to ER or UC to be seen sooner for ankle. Pt states she will go to ER tomorrow if swelling doesn't goes down and to scheduled the 6/11 appt. Pt verbalized understanding. Appt scheduled.

## 2025-05-29 NOTE — TELEPHONE ENCOUNTER
----- Message from Sandra sent at 5/29/2025  3:21 PM CDT -----  Regarding: self  Patient is requesting a sooner appointment.  Patient declined first available appointment listed as well as another facility and provider .  Patient will not accept being placed on the waitlist and is requesting a message be sent to doctor.Name of Caller: selfWhen is the first available appointment? Pt is schedule in juneSymptoms: swollen ankleWould the patient rather a call back or a response via [facility]?Best Call Back Number: [phone] Information:  pt prefers any any/time except Tuesdays or Thursdays

## 2025-06-06 ENCOUNTER — HOSPITAL ENCOUNTER (OUTPATIENT)
Dept: RADIOLOGY | Facility: HOSPITAL | Age: 55
Discharge: HOME | End: 2025-06-06
Attending: PHYSICIAN ASSISTANT
Payer: COMMERCIAL

## 2025-06-06 DIAGNOSIS — R27.0 ATAXIA: ICD-10-CM

## 2025-06-06 PROCEDURE — 72146 MRI CHEST SPINE W/O DYE: CPT | Mod: TC

## 2025-06-06 PROCEDURE — 72146 MRI CHEST SPINE W/O DYE: CPT | Mod: 26,,, | Performed by: RADIOLOGY

## 2025-06-10 ENCOUNTER — OFFICE VISIT (OUTPATIENT)
Dept: NEUROSURGERY | Facility: CLINIC | Age: 55
End: 2025-06-10
Payer: COMMERCIAL

## 2025-06-10 DIAGNOSIS — R27.0 ATAXIA: Primary | ICD-10-CM

## 2025-06-10 DIAGNOSIS — M48.062 SPINAL STENOSIS, LUMBAR REGION WITH NEUROGENIC CLAUDICATION: ICD-10-CM

## 2025-06-10 PROCEDURE — 1159F MED LIST DOCD IN RCRD: CPT | Mod: CPTII,S$GLB,, | Performed by: PHYSICIAN ASSISTANT

## 2025-06-10 PROCEDURE — 99213 OFFICE O/P EST LOW 20 MIN: CPT | Mod: S$GLB,,, | Performed by: PHYSICIAN ASSISTANT

## 2025-06-10 PROCEDURE — 99999 PR PBB SHADOW E&M-EST. PATIENT-LVL II: CPT | Mod: PBBFAC,,, | Performed by: PHYSICIAN ASSISTANT

## 2025-06-10 PROCEDURE — 1160F RVW MEDS BY RX/DR IN RCRD: CPT | Mod: CPTII,S$GLB,, | Performed by: PHYSICIAN ASSISTANT

## 2025-06-10 RX ORDER — DULOXETIN HYDROCHLORIDE 30 MG/1
30 CAPSULE, DELAYED RELEASE ORAL
COMMUNITY

## 2025-06-10 NOTE — PROGRESS NOTES
Neurosurgery  Established Patient    SUBJECTIVE:     History of Present Illness:  55 yo female with PMHx of T2DM, HTN, diastolic HF, FADIA, h/o gastric bypass and chronic back pain with know disc disease with canal stenosis at L4-5. Here today for follow up with updated MRI of the thoracic spine.  Patient denies any new complaints since last visit. Denies pain, paraesthesias, weakness in extremities.  Denies bowel/bladder dysfunction.  Denies clumsiness or loss of dexterity of hands.      Review of patient's allergies indicates:   Allergen Reactions    Amlodipine Nausea And Vomiting     Other reaction(s): Unknown    Amlodipine-benazepril Itching    Azithromycin      Stomach pain, and cramping    Benazepril Nausea And Vomiting     Other reaction(s): Unknown    Hydrocodone      Other reaction(s): Unknown    Hydrocodone-acetaminophen Hives, Itching and Rash       Current Medications[1]    Past Medical History:   Diagnosis Date    Allergy     Anemia     Anxiety     Depression     Diabetes mellitus, type 2     Focal segmental glomerulosclerosis     Hyperlipidemia     Hypertension     Lumbar disc disease     Morbid obesity     FADIA on CPAP     Proteinuria     Retinal artery occlusion      Past Surgical History:   Procedure Laterality Date     SECTION       SECTION      CHOLECYSTECTOMY      COLONOSCOPY N/A 2023    Procedure: COLONOSCOPY;  Surgeon: Juan Morales MD;  Location: [facility] ([address] [place] [address]);  Service: Endoscopy;  Laterality: N/A;  suprep-instr portal-2nd floor-multiple [id number]  Pre call-no answer-PASTORA Hicks Rn endoscopy    CORRECTION OF HAMMER TOE Left 2021    Procedure: LEFT 2-5th metatarsal head resections, hammertoe corrections;  Surgeon: Naima Mistry MD;  Location: [facility];  Service: Orthopedics;  Laterality: Left;    DILATION AND CURETTAGE OF UTERUS      EYE SURGERY      RESECTION OF GASTROCNEMIUS MUSCLE Left 2021    Procedure: LEFT gastroc recession;  Surgeon: Naima DOMINGUEZ  MD Fidelia;  Location: [facility];  Service: Orthopedics;  Laterality: Left;  Thigh tourniquet     Family History       Problem Relation (Age of Onset)    Cancer Mother (56), Father    Colon cancer Mother    Diabetes Mother, Brother    Heart disease Paternal Grandfather    Hyperlipidemia Mother    Hypertension Mother, Father, Brother    Kidney disease Mother    Obesity Mother    Thyroid disease Mother          Social History     Socioeconomic History    Marital status:     Number of children: 1   Occupational History     Employer: [employer]   Tobacco Use    Smoking status: Former     Current packs/day: 0.50     Average packs/day: 0.5 packs/day for 7.0 years (3.5 ttl pk-yrs)     Types: Cigarettes    Smokeless tobacco: Never    Tobacco comments:     Quit 1993.   Substance and Sexual Activity    Alcohol use: Yes     Comment: Rare.     Drug use: No    Sexual activity: Yes     Partners: Male   Social History Narrative    ** Merged History Encounter **         Several family members on mother side with aneurysms heart and aorta     Social Drivers of Health     Financial Resource Strain: Medium Risk (7/5/2024)    Received from [facility]    Overall Financial Resource Strain (CARDIA)     Difficulty of Paying Living Expenses: Somewhat hard   Food Insecurity: No Food Insecurity (7/5/2024)    Received from [facility]    Hunger Vital Sign     Worried About Running Out of Food in the Last Year: Never true     Ran Out of Food in the Last Year: Never true   Transportation Needs: No Transportation Needs (7/5/2024)    Received from [facility]    PRAPARE - Transportation     Lack of Transportation (Medical): No     Lack of Transportation (Non-Medical): No   Physical Activity: Insufficiently Active (7/5/2024)    Received from [facility]    Exercise Vital Sign     Days of Exercise per Week: 2 days     Minutes of Exercise per Session: 30 min   Stress: No Stress Concern Present (7/5/2024)    Received from [facility]    [facility] - Occupational Stress Questionnaire     Feeling of Stress : Only a little   Housing Stability: Low Risk  (7/5/2024)    Received from [facility]    Housing Stability Vital Sign     Unable to Pay for Housing in the Last Year: No     Number of Places Lived in the Last Year: 1     Unstable Housing in the Last Year: No       OBJECTIVE:     Neurosurgery Physical Exam   General: Awake, Alert, Oriented x 3, NAD  Cranial nerves: II- XII are grossly intact  Sensory: response to light touch throughout  Motor Strength: HG 4/5 B/L otherwise full strength upper and lower extremities  DTRs are 3+.  Mild jameson's on left. Negative ankle clonus  Ambulates mild spasticity/unsteadiness    Diagnostic Results: personally reviewed  EXAMINATION:  MRI THORACIC SPINE WITHOUT CONTRAST     CLINICAL HISTORY:  Ataxia, nontraumatic, thoracic pathology suspected;ataxia;  Ataxia, unspecified     TECHNIQUE:  Multiplanar, multisequence images were performed through the thoracic spine.  Contrast was not administered.     COMPARISON:  None     FINDINGS:  Mild levoscoliosis.  No spondylolisthesis.  Vertebral body heights are well maintained.  No acute fractures.  Benign osseous hemangiomas at the T3, T5 and T11 vertebral bodies.  Ill-defined area of signal abnormality at the posterior aspect of the T6 vertebral body, possibly a dilated vascular structure or atypical hemangioma.  No marrow signal abnormality to suggest an infiltrative process.     Multilevel degenerative disc desiccation and mild height loss.  No endplate edema.     Thoracic spinal cord demonstrates normal contour and signal intensity.     Small hiatal hernia.  Right renal cyst.  Dilated extrahepatic bile ducts with tapering at the level of the ampulla, possibly related to post cholecystectomy status.  Paraspinal musculature demonstrates normal bulk and signal intensity.     T1-T2: Central/right subarticular disc protrusion partially  effaces the ventral thecal sac.  No spinal canal stenosis or neural foraminal narrowing.     T2-T3: Right subarticular disc protrusion partially effaces the ventral thecal sac.  No spinal canal stenosis or neural foraminal narrowing.     T5-T6: Right subarticular disc protrusion partially effaces the right lateral recess.  No spinal canal stenosis or neural foraminal narrowing.     T7-T8: Central disc protrusion causes mass effect on the ventral spinal cord.  No spinal canal stenosis or neural foraminal narrowing.     T11-T12: Bilateral facet arthropathy.  No spinal canal stenosis.  Mild right neural foraminal narrowing.     Impression:     1. Thoracic spondylosis as detailed above.  No spinal canal stenosis or high-grade neural foraminal narrowing.        Electronically signed by:Forrest Ramires MD  Date:                                            06/06/2025  Time:                                           13:56    ASSESSMENT/PLAN:     55 yo female with PMHx of T2DM, HTN, diastolic HF, FADIA, h/o gastric bypass and chronic back pain with know disc disease with canal stenosis at L4-5 MRI of the thoracic spine does not show any structural issue or evidence of cord compression to explain patient's symptoms.  B12 level is normal.  No neurosurgical interventions indicated at this time.  Continue to follow up with Neurology for further workup of balance difficulty.  Will plan to see patient on annual basis to follow up with lumbar spine issues unless she develops any new or worsening problems in the meantime. Encouraged to call the clinic with any questions or concerns in the meantime.      Bernabe Deras Jr. JES  [facility]  Department of Neurosurgery     Note dictated with voice recognition software, please excuse any grammatical errors.              [1]   Current Outpatient Medications   Medication Sig Dispense Refill    blood sugar diagnostic Strp 1 strip by Misc.(Non-Drug; Combo Route) route once  daily. 100 strip 3    clobetasol 0.05% (TEMOVATE) 0.05 % Oint 1 application  2 (two) times daily. Apply to affected area      diltiaZEM (CARDIZEM CD) 240 MG 24 hr capsule Take 1 capsule (240 mg total) by mouth once daily. 90 capsule 1    diltiaZEM (CARTIA XT) 180 MG 24 hr capsule Take 1 capsule (180 mg total) by mouth once daily. 90 capsule 1    DULoxetine (CYMBALTA) 30 MG capsule Take 30 mg by mouth.      DULoxetine (CYMBALTA) 60 MG capsule Take 60 mg by mouth.      ergocalciferol (ERGOCALCIFEROL) 50,000 unit Cap Take 1 capsule (50,000 Units total) by mouth every 7 days. 12 capsule 3    hydroCHLOROthiazide (HYDRODIURIL) 25 MG tablet Take 1 tablet (25 mg total) by mouth once daily. 90 tablet 1    lancets Misc 1 lancet by Misc.(Non-Drug; Combo Route) route once daily. 100 each 3    metoprolol succinate (TOPROL-XL) 25 MG 24 hr tablet Take 1 tablet (25 mg total) by mouth once daily. 90 tablet 1    metoprolol succinate (TOPROL-XL) 50 MG 24 hr tablet Take 75 mg by mouth.      mometasone (ELOCON) 0.1 % ointment APPLY TO AFFECTED AREA OF SKIN DAILY AS NEEDED FOR PSORIASIS      pregabalin (LYRICA) 300 MG Cap Take 300 mg by mouth.      traZODone (DESYREL) 50 MG tablet Take 50 mg by mouth every evening.      acetaminophen (TYLENOL) 500 MG tablet Take 2 tablets (1,000 mg total) by mouth every 8 (eight) hours as needed for Pain. (Patient not taking: Reported on 6/10/2025) 90 tablet 0    albuterol 90 mcg/actuation inhaler Inhale 2 puffs into the lungs every 6 (six) hours as needed for Wheezing. Rescue 18 g 0    amitriptyline (ELAVIL) 10 MG tablet TAKE 1 TABLET BY MOUTH EVERY DAY IN THE EVENING (Patient not taking: Reported on 6/10/2025) 30 tablet 1    aspirin (ECOTRIN) 325 MG EC tablet Take 1 tablet (325 mg total) by mouth 2 (two) times daily. 60 tablet 0    benzonatate (TESSALON) 200 MG capsule  (Patient not taking: Reported on 6/10/2025)      buPROPion (WELLBUTRIN XL) 300 MG 24 hr tablet Take 1 tablet (300 mg total) by mouth  once daily. (Patient not taking: Reported on 6/10/2025) 90 tablet 1    ciclopirox 0.77 % Gel Apply topically 2 (two) times daily. 100 g 1    clonazePAM (KLONOPIN) 1 MG tablet Take 1 mg by mouth daily as needed. (Patient not taking: Reported on 6/10/2025)      diclofenac (VOLTAREN) 75 MG EC tablet Take 75 mg by mouth 2 (two) times daily as needed. (Patient not taking: Reported on 6/10/2025)      doxycycline (VIBRAMYCIN) 100 MG Cap Take 1 capsule (100 mg total) by mouth 2 (two) times daily. (Patient not taking: Reported on 6/10/2025) 14 capsule 0    fluticasone (FLONASE) 50 mcg/actuation nasal spray 1 spray by Each Nare route once daily. (Patient not taking: Reported on 6/10/2025) 16 g 1    hydrOXYzine (VISTARIL) 100 MG capsule TAKE 1 CAPSULE BY MOUTH IN THE EVENING (Patient not taking: Reported on 6/10/2025) 30 capsule 0    methocarbamol (ROBAXIN) 500 MG Tab Take 2 tablets (1,000 mg total) by mouth 4 (four) times daily as needed (Spasms). (Patient not taking: Reported on 6/10/2025) 30 tablet 0    mometasone 0.1% (ELOCON) 0.1 % cream Apply topically once daily. (Patient not taking: Reported on 6/10/2025) 15 g 0     No current facility-administered medications for this visit.     Facility-Administered Medications Ordered in Other Visits   Medication Dose Route Frequency Provider Last Rate Last Admin    fentaNYL 50 mcg/mL injection 25 mcg  25 mcg Intravenous Q5 Min PRN Ant Vargas MD   50 mcg at 06/09/21 0646    midazolam (VERSED) 1 mg/mL injection 0.5 mg  0.5 mg Intravenous PRN Ant Vargas MD   2 mg at 06/09/21 0643

## 2025-06-11 ENCOUNTER — OFFICE VISIT (OUTPATIENT)
Dept: PODIATRY | Facility: CLINIC | Age: 55
End: 2025-06-11
Payer: COMMERCIAL

## 2025-06-11 ENCOUNTER — HOSPITAL ENCOUNTER (OUTPATIENT)
Dept: RADIOLOGY | Facility: HOSPITAL | Age: 55
Discharge: HOME | End: 2025-06-11
Attending: PODIATRIST
Payer: COMMERCIAL

## 2025-06-11 VITALS — BODY MASS INDEX: 44.41 KG/M2 | HEIGHT: 68 IN | WEIGHT: 293 LBS

## 2025-06-11 DIAGNOSIS — T14.8XXA ABRASION: ICD-10-CM

## 2025-06-11 DIAGNOSIS — B35.1 ONYCHOMYCOSIS DUE TO DERMATOPHYTE: ICD-10-CM

## 2025-06-11 DIAGNOSIS — B35.1 ONYCHOMYCOSIS DUE TO DERMATOPHYTE: Primary | ICD-10-CM

## 2025-06-11 PROCEDURE — 1159F MED LIST DOCD IN RCRD: CPT | Mod: CPTII,S$GLB,, | Performed by: PODIATRIST

## 2025-06-11 PROCEDURE — 3008F BODY MASS INDEX DOCD: CPT | Mod: CPTII,S$GLB,, | Performed by: PODIATRIST

## 2025-06-11 PROCEDURE — 99214 OFFICE O/P EST MOD 30 MIN: CPT | Mod: S$GLB,,, | Performed by: PODIATRIST

## 2025-06-11 PROCEDURE — 99999 PR PBB SHADOW E&M-EST. PATIENT-LVL III: CPT | Mod: PBBFAC,,, | Performed by: PODIATRIST

## 2025-06-11 PROCEDURE — 73630 X-RAY EXAM OF FOOT: CPT | Mod: TC,FY,PO,LT

## 2025-06-11 PROCEDURE — 73630 X-RAY EXAM OF FOOT: CPT | Mod: 26,LT,, | Performed by: RADIOLOGY

## 2025-06-11 RX ORDER — CICLOPIROX 80 MG/ML
SOLUTION TOPICAL NIGHTLY
Qty: 6.6 ML | Refills: 3 | Status: SHIPPED | OUTPATIENT
Start: 2025-06-11

## 2025-06-12 NOTE — PROGRESS NOTES
Subjective:     Patient ID: Kalee Dupree is a 54 y.o. female.    Chief Complaint: Ankle Pain (Left ankle swelling ) and Diabetes Mellitus (No primary care Dr silvia appt set up )    Kalee is a 54 y.o. female who presents to the clinic upon referral from Dr. Arauz ref. provider found  for evaluation and treatment of diabetic feet. Kalee has a past medical history of Allergy, Anemia, Anxiety, Depression, Diabetes mellitus, type 2, Focal segmental glomerulosclerosis, Hyperlipidemia, Hypertension, Lumbar disc disease, Morbid obesity, FADIA on CPAP, Proteinuria, and Retinal artery occlusion. Reports left ankle pain worse after long periods of standing. - acute issue.     CC#2- Reports toenail discoloration- chronic issue    PCP: Devonte Valdez MD    Date Last Seen by PCP: per above        Hemoglobin A1C   Date Value Ref Range Status   10/26/2024 5.4 4.0 - 5.6 % Final     Comment:     ADA Screening Guidelines:  5.7-6.4%  Consistent with prediabetes  >or=6.5%  Consistent with diabetes    High levels of fetal hemoglobin interfere with the HbA1C  assay. Heterozygous hemoglobin variants (HbS, HgC, etc)do  not significantly interfere with this assay.   However, presence of multiple variants may affect accuracy.     06/04/2022 5.2 4.0 - 5.6 % Final     Comment:     ADA Screening Guidelines:  5.7-6.4%  Consistent with prediabetes  >or=6.5%  Consistent with diabetes    High levels of fetal hemoglobin interfere with the HbA1C  assay. Heterozygous hemoglobin variants (HbS, HgC, etc)do  not significantly interfere with this assay.   However, presence of multiple variants may affect accuracy.     11/03/2020 5.1 4.0 - 5.6 % Final     Comment:     ADA Screening Guidelines:  5.7-6.4%  Consistent with prediabetes  >or=6.5%  Consistent with diabetes  High levels of fetal hemoglobin interfere with the HbA1C  assay. Heterozygous hemoglobin variants (HbS, HgC, etc)do  not significantly interfere with this assay.   However, presence of  multiple variants may affect accuracy.           Review of Systems   Constitutional: Negative for chills.   Cardiovascular:  Negative for chest pain and claudication.   Respiratory:  Negative for cough.    Skin:  Positive for color change, dry skin and nail changes.   Musculoskeletal:  Positive for joint pain.   Gastrointestinal:  Negative for nausea.   Neurological:  Positive for paresthesias. Negative for numbness.   Psychiatric/Behavioral:  The patient is not nervous/anxious.         Objective:     Physical Exam  Constitutional:       Appearance: She is well-developed.      Comments: Oriented to time, place, and person.   Cardiovascular:      Comments: DP and PT pulses are palpable bilaterally. 3 sec capillary refill time and toes and feet are warm to touch proximally .  There is  hair growth on the feet and toes b/l. There is no edema b/l. No spider veins or varicosities present b/l.     Musculoskeletal:      Comments: Equinus noted b/l ankles with < 10 deg DF noted. MMT 5/5 in DF/PF/Inv/Ev resistance with no reproduction of pain in any direction. Passive range of motion of ankle and pedal joints is painless b/l.     Feet:      Right foot:      Skin integrity: No callus or dry skin.      Left foot:      Skin integrity: No callus or dry skin.   Lymphadenopathy:      Comments: Negative lymphadenopathy bilateral popliteal fossa and tarsal tunnel.   Skin:     Comments: No open lesions, lacerations or wounds noted.Interdigital spaces clean, dry and intact b/l. No erythema noted to b/l foot.  Toenails 1-5 bilaterally are elongated by 2-3 mm, thickened by 2-3 mm, discolored/yellowed, dystrophic, brittle with subungual debris.     Neurological:      Mental Status: She is alert.      Comments: Light touch, proprioception, and sharp/dull sensation are all intact bilaterally. Protective threshold with the [place] monofilament is intact bilaterally.    Psychiatric:         Behavior: Behavior is cooperative.            Assessment:      Encounter Diagnosis   Name Primary?    Onychomycosis due to dermatophyte Yes     Plan:     Kalee was seen today for ankle pain and diabetes mellitus.    Diagnoses and all orders for this visit:    Onychomycosis due to dermatophyte  -     X-Ray Foot Complete Left; Future    Other orders  -     ciclopirox (PENLAC) 8 % Soln; Apply topically nightly.      I counseled the patient on her conditions, their implications and medical management.      Xray left foot ordered    Discussed conservative treatment with shoes of adequate dimensions, material, and style to alleviate symptoms and delay or prevent surgical intervention.    Instructed on daily application of betadine left plantar hallux abrasion.     CC#2- Onychomycosis    At patient's request, I discussed different treatments for toenail fungus. We discussed oral antifungals but I did not recommend them as a first line treatment since the medication is taken internally and can have side effects such as rash, taste disturbances, and liver enzyme elevation. We discussed topical Penlac to be applied daily and removed weekly. Pt. Expresses understanding and would like to try the Penlac. Rx sent to the pharmacy.     RTC PRN

## (undated) DEVICE — SUT ETHILON 3-0 PS2 18 BLK

## (undated) DEVICE — SEE MEDLINE ITEM 152572

## (undated) DEVICE — WIRE KIRSCHNER 1.1 MM
Type: IMPLANTABLE DEVICE | Site: FOOT | Status: NON-FUNCTIONAL
Removed: 2021-06-09

## (undated) DEVICE — SEE MEDLINE ITEM 152529

## (undated) DEVICE — SEE MEDLINE ITEM 146347

## (undated) DEVICE — TUBING NEPTUNE 2 SMOKE 10IN

## (undated) DEVICE — SUT CTD VICRYL 2.0

## (undated) DEVICE — BANDAGE DERMACEA STRETCH 4X1IN

## (undated) DEVICE — KWIRE MICA 1.4X150MM
Type: IMPLANTABLE DEVICE | Site: FOOT | Status: NON-FUNCTIONAL
Removed: 2021-06-09

## (undated) DEVICE — Device

## (undated) DEVICE — APPLICATOR CHLORAPREP ORN 26ML

## (undated) DEVICE — DRESSING XEROFORM FOIL PK 1X8

## (undated) DEVICE — MARKER SKIN STND TIP BLUE BARR

## (undated) DEVICE — BLADE SCALP OPHTL RND TIP

## (undated) DEVICE — SHOE POST-OP WOMEN/LG

## (undated) DEVICE — BIT DRILL ORTHOLOC 2X30MM

## (undated) DEVICE — SUT ETHILON 3/0 18IN PS-1

## (undated) DEVICE — SUT MONOCYRL 4-0 PS2 UND

## (undated) DEVICE — DRAPE STERI-DRAPE 1000 17X11IN

## (undated) DEVICE — GLOVE BIOGEL ORTHOPEDIC 7.5

## (undated) DEVICE — PAD CAST 2 IN X 4YDS STERILE

## (undated) DEVICE — ELECTRODE REM PLYHSV RETURN 9

## (undated) DEVICE — SEE MEDLINE ITEM 146298

## (undated) DEVICE — GAUZE SPONGE 4X4 12PLY

## (undated) DEVICE — SUT 0 VICRYL / CT-1

## (undated) DEVICE — DRESSING N ADH OIL EMUL 3X3

## (undated) DEVICE — SEE MEDLINE ITEM 152522

## (undated) DEVICE — STOCKINET 4INX48

## (undated) DEVICE — BLADE SAGITTA 5/BX

## (undated) DEVICE — LOOP VESSEL BLUE MAXI

## (undated) DEVICE — TOURNIQUET SB QC SP 18X4IN

## (undated) DEVICE — SPONGE GELFOAM 12-7MM

## (undated) DEVICE — TOURNIQUET SB QC DP 44X4IN

## (undated) DEVICE — SPONGE GAUZE 16PLY 4X4

## (undated) DEVICE — SUT 3-0 CHROMIC GUT / FS-2

## (undated) DEVICE — SUT VICRYL PLUS 2-0 CT1 18

## (undated) DEVICE — BLADE SURG #15 CARBON STEEL

## (undated) DEVICE — STOCKINET TUBULAR 1 PLY 6X60IN

## (undated) DEVICE — DRESSING N ADH OIL EMUL 3X8

## (undated) DEVICE — SEE MEDLINE ITEM 152515

## (undated) DEVICE — NDL 22GA X1 1/2 REG BEVEL

## (undated) DEVICE — BANDAGE ACE ELASTIC 6"

## (undated) DEVICE — SUT MONOCRYL 3-0 PS-2 UND

## (undated) DEVICE — SUT VICRYL 3-0 27 SH

## (undated) DEVICE — BANDAGE ESMARK 6X12

## (undated) DEVICE — PAD CAST SPECIALIST STRL 4

## (undated) DEVICE — BLADE SURG STAINLESS STEEL #11

## (undated) DEVICE — SEE MEDLINE ITEM 146308

## (undated) DEVICE — PAD CAST SPECIALIST STRL 6

## (undated) DEVICE — SOL 9P NACL IRR PIC IL

## (undated) DEVICE — SHOE CAST POST-OP MEN SBUNION

## (undated) DEVICE — BLADE MICRO SAGGITAL SAW 5/BX

## (undated) DEVICE — SEE MEDLINE ITEM 146231

## (undated) DEVICE — PAD ABD 8X10 STERILE

## (undated) DEVICE — IMPLANTABLE DEVICE
Type: IMPLANTABLE DEVICE | Site: FOOT | Status: NON-FUNCTIONAL
Removed: 2021-06-09

## (undated) DEVICE — SPLINT COMFORMABLE 5X30

## (undated) DEVICE — SUT VICRYL 2-0 CT-2 VCP269H

## (undated) DEVICE — SEE MEDLINE ITEM 157150

## (undated) DEVICE — SUT ETHILON 4-0 BLK MONO

## (undated) DEVICE — BLADE SAGITTAL FINE 5.5 X 18.5

## (undated) DEVICE — BLADE SURG STAINLESS STEEL #15

## (undated) DEVICE — TRAY MINOR ORTHO